# Patient Record
Sex: MALE | Race: WHITE | NOT HISPANIC OR LATINO | Employment: OTHER | URBAN - METROPOLITAN AREA
[De-identification: names, ages, dates, MRNs, and addresses within clinical notes are randomized per-mention and may not be internally consistent; named-entity substitution may affect disease eponyms.]

---

## 2017-11-15 ENCOUNTER — HOSPITAL ENCOUNTER (INPATIENT)
Facility: MEDICAL CENTER | Age: 77
LOS: 7 days | DRG: 166 | End: 2017-11-22
Attending: EMERGENCY MEDICINE | Admitting: HOSPITALIST
Payer: MEDICARE

## 2017-11-15 ENCOUNTER — APPOINTMENT (OUTPATIENT)
Dept: RADIOLOGY | Facility: MEDICAL CENTER | Age: 77
DRG: 166 | End: 2017-11-15
Attending: HOSPITALIST
Payer: MEDICARE

## 2017-11-15 ENCOUNTER — APPOINTMENT (OUTPATIENT)
Dept: RADIOLOGY | Facility: MEDICAL CENTER | Age: 77
DRG: 166 | End: 2017-11-15
Attending: EMERGENCY MEDICINE
Payer: MEDICARE

## 2017-11-15 ENCOUNTER — APPOINTMENT (OUTPATIENT)
Dept: RADIOLOGY | Facility: MEDICAL CENTER | Age: 77
DRG: 166 | End: 2017-11-15
Attending: INTERNAL MEDICINE
Payer: MEDICARE

## 2017-11-15 ENCOUNTER — RESOLUTE PROFESSIONAL BILLING HOSPITAL PROF FEE (OUTPATIENT)
Dept: HOSPITALIST | Facility: MEDICAL CENTER | Age: 77
End: 2017-11-15
Payer: COMMERCIAL

## 2017-11-15 DIAGNOSIS — J96.22 ACUTE ON CHRONIC RESPIRATORY FAILURE WITH HYPOXIA AND HYPERCAPNIA (HCC): ICD-10-CM

## 2017-11-15 DIAGNOSIS — L03.90 CHRONIC CELLULITIS: ICD-10-CM

## 2017-11-15 DIAGNOSIS — J96.21 ACUTE ON CHRONIC RESPIRATORY FAILURE WITH HYPOXIA AND HYPERCAPNIA (HCC): ICD-10-CM

## 2017-11-15 DIAGNOSIS — J96.22 ACUTE ON CHRONIC RESPIRATORY FAILURE WITH HYPERCAPNIA (HCC): ICD-10-CM

## 2017-11-15 DIAGNOSIS — I95.9 HYPOTENSION, UNSPECIFIED HYPOTENSION TYPE: ICD-10-CM

## 2017-11-15 DIAGNOSIS — J44.1 COPD EXACERBATION (HCC): ICD-10-CM

## 2017-11-15 LAB
ALBUMIN SERPL BCP-MCNC: 3.7 G/DL (ref 3.2–4.9)
ALBUMIN/GLOB SERPL: 1.1 G/DL
ALP SERPL-CCNC: 84 U/L (ref 30–99)
ALT SERPL-CCNC: 8 U/L (ref 2–50)
ANION GAP SERPL CALC-SCNC: 3 MMOL/L (ref 0–11.9)
APPEARANCE UR: CLEAR
AST SERPL-CCNC: 13 U/L (ref 12–45)
BACTERIA #/AREA URNS HPF: NEGATIVE /HPF
BASE EXCESS BLDA CALC-SCNC: -1 MMOL/L (ref -4–3)
BASE EXCESS BLDA CALC-SCNC: -3 MMOL/L (ref -4–3)
BASOPHILS # BLD AUTO: 0.3 % (ref 0–1.8)
BASOPHILS # BLD: 0.03 K/UL (ref 0–0.12)
BILIRUB SERPL-MCNC: 0.5 MG/DL (ref 0.1–1.5)
BILIRUB UR QL STRIP.AUTO: NEGATIVE
BODY TEMPERATURE: 35 CENTIGRADE
BODY TEMPERATURE: ABNORMAL DEGREES
BUN SERPL-MCNC: 16 MG/DL (ref 8–22)
CALCIUM SERPL-MCNC: 9.3 MG/DL (ref 8.5–10.5)
CHLORIDE SERPL-SCNC: 92 MMOL/L (ref 96–112)
CO2 BLDA-SCNC: 31 MMOL/L (ref 20–33)
CO2 SERPL-SCNC: 31 MMOL/L (ref 20–33)
COLOR UR: YELLOW
CREAT SERPL-MCNC: 1.82 MG/DL (ref 0.5–1.4)
CREAT UR-MCNC: 244.3 MG/DL
EOSINOPHIL # BLD AUTO: 0.26 K/UL (ref 0–0.51)
EOSINOPHIL NFR BLD: 2.9 % (ref 0–6.9)
EPI CELLS #/AREA URNS HPF: NEGATIVE /HPF
ERYTHROCYTE [DISTWIDTH] IN BLOOD BY AUTOMATED COUNT: 49.2 FL (ref 35.9–50)
GFR SERPL CREATININE-BSD FRML MDRD: 36 ML/MIN/1.73 M 2
GLOBULIN SER CALC-MCNC: 3.5 G/DL (ref 1.9–3.5)
GLUCOSE SERPL-MCNC: 128 MG/DL (ref 65–99)
GLUCOSE UR STRIP.AUTO-MCNC: NEGATIVE MG/DL
HCO3 BLDA-SCNC: 28 MMOL/L (ref 17–25)
HCO3 BLDA-SCNC: 28.7 MMOL/L (ref 17–25)
HCT VFR BLD AUTO: 39.2 % (ref 42–52)
HGB BLD-MCNC: 12.1 G/DL (ref 14–18)
HYALINE CASTS #/AREA URNS LPF: ABNORMAL /LPF
IMM GRANULOCYTES # BLD AUTO: 0.05 K/UL (ref 0–0.11)
IMM GRANULOCYTES NFR BLD AUTO: 0.6 % (ref 0–0.9)
INHALED O2 FLOW RATE: 4.5 L/MIN (ref 2–10)
KETONES UR STRIP.AUTO-MCNC: NEGATIVE MG/DL
LACTATE BLD-SCNC: 1 MMOL/L (ref 0.5–2)
LACTATE BLD-SCNC: 1.3 MMOL/L (ref 0.5–2)
LEUKOCYTE ESTERASE UR QL STRIP.AUTO: NEGATIVE
LYMPHOCYTES # BLD AUTO: 1.15 K/UL (ref 1–4.8)
LYMPHOCYTES NFR BLD: 12.8 % (ref 22–41)
MCH RBC QN AUTO: 28.8 PG (ref 27–33)
MCHC RBC AUTO-ENTMCNC: 30.9 G/DL (ref 33.7–35.3)
MCV RBC AUTO: 93.3 FL (ref 81.4–97.8)
MICRO URNS: ABNORMAL
MONOCYTES # BLD AUTO: 0.66 K/UL (ref 0–0.85)
MONOCYTES NFR BLD AUTO: 7.3 % (ref 0–13.4)
NEUTROPHILS # BLD AUTO: 6.85 K/UL (ref 1.82–7.42)
NEUTROPHILS NFR BLD: 76.1 % (ref 44–72)
NITRITE UR QL STRIP.AUTO: NEGATIVE
NRBC # BLD AUTO: 0 K/UL
NRBC BLD AUTO-RTO: 0 /100 WBC
O2/TOTAL GAS SETTING VFR VENT: 60 %
PCO2 BLDA: 78.8 MMHG (ref 26–37)
PCO2 BLDA: 79.7 MMHG (ref 26–37)
PCO2 TEMP ADJ BLDA: 73 MMHG (ref 26–37)
PCO2 TEMP ADJ BLDA: 80.9 MMHG (ref 26–37)
PH BLDA: 7.16 [PH] (ref 7.4–7.5)
PH BLDA: 7.17 [PH] (ref 7.4–7.5)
PH TEMP ADJ BLDA: 7.16 [PH] (ref 7.4–7.5)
PH TEMP ADJ BLDA: 7.19 [PH] (ref 7.4–7.5)
PH UR STRIP.AUTO: 5 [PH]
PLATELET # BLD AUTO: 235 K/UL (ref 164–446)
PMV BLD AUTO: 9.2 FL (ref 9–12.9)
PO2 BLDA: 145 MMHG (ref 64–87)
PO2 BLDA: 85.3 MMHG (ref 64–87)
PO2 TEMP ADJ BLDA: 148 MMHG (ref 64–87)
PO2 TEMP ADJ BLDA: 75 MMHG (ref 64–87)
POTASSIUM SERPL-SCNC: 5.2 MMOL/L (ref 3.6–5.5)
PROCALCITONIN SERPL-MCNC: 0.06 NG/ML
PROT SERPL-MCNC: 7.2 G/DL (ref 6–8.2)
PROT UR QL STRIP: NEGATIVE MG/DL
RBC # BLD AUTO: 4.2 M/UL (ref 4.7–6.1)
RBC # URNS HPF: ABNORMAL /HPF
RBC UR QL AUTO: ABNORMAL
SAO2 % BLDA: 94.1 % (ref 93–99)
SAO2 % BLDA: 98 % (ref 93–99)
SODIUM SERPL-SCNC: 126 MMOL/L (ref 135–145)
SODIUM SERPL-SCNC: 130 MMOL/L (ref 135–145)
SODIUM UR-SCNC: 23 MMOL/L
SP GR UR STRIP.AUTO: 1.02
SPECIMEN DRAWN FROM PATIENT: ABNORMAL
TRIGL SERPL-MCNC: 151 MG/DL (ref 0–149)
UROBILINOGEN UR STRIP.AUTO-MCNC: 0.2 MG/DL
WBC # BLD AUTO: 9 K/UL (ref 4.8–10.8)
WBC #/AREA URNS HPF: ABNORMAL /HPF

## 2017-11-15 PROCEDURE — 96375 TX/PRO/DX INJ NEW DRUG ADDON: CPT

## 2017-11-15 PROCEDURE — 96367 TX/PROPH/DG ADDL SEQ IV INF: CPT

## 2017-11-15 PROCEDURE — 770022 HCHG ROOM/CARE - ICU (200)

## 2017-11-15 PROCEDURE — 36556 INSERT NON-TUNNEL CV CATH: CPT

## 2017-11-15 PROCEDURE — 304561 HCHG STAT O2

## 2017-11-15 PROCEDURE — 0BH17EZ INSERTION OF ENDOTRACHEAL AIRWAY INTO TRACHEA, VIA NATURAL OR ARTIFICIAL OPENING: ICD-10-PCS | Performed by: EMERGENCY MEDICINE

## 2017-11-15 PROCEDURE — 93005 ELECTROCARDIOGRAM TRACING: CPT | Performed by: HOSPITALIST

## 2017-11-15 PROCEDURE — 82570 ASSAY OF URINE CREATININE: CPT

## 2017-11-15 PROCEDURE — 84300 ASSAY OF URINE SODIUM: CPT

## 2017-11-15 PROCEDURE — 96365 THER/PROPH/DIAG IV INF INIT: CPT

## 2017-11-15 PROCEDURE — 94002 VENT MGMT INPAT INIT DAY: CPT

## 2017-11-15 PROCEDURE — 700105 HCHG RX REV CODE 258: Performed by: EMERGENCY MEDICINE

## 2017-11-15 PROCEDURE — 304538 HCHG NG TUBE

## 2017-11-15 PROCEDURE — B543ZZA ULTRASONOGRAPHY OF RIGHT JUGULAR VEINS, GUIDANCE: ICD-10-PCS | Performed by: INTERNAL MEDICINE

## 2017-11-15 PROCEDURE — 303105 HCHG CATHETER EXTRA

## 2017-11-15 PROCEDURE — 99291 CRITICAL CARE FIRST HOUR: CPT | Performed by: HOSPITALIST

## 2017-11-15 PROCEDURE — 87070 CULTURE OTHR SPECIMN AEROBIC: CPT

## 2017-11-15 PROCEDURE — 87205 SMEAR GRAM STAIN: CPT

## 2017-11-15 PROCEDURE — 96366 THER/PROPH/DIAG IV INF ADDON: CPT

## 2017-11-15 PROCEDURE — 31500 INSERT EMERGENCY AIRWAY: CPT

## 2017-11-15 PROCEDURE — 302214 INTUBATION BOX: Performed by: EMERGENCY MEDICINE

## 2017-11-15 PROCEDURE — 84295 ASSAY OF SERUM SODIUM: CPT

## 2017-11-15 PROCEDURE — 700101 HCHG RX REV CODE 250

## 2017-11-15 PROCEDURE — 96368 THER/DIAG CONCURRENT INF: CPT

## 2017-11-15 PROCEDURE — 83605 ASSAY OF LACTIC ACID: CPT | Mod: 91

## 2017-11-15 PROCEDURE — 51702 INSERT TEMP BLADDER CATH: CPT

## 2017-11-15 PROCEDURE — 85025 COMPLETE CBC W/AUTO DIFF WBC: CPT

## 2017-11-15 PROCEDURE — 84145 PROCALCITONIN (PCT): CPT

## 2017-11-15 PROCEDURE — 36415 COLL VENOUS BLD VENIPUNCTURE: CPT

## 2017-11-15 PROCEDURE — 71010 DX-CHEST-PORTABLE (1 VIEW): CPT

## 2017-11-15 PROCEDURE — 5A1945Z RESPIRATORY VENTILATION, 24-96 CONSECUTIVE HOURS: ICD-10-PCS | Performed by: EMERGENCY MEDICINE

## 2017-11-15 PROCEDURE — 02HV33Z INSERTION OF INFUSION DEVICE INTO SUPERIOR VENA CAVA, PERCUTANEOUS APPROACH: ICD-10-PCS | Performed by: INTERNAL MEDICINE

## 2017-11-15 PROCEDURE — 80053 COMPREHEN METABOLIC PANEL: CPT

## 2017-11-15 PROCEDURE — 700111 HCHG RX REV CODE 636 W/ 250 OVERRIDE (IP): Performed by: EMERGENCY MEDICINE

## 2017-11-15 PROCEDURE — 94640 AIRWAY INHALATION TREATMENT: CPT

## 2017-11-15 PROCEDURE — 87040 BLOOD CULTURE FOR BACTERIA: CPT | Mod: 91

## 2017-11-15 PROCEDURE — 700101 HCHG RX REV CODE 250: Performed by: EMERGENCY MEDICINE

## 2017-11-15 PROCEDURE — C1751 CATH, INF, PER/CENT/MIDLINE: HCPCS

## 2017-11-15 PROCEDURE — 81001 URINALYSIS AUTO W/SCOPE: CPT

## 2017-11-15 PROCEDURE — 700105 HCHG RX REV CODE 258

## 2017-11-15 PROCEDURE — 84478 ASSAY OF TRIGLYCERIDES: CPT

## 2017-11-15 PROCEDURE — 87086 URINE CULTURE/COLONY COUNT: CPT

## 2017-11-15 PROCEDURE — 99291 CRITICAL CARE FIRST HOUR: CPT

## 2017-11-15 PROCEDURE — 82803 BLOOD GASES ANY COMBINATION: CPT | Mod: 91

## 2017-11-15 RX ORDER — SODIUM CHLORIDE 9 MG/ML
INJECTION, SOLUTION INTRAVENOUS
Status: COMPLETED
Start: 2017-11-15 | End: 2017-11-15

## 2017-11-15 RX ORDER — GABAPENTIN 300 MG/1
300 CAPSULE ORAL
COMMUNITY
End: 2017-12-30

## 2017-11-15 RX ORDER — ATORVASTATIN CALCIUM 20 MG/1
20 TABLET, FILM COATED ORAL DAILY
Status: DISCONTINUED | OUTPATIENT
Start: 2017-11-16 | End: 2017-11-22 | Stop reason: HOSPADM

## 2017-11-15 RX ORDER — GABAPENTIN 100 MG/1
100 CAPSULE ORAL NIGHTLY PRN
COMMUNITY
End: 2017-11-15

## 2017-11-15 RX ORDER — ONDANSETRON 4 MG/1
4 TABLET, ORALLY DISINTEGRATING ORAL EVERY 4 HOURS PRN
Status: DISCONTINUED | OUTPATIENT
Start: 2017-11-15 | End: 2017-11-22 | Stop reason: HOSPADM

## 2017-11-15 RX ORDER — CARVEDILOL 25 MG/1
25 TABLET ORAL
COMMUNITY
End: 2017-11-15

## 2017-11-15 RX ORDER — NOREPINEPHRINE BITARTRATE 1 MG/ML
INJECTION, SOLUTION INTRAVENOUS
Status: DISCONTINUED
Start: 2017-11-15 | End: 2017-11-16

## 2017-11-15 RX ORDER — MORPHINE SULFATE 15 MG/1
15 TABLET ORAL EVERY 8 HOURS PRN
Status: ON HOLD | COMMUNITY
End: 2017-12-30

## 2017-11-15 RX ORDER — IPRATROPIUM BROMIDE AND ALBUTEROL SULFATE 2.5; .5 MG/3ML; MG/3ML
SOLUTION RESPIRATORY (INHALATION)
Status: COMPLETED
Start: 2017-11-15 | End: 2017-11-15

## 2017-11-15 RX ORDER — DEXTROSE MONOHYDRATE 25 G/50ML
25 INJECTION, SOLUTION INTRAVENOUS
Status: DISCONTINUED | OUTPATIENT
Start: 2017-11-15 | End: 2017-11-17

## 2017-11-15 RX ORDER — GABAPENTIN 300 MG/1
300 CAPSULE ORAL
Status: DISCONTINUED | OUTPATIENT
Start: 2017-11-15 | End: 2017-11-22 | Stop reason: HOSPADM

## 2017-11-15 RX ORDER — BISACODYL 10 MG
10 SUPPOSITORY, RECTAL RECTAL
Status: DISCONTINUED | OUTPATIENT
Start: 2017-11-15 | End: 2017-11-15

## 2017-11-15 RX ORDER — ATORVASTATIN CALCIUM 20 MG/1
20 TABLET, FILM COATED ORAL DAILY
COMMUNITY
End: 2017-12-30

## 2017-11-15 RX ORDER — CARVEDILOL 12.5 MG/1
12.5 TABLET ORAL 2 TIMES DAILY WITH MEALS
Status: DISCONTINUED | OUTPATIENT
Start: 2017-11-16 | End: 2017-11-15

## 2017-11-15 RX ORDER — FUROSEMIDE 20 MG/1
20 TABLET ORAL DAILY
COMMUNITY
End: 2017-12-30

## 2017-11-15 RX ORDER — OXYCODONE HYDROCHLORIDE 5 MG/1
5 TABLET ORAL
Status: DISCONTINUED | OUTPATIENT
Start: 2017-11-15 | End: 2017-11-22 | Stop reason: HOSPADM

## 2017-11-15 RX ORDER — POLYETHYLENE GLYCOL 3350 17 G/17G
1 POWDER, FOR SOLUTION ORAL
Status: DISCONTINUED | OUTPATIENT
Start: 2017-11-15 | End: 2017-11-15

## 2017-11-15 RX ORDER — IPRATROPIUM BROMIDE AND ALBUTEROL SULFATE 2.5; .5 MG/3ML; MG/3ML
3 SOLUTION RESPIRATORY (INHALATION)
Status: DISCONTINUED | OUTPATIENT
Start: 2017-11-15 | End: 2017-11-20

## 2017-11-15 RX ORDER — CHLORHEXIDINE GLUCONATE ORAL RINSE 1.2 MG/ML
15 SOLUTION DENTAL 2 TIMES DAILY
Status: DISCONTINUED | OUTPATIENT
Start: 2017-11-15 | End: 2017-11-19

## 2017-11-15 RX ORDER — CEFTRIAXONE 2 G/1
2 INJECTION, POWDER, FOR SOLUTION INTRAMUSCULAR; INTRAVENOUS ONCE
Status: COMPLETED | OUTPATIENT
Start: 2017-11-15 | End: 2017-11-15

## 2017-11-15 RX ORDER — SODIUM CHLORIDE, SODIUM LACTATE, POTASSIUM CHLORIDE, CALCIUM CHLORIDE 600; 310; 30; 20 MG/100ML; MG/100ML; MG/100ML; MG/100ML
500 INJECTION, SOLUTION INTRAVENOUS ONCE
Status: COMPLETED | OUTPATIENT
Start: 2017-11-15 | End: 2017-11-15

## 2017-11-15 RX ORDER — METHYLPREDNISOLONE SODIUM SUCCINATE 40 MG/ML
40 INJECTION, POWDER, LYOPHILIZED, FOR SOLUTION INTRAMUSCULAR; INTRAVENOUS EVERY 6 HOURS
Status: DISCONTINUED | OUTPATIENT
Start: 2017-11-15 | End: 2017-11-15

## 2017-11-15 RX ORDER — BISACODYL 10 MG
10 SUPPOSITORY, RECTAL RECTAL
Status: DISCONTINUED | OUTPATIENT
Start: 2017-11-15 | End: 2017-11-22 | Stop reason: HOSPADM

## 2017-11-15 RX ORDER — ONDANSETRON 2 MG/ML
4 INJECTION INTRAMUSCULAR; INTRAVENOUS EVERY 4 HOURS PRN
Status: DISCONTINUED | OUTPATIENT
Start: 2017-11-15 | End: 2017-11-22 | Stop reason: HOSPADM

## 2017-11-15 RX ORDER — SODIUM CHLORIDE 9 MG/ML
30 INJECTION, SOLUTION INTRAVENOUS
Status: DISCONTINUED | OUTPATIENT
Start: 2017-11-15 | End: 2017-11-22 | Stop reason: HOSPADM

## 2017-11-15 RX ORDER — ACETAMINOPHEN 325 MG/1
650 TABLET ORAL EVERY 6 HOURS PRN
Status: DISCONTINUED | OUTPATIENT
Start: 2017-11-15 | End: 2017-11-22 | Stop reason: HOSPADM

## 2017-11-15 RX ORDER — AMOXICILLIN 250 MG
2 CAPSULE ORAL 2 TIMES DAILY
Status: DISCONTINUED | OUTPATIENT
Start: 2017-11-16 | End: 2017-11-22 | Stop reason: HOSPADM

## 2017-11-15 RX ORDER — SUCCINYLCHOLINE CHLORIDE 20 MG/ML
120 INJECTION INTRAMUSCULAR; INTRAVENOUS ONCE
Status: COMPLETED | OUTPATIENT
Start: 2017-11-15 | End: 2017-11-15

## 2017-11-15 RX ORDER — SODIUM CHLORIDE 9 MG/ML
500 INJECTION, SOLUTION INTRAVENOUS
Status: DISCONTINUED | OUTPATIENT
Start: 2017-11-15 | End: 2017-11-22 | Stop reason: HOSPADM

## 2017-11-15 RX ORDER — CARVEDILOL 12.5 MG/1
12.5 TABLET ORAL 2 TIMES DAILY WITH MEALS
Status: ON HOLD | COMMUNITY
End: 2017-11-22

## 2017-11-15 RX ORDER — MORPHINE SULFATE 15 MG/1
15 TABLET, FILM COATED, EXTENDED RELEASE ORAL 3 TIMES DAILY
COMMUNITY
End: 2017-11-15

## 2017-11-15 RX ORDER — METHYLPREDNISOLONE SODIUM SUCCINATE 125 MG/2ML
62.5 INJECTION, POWDER, LYOPHILIZED, FOR SOLUTION INTRAMUSCULAR; INTRAVENOUS EVERY 6 HOURS
Status: DISCONTINUED | OUTPATIENT
Start: 2017-11-16 | End: 2017-11-18

## 2017-11-15 RX ORDER — LISINOPRIL 20 MG/1
20 TABLET ORAL DAILY
Status: DISCONTINUED | OUTPATIENT
Start: 2017-11-16 | End: 2017-11-15

## 2017-11-15 RX ORDER — FAMOTIDINE 20 MG/1
20 TABLET, FILM COATED ORAL DAILY
Status: DISCONTINUED | OUTPATIENT
Start: 2017-11-16 | End: 2017-11-20

## 2017-11-15 RX ORDER — CLINDAMYCIN HYDROCHLORIDE 150 MG/1
150 CAPSULE ORAL 3 TIMES DAILY
Status: ON HOLD | COMMUNITY
Start: 2017-10-17 | End: 2017-11-22

## 2017-11-15 RX ORDER — AMOXICILLIN 250 MG
2 CAPSULE ORAL 2 TIMES DAILY
Status: DISCONTINUED | OUTPATIENT
Start: 2017-11-15 | End: 2017-11-15

## 2017-11-15 RX ORDER — LISINOPRIL 20 MG/1
20 TABLET ORAL
COMMUNITY
End: 2017-12-30

## 2017-11-15 RX ORDER — SODIUM CHLORIDE 9 MG/ML
INJECTION, SOLUTION INTRAVENOUS CONTINUOUS
Status: DISCONTINUED | OUTPATIENT
Start: 2017-11-15 | End: 2017-11-18

## 2017-11-15 RX ORDER — POLYETHYLENE GLYCOL 3350 17 G/17G
1 POWDER, FOR SOLUTION ORAL
Status: DISCONTINUED | OUTPATIENT
Start: 2017-11-15 | End: 2017-11-22 | Stop reason: HOSPADM

## 2017-11-15 RX ORDER — OXYCODONE HYDROCHLORIDE 5 MG/1
2.5 TABLET ORAL
Status: DISCONTINUED | OUTPATIENT
Start: 2017-11-15 | End: 2017-11-22 | Stop reason: HOSPADM

## 2017-11-15 RX ORDER — LIDOCAINE HYDROCHLORIDE 10 MG/ML
1-2 INJECTION, SOLUTION INFILTRATION; PERINEURAL
Status: DISCONTINUED | OUTPATIENT
Start: 2017-11-15 | End: 2017-11-20

## 2017-11-15 RX ADMIN — DOXYCYCLINE 100 MG: 100 INJECTION, POWDER, LYOPHILIZED, FOR SOLUTION INTRAVENOUS at 18:10

## 2017-11-15 RX ADMIN — CEFTRIAXONE SODIUM 2 G: 2 INJECTION, POWDER, FOR SOLUTION INTRAMUSCULAR; INTRAVENOUS at 17:45

## 2017-11-15 RX ADMIN — SODIUM CHLORIDE 100 ML: 9 INJECTION, SOLUTION INTRAVENOUS at 17:45

## 2017-11-15 RX ADMIN — PROPOFOL 80 MG: 10 INJECTION, EMULSION INTRAVENOUS at 19:17

## 2017-11-15 RX ADMIN — SODIUM CHLORIDE, POTASSIUM CHLORIDE, SODIUM LACTATE AND CALCIUM CHLORIDE 500 ML: 600; 310; 30; 20 INJECTION, SOLUTION INTRAVENOUS at 17:29

## 2017-11-15 RX ADMIN — IPRATROPIUM BROMIDE AND ALBUTEROL SULFATE 3 ML: .5; 3 SOLUTION RESPIRATORY (INHALATION) at 21:41

## 2017-11-15 RX ADMIN — PROPOFOL 40 MCG/KG/MIN: 10 INJECTION, EMULSION INTRAVENOUS at 19:24

## 2017-11-15 RX ADMIN — NOREPINEPHRINE BITARTRATE 5 MCG/MIN: 1 INJECTION INTRAVENOUS at 20:30

## 2017-11-15 RX ADMIN — SUCCINYLCHOLINE CHLORIDE 120 MG: 20 INJECTION, SOLUTION INTRAMUSCULAR; INTRAVENOUS at 19:25

## 2017-11-15 ASSESSMENT — CHA2DS2 SCORE
CHF OR LEFT VENTRICULAR DYSFUNCTION: YES
AGE 65 TO 74: NO
DIABETES: YES
HYPERTENSION: NO
PRIOR STROKE OR TIA OR THROMBOEMBOLISM: NO
SEX: MALE
CHA2DS2 VASC SCORE: 4
AGE 75 OR GREATER: YES
VASCULAR DISEASE: NO

## 2017-11-15 ASSESSMENT — LIFESTYLE VARIABLES: DO YOU DRINK ALCOHOL: NO

## 2017-11-15 ASSESSMENT — PAIN SCALES - GENERAL
PAINLEVEL_OUTOF10: 0

## 2017-11-15 NOTE — ED NOTES
"BIB [facility] to [facility].  [facility].  Pt reports feeling fine after treatments and awoke feeling fine this a.m.  Pts daughter states she came home at 1230 to find pt altered \"and twitching\" sitting up in his chair.  Medics arrived to find pts O2 sat 80% on his 4L's of O2, placed on a mask pta. Pt arrives A&Ox3, arrives c/o feeling \"tired\".  Pt in gown, on monitor, chart up for ERP.  Sepsis score of 4, protocol initiated. Blood drawn & sent.   "

## 2017-11-15 NOTE — ED PROVIDER NOTES
ED Provider Note    HPI: Patient is a 77-year-old male who presented to the emergency department by ambulance transfer November 15, 2017 at 3:31 PM with a chief complaint of altered level of consciousness.    Patient is being followed at the [facility] for chronic lower extremity cellulitis. Patient had treatment yesterday and woke up today feeling okay. The patient's daughter states she came home at 12:30 PM to find the patient altered with twitching movements in and up in his chair. Paramedics arrived and found the patient had no desaturation of 80% on 4 L. With a facemask on the patient on arrival the patient is complaining of feeling tired but otherwise seems improved. His daughter accompanies him and also confirms that he is confused. The patient adamantly denied any chest pain or abdominal pain. He's had no nausea or vomiting. No fever no chills no cough. No other somatic complaints    Review of Systems: Positive for altered level of consciousness hypoxia chronic lower extremity cellulitis negative fever chills cough chest pain abdominal pain. Review of systems reviewed the patient, all other systems negative    Past medical/surgical history: Hernia repair hypertension cellulitis CHF COPD pulmonary embolism    Medications: Chronic O2, gabapentin, Coreg, atorvastatin, lisinopril, xeralto, Lasix, morphine, Tylenol    Allergies: None    Social History: Previous history of smoking no alcohol use      Physical exam: Constitutional: Somewhat obese male awake alert  Vital signs:  Temperature 98.1 pulse 104 respirations 20 blood pressure 117/67 pulse oximetry 80% on 4 L 94% on 5 L  EYES: PERRL, EOMI, Conjunctivae and sclera normal, eyelids normal bilaterally.  Neck: Trachea midline. No cervical masses seen or palpated. Normal range of motion, supple. No meningeal signs elicited.  Cardiac: Regular rate and rhythm. S1-S2 present. No S3 or S4 present. No murmurs, rubs, or gallops heard. No edema or varicosities were  seen.   Lungs: Coarse breath sounds diffusely. No wheezes, rales, or rhonchi heard. Patient's chest wall moved symmetrically with each respiratory effort. Patient was not making use of accessory muscles of respiration in breathing.  Abdomen: Markedly distended. Soft nontender to palpation. No rebound or guarding elicited. No organomegaly identified. No pulsatile abdominal masses identified.   Musculoskeletal:  no  pain with palpitation or movement of muscle, bone or joint , no obvious musculoskeletal deformities identified.  Neurologic: alert and awake answers questions appropriately. Moves all four extremities independently, no gross focal abnormalities identified. Normal strength and motor.  Skin: Bilateral lower extremity erythematous changes consistent with chronic cellulitis below both knees. (Daughter states these are actually improving over Previous) no other rash or lesions seen. No other palpable dermatologic lesion identified.  Psychiatric: not anxious, delusional, or hallucinating.    Medical decision making:  Laboratory studies were obtained (please see lab sheet for all results) significant findings included normal white count of 9.0. There is no preponderance immature granulocytes making systemic infection unlikely. Moderate hyponatremia is present with sodium 126 the patient's creatinine is elevated 1.82. Lactic acid normal.    Chest x-ray obtained; bilateral lung base interstitial opacities are present pneumonitis versus atelectasis    Given the above blood gas was obtained; pH 7.160-85 CO2 79.7 consistent with respiratory failure    Respiratory service was consulted. The patient was unable to tolerate BiPAP due to his somnolence. Abdomen discussion with the family we elected to intubate the patient. Pulmonary service was consulted and Dr. Day was kind enough to see the patient in the department.    Patient sedated with appropriate dose of propofol followed by use of succinylcholine.  Patient intubated on 1st attempt with an 8.0 ET tube. The patient acutely desaturated as soon as intubation was undertaken with a pulse oximetry of approximately 75. Total duration of hypoxia perhaps 30 seconds. Once patient intubated oxygenation status rapidly recovered to 100% however the patient was noted to be somewhat hypotensive given a fluid bolus which did not improve his blood pressure.    Pulmonary specialist placed a central line for hypotension and the patient started on Levophed. Please see his note.    Patient will be admitted to the ICU. Given the above I do consider this patient critically ill.    Please note that I spent 35 minutes in the direct care of this critically ill patient exclusive of procedure time. This times was spent in evaluating the patient, reviewing test results, discussion with consultants, and preparing medical record    Impression 1) respiratory failure  2) chronic lower external cellulitis  3) refractory hypotension  #4) COPD exacerbation, acute  #5) critical care greater than 30 minutes

## 2017-11-16 ENCOUNTER — APPOINTMENT (OUTPATIENT)
Dept: RADIOLOGY | Facility: MEDICAL CENTER | Age: 77
DRG: 166 | End: 2017-11-16
Attending: INTERNAL MEDICINE
Payer: MEDICARE

## 2017-11-16 PROBLEM — L97.909 VENOUS STASIS ULCER (HCC): Status: ACTIVE | Noted: 2017-11-16

## 2017-11-16 PROBLEM — I27.82 CHRONIC PULMONARY EMBOLISM (HCC): Status: ACTIVE | Noted: 2017-11-16

## 2017-11-16 PROBLEM — J18.9 PNEUMONIA: Status: ACTIVE | Noted: 2017-11-16

## 2017-11-16 PROBLEM — J96.22 ACUTE ON CHRONIC RESPIRATORY FAILURE WITH HYPOXIA AND HYPERCAPNIA (HCC): Status: ACTIVE | Noted: 2017-11-16

## 2017-11-16 PROBLEM — I83.009 VENOUS STASIS ULCER (HCC): Status: ACTIVE | Noted: 2017-11-16

## 2017-11-16 PROBLEM — E87.1 HYPONATREMIA: Status: ACTIVE | Noted: 2017-11-16

## 2017-11-16 PROBLEM — J96.21 ACUTE ON CHRONIC RESPIRATORY FAILURE WITH HYPOXIA AND HYPERCAPNIA (HCC): Status: ACTIVE | Noted: 2017-11-16

## 2017-11-16 PROBLEM — N17.9 AKI (ACUTE KIDNEY INJURY) (HCC): Status: ACTIVE | Noted: 2017-11-16

## 2017-11-16 PROBLEM — R57.9 SHOCK (HCC): Status: ACTIVE | Noted: 2017-11-16

## 2017-11-16 LAB
ANION GAP SERPL CALC-SCNC: 5 MMOL/L (ref 0–11.9)
BASE EXCESS BLDA CALC-SCNC: 1 MMOL/L (ref -4–3)
BASOPHILS # BLD AUTO: 0.3 % (ref 0–1.8)
BASOPHILS # BLD: 0.03 K/UL (ref 0–0.12)
BODY TEMPERATURE: ABNORMAL DEGREES
BUN SERPL-MCNC: 18 MG/DL (ref 8–22)
CALCIUM SERPL-MCNC: 8.2 MG/DL (ref 8.5–10.5)
CHLORIDE SERPL-SCNC: 97 MMOL/L (ref 96–112)
CO2 BLDA-SCNC: 29 MMOL/L (ref 20–33)
CO2 SERPL-SCNC: 26 MMOL/L (ref 20–33)
CREAT SERPL-MCNC: 1.7 MG/DL (ref 0.5–1.4)
EOSINOPHIL # BLD AUTO: 0.24 K/UL (ref 0–0.51)
EOSINOPHIL NFR BLD: 2.7 % (ref 0–6.9)
ERYTHROCYTE [DISTWIDTH] IN BLOOD BY AUTOMATED COUNT: 48.7 FL (ref 35.9–50)
GFR SERPL CREATININE-BSD FRML MDRD: 39 ML/MIN/1.73 M 2
GLUCOSE BLD-MCNC: 125 MG/DL (ref 65–99)
GLUCOSE BLD-MCNC: 131 MG/DL (ref 65–99)
GLUCOSE BLD-MCNC: 138 MG/DL (ref 65–99)
GLUCOSE BLD-MCNC: 141 MG/DL (ref 65–99)
GLUCOSE BLD-MCNC: 142 MG/DL (ref 65–99)
GLUCOSE SERPL-MCNC: 136 MG/DL (ref 65–99)
GRAM STN SPEC: ABNORMAL
GRAM STN SPEC: NORMAL
HCO3 BLDA-SCNC: 27.3 MMOL/L (ref 17–25)
HCT VFR BLD AUTO: 33.9 % (ref 42–52)
HGB BLD-MCNC: 10.4 G/DL (ref 14–18)
IMM GRANULOCYTES # BLD AUTO: 0.04 K/UL (ref 0–0.11)
IMM GRANULOCYTES NFR BLD AUTO: 0.4 % (ref 0–0.9)
LACTATE BLD-SCNC: 1.6 MMOL/L (ref 0.5–2)
LV EJECT FRACT  99904: 60
LV EJECT FRACT MOD 2C 99903: 56.3
LV EJECT FRACT MOD 4C 99902: 61.19
LV EJECT FRACT MOD BP 99901: 60.21
LYMPHOCYTES # BLD AUTO: 1.56 K/UL (ref 1–4.8)
LYMPHOCYTES NFR BLD: 17.4 % (ref 22–41)
MAGNESIUM SERPL-MCNC: 1.8 MG/DL (ref 1.5–2.5)
MCH RBC QN AUTO: 28.3 PG (ref 27–33)
MCHC RBC AUTO-ENTMCNC: 30.7 G/DL (ref 33.7–35.3)
MCV RBC AUTO: 92.4 FL (ref 81.4–97.8)
MONOCYTES # BLD AUTO: 0.74 K/UL (ref 0–0.85)
MONOCYTES NFR BLD AUTO: 8.2 % (ref 0–13.4)
NEUTROPHILS # BLD AUTO: 6.37 K/UL (ref 1.82–7.42)
NEUTROPHILS NFR BLD: 71 % (ref 44–72)
NRBC # BLD AUTO: 0 K/UL
NRBC BLD AUTO-RTO: 0 /100 WBC
O2/TOTAL GAS SETTING VFR VENT: 100 %
PCO2 BLDA: 48.2 MMHG (ref 26–37)
PCO2 TEMP ADJ BLDA: 45.9 MMHG (ref 26–37)
PH BLDA: 7.36 [PH] (ref 7.4–7.5)
PH TEMP ADJ BLDA: 7.38 [PH] (ref 7.4–7.5)
PHOSPHATE SERPL-MCNC: 3.4 MG/DL (ref 2.5–4.5)
PLATELET # BLD AUTO: 216 K/UL (ref 164–446)
PMV BLD AUTO: 9.2 FL (ref 9–12.9)
PO2 BLDA: 200 MMHG (ref 64–87)
PO2 TEMP ADJ BLDA: 195 MMHG (ref 64–87)
POTASSIUM SERPL-SCNC: 4.5 MMOL/L (ref 3.6–5.5)
RBC # BLD AUTO: 3.67 M/UL (ref 4.7–6.1)
RHODAMINE-AURAMINE STN SPEC: NORMAL
SAO2 % BLDA: 100 % (ref 93–99)
SIGNIFICANT IND 70042: ABNORMAL
SIGNIFICANT IND 70042: NORMAL
SIGNIFICANT IND 70042: NORMAL
SITE SITE: ABNORMAL
SITE SITE: NORMAL
SITE SITE: NORMAL
SODIUM SERPL-SCNC: 128 MMOL/L (ref 135–145)
SODIUM SERPL-SCNC: 131 MMOL/L (ref 135–145)
SODIUM SERPL-SCNC: 132 MMOL/L (ref 135–145)
SODIUM SERPL-SCNC: 132 MMOL/L (ref 135–145)
SOURCE SOURCE: ABNORMAL
SOURCE SOURCE: NORMAL
SOURCE SOURCE: NORMAL
SPECIMEN DRAWN FROM PATIENT: ABNORMAL
WBC # BLD AUTO: 9 K/UL (ref 4.8–10.8)

## 2017-11-16 PROCEDURE — 84100 ASSAY OF PHOSPHORUS: CPT

## 2017-11-16 PROCEDURE — 94003 VENT MGMT INPAT SUBQ DAY: CPT

## 2017-11-16 PROCEDURE — 700102 HCHG RX REV CODE 250 W/ 637 OVERRIDE(OP): Performed by: INTERNAL MEDICINE

## 2017-11-16 PROCEDURE — 83735 ASSAY OF MAGNESIUM: CPT

## 2017-11-16 PROCEDURE — 700102 HCHG RX REV CODE 250 W/ 637 OVERRIDE(OP): Performed by: HOSPITALIST

## 2017-11-16 PROCEDURE — 87206 SMEAR FLUORESCENT/ACID STAI: CPT

## 2017-11-16 PROCEDURE — 700105 HCHG RX REV CODE 258: Performed by: HOSPITALIST

## 2017-11-16 PROCEDURE — 80048 BASIC METABOLIC PNL TOTAL CA: CPT

## 2017-11-16 PROCEDURE — 84295 ASSAY OF SERUM SODIUM: CPT

## 2017-11-16 PROCEDURE — 85025 COMPLETE CBC W/AUTO DIFF WBC: CPT

## 2017-11-16 PROCEDURE — 83605 ASSAY OF LACTIC ACID: CPT

## 2017-11-16 PROCEDURE — 87205 SMEAR GRAM STAIN: CPT

## 2017-11-16 PROCEDURE — 700111 HCHG RX REV CODE 636 W/ 250 OVERRIDE (IP): Performed by: INTERNAL MEDICINE

## 2017-11-16 PROCEDURE — 700111 HCHG RX REV CODE 636 W/ 250 OVERRIDE (IP)

## 2017-11-16 PROCEDURE — 0B9J8ZX DRAINAGE OF LEFT LOWER LUNG LOBE, VIA NATURAL OR ARTIFICIAL OPENING ENDOSCOPIC, DIAGNOSTIC: ICD-10-PCS | Performed by: INTERNAL MEDICINE

## 2017-11-16 PROCEDURE — 87102 FUNGUS ISOLATION CULTURE: CPT

## 2017-11-16 PROCEDURE — 700105 HCHG RX REV CODE 258: Performed by: INTERNAL MEDICINE

## 2017-11-16 PROCEDURE — 700111 HCHG RX REV CODE 636 W/ 250 OVERRIDE (IP): Performed by: HOSPITALIST

## 2017-11-16 PROCEDURE — 99233 SBSQ HOSP IP/OBS HIGH 50: CPT | Performed by: HOSPITALIST

## 2017-11-16 PROCEDURE — A9270 NON-COVERED ITEM OR SERVICE: HCPCS | Performed by: HOSPITALIST

## 2017-11-16 PROCEDURE — 93306 TTE W/DOPPLER COMPLETE: CPT | Mod: 26 | Performed by: INTERNAL MEDICINE

## 2017-11-16 PROCEDURE — 700101 HCHG RX REV CODE 250: Performed by: INTERNAL MEDICINE

## 2017-11-16 PROCEDURE — 94640 AIRWAY INHALATION TREATMENT: CPT

## 2017-11-16 PROCEDURE — 82962 GLUCOSE BLOOD TEST: CPT

## 2017-11-16 PROCEDURE — 302978 HCHG BRONCHOSCOPY-DIAGNOSTIC

## 2017-11-16 PROCEDURE — 88112 CYTOPATH CELL ENHANCE TECH: CPT

## 2017-11-16 PROCEDURE — 700111 HCHG RX REV CODE 636 W/ 250 OVERRIDE (IP): Performed by: EMERGENCY MEDICINE

## 2017-11-16 PROCEDURE — 87015 SPECIMEN INFECT AGNT CONCNTJ: CPT

## 2017-11-16 PROCEDURE — 770022 HCHG ROOM/CARE - ICU (200)

## 2017-11-16 PROCEDURE — 82803 BLOOD GASES ANY COMBINATION: CPT

## 2017-11-16 PROCEDURE — 88305 TISSUE EXAM BY PATHOLOGIST: CPT

## 2017-11-16 PROCEDURE — 87116 MYCOBACTERIA CULTURE: CPT

## 2017-11-16 PROCEDURE — A9270 NON-COVERED ITEM OR SERVICE: HCPCS | Performed by: INTERNAL MEDICINE

## 2017-11-16 PROCEDURE — 87070 CULTURE OTHR SPECIMN AEROBIC: CPT

## 2017-11-16 PROCEDURE — 71010 DX-CHEST-PORTABLE (1 VIEW): CPT

## 2017-11-16 PROCEDURE — 87106 FUNGI IDENTIFICATION YEAST: CPT

## 2017-11-16 PROCEDURE — 36600 WITHDRAWAL OF ARTERIAL BLOOD: CPT

## 2017-11-16 PROCEDURE — 93306 TTE W/DOPPLER COMPLETE: CPT

## 2017-11-16 RX ORDER — MAGNESIUM SULFATE HEPTAHYDRATE 40 MG/ML
2 INJECTION, SOLUTION INTRAVENOUS ONCE
Status: COMPLETED | OUTPATIENT
Start: 2017-11-16 | End: 2017-11-16

## 2017-11-16 RX ORDER — SODIUM CHLORIDE 9 MG/ML
INJECTION, SOLUTION INTRAVENOUS
Status: DISCONTINUED
Start: 2017-11-16 | End: 2017-11-16

## 2017-11-16 RX ADMIN — PROPOFOL 50 MCG/KG/MIN: 10 INJECTION, EMULSION INTRAVENOUS at 12:47

## 2017-11-16 RX ADMIN — PROPOFOL 50 MCG/KG/MIN: 10 INJECTION, EMULSION INTRAVENOUS at 10:01

## 2017-11-16 RX ADMIN — IPRATROPIUM BROMIDE AND ALBUTEROL SULFATE 3 ML: .5; 3 SOLUTION RESPIRATORY (INHALATION) at 14:39

## 2017-11-16 RX ADMIN — PROPOFOL 50 MCG/KG/MIN: 10 INJECTION, EMULSION INTRAVENOUS at 15:58

## 2017-11-16 RX ADMIN — CHLORHEXIDINE GLUCONATE 15 ML: 1.2 RINSE ORAL at 01:54

## 2017-11-16 RX ADMIN — ATORVASTATIN CALCIUM 20 MG: 20 TABLET, FILM COATED ORAL at 10:01

## 2017-11-16 RX ADMIN — PROPOFOL 50 MCG/KG/MIN: 10 INJECTION, EMULSION INTRAVENOUS at 07:15

## 2017-11-16 RX ADMIN — PROPOFOL 25 MCG/KG/MIN: 10 INJECTION, EMULSION INTRAVENOUS at 00:18

## 2017-11-16 RX ADMIN — PROPOFOL 45 MCG/KG/MIN: 10 INJECTION, EMULSION INTRAVENOUS at 03:19

## 2017-11-16 RX ADMIN — IPRATROPIUM BROMIDE AND ALBUTEROL SULFATE 3 ML: .5; 3 SOLUTION RESPIRATORY (INHALATION) at 03:51

## 2017-11-16 RX ADMIN — IPRATROPIUM BROMIDE AND ALBUTEROL SULFATE 3 ML: .5; 3 SOLUTION RESPIRATORY (INHALATION) at 10:25

## 2017-11-16 RX ADMIN — SODIUM CHLORIDE: 9 INJECTION, SOLUTION INTRAVENOUS at 01:53

## 2017-11-16 RX ADMIN — CHLORHEXIDINE GLUCONATE 15 ML: 1.2 RINSE ORAL at 10:01

## 2017-11-16 RX ADMIN — PROPOFOL 60 MCG/KG/MIN: 10 INJECTION, EMULSION INTRAVENOUS at 18:17

## 2017-11-16 RX ADMIN — METHYLPREDNISOLONE SODIUM SUCCINATE 62.5 MG: 125 INJECTION, POWDER, FOR SOLUTION INTRAMUSCULAR; INTRAVENOUS at 18:13

## 2017-11-16 RX ADMIN — GABAPENTIN 300 MG: 300 CAPSULE ORAL at 21:00

## 2017-11-16 RX ADMIN — SODIUM CHLORIDE: 9 INJECTION, SOLUTION INTRAVENOUS at 12:07

## 2017-11-16 RX ADMIN — IPRATROPIUM BROMIDE AND ALBUTEROL SULFATE 3 ML: .5; 3 SOLUTION RESPIRATORY (INHALATION) at 06:48

## 2017-11-16 RX ADMIN — FAMOTIDINE 20 MG: 20 TABLET, FILM COATED ORAL at 10:01

## 2017-11-16 RX ADMIN — METHYLPREDNISOLONE SODIUM SUCCINATE 62.5 MG: 125 INJECTION, POWDER, FOR SOLUTION INTRAMUSCULAR; INTRAVENOUS at 12:05

## 2017-11-16 RX ADMIN — STANDARDIZED SENNA CONCENTRATE AND DOCUSATE SODIUM 2 TABLET: 8.6; 5 TABLET, FILM COATED ORAL at 21:18

## 2017-11-16 RX ADMIN — AMPICILLIN SODIUM AND SULBACTAM SODIUM 3 G: 2; 1 INJECTION, POWDER, FOR SOLUTION INTRAMUSCULAR; INTRAVENOUS at 02:01

## 2017-11-16 RX ADMIN — METHYLPREDNISOLONE SODIUM SUCCINATE 62.5 MG: 125 INJECTION, POWDER, FOR SOLUTION INTRAMUSCULAR; INTRAVENOUS at 06:38

## 2017-11-16 RX ADMIN — IPRATROPIUM BROMIDE AND ALBUTEROL SULFATE 3 ML: .5; 3 SOLUTION RESPIRATORY (INHALATION) at 23:38

## 2017-11-16 RX ADMIN — MAGNESIUM SULFATE HEPTAHYDRATE 2 G: 40 INJECTION, SOLUTION INTRAVENOUS at 10:01

## 2017-11-16 RX ADMIN — PROPOFOL 60 MCG/KG/MIN: 10 INJECTION, EMULSION INTRAVENOUS at 21:18

## 2017-11-16 RX ADMIN — METHYLPREDNISOLONE SODIUM SUCCINATE 62.5 MG: 125 INJECTION, POWDER, FOR SOLUTION INTRAMUSCULAR; INTRAVENOUS at 01:54

## 2017-11-16 RX ADMIN — RIVAROXABAN 20 MG: 20 TABLET, FILM COATED ORAL at 18:13

## 2017-11-16 RX ADMIN — AMPICILLIN SODIUM AND SULBACTAM SODIUM 3 G: 2; 1 INJECTION, POWDER, FOR SOLUTION INTRAMUSCULAR; INTRAVENOUS at 05:15

## 2017-11-16 RX ADMIN — IPRATROPIUM BROMIDE AND ALBUTEROL SULFATE 3 ML: .5; 3 SOLUTION RESPIRATORY (INHALATION) at 19:45

## 2017-11-16 RX ADMIN — AZITHROMYCIN FOR INJECTION INJECTION, POWDER, LYOPHILIZED, FOR SOLUTION 500 MG: 500 INJECTION INTRAVENOUS at 11:15

## 2017-11-16 RX ADMIN — NOREPINEPHRINE BITARTRATE 10 MCG/MIN: 1 INJECTION INTRAVENOUS at 02:07

## 2017-11-16 RX ADMIN — AMPICILLIN SODIUM AND SULBACTAM SODIUM 3 G: 2; 1 INJECTION, POWDER, FOR SOLUTION INTRAMUSCULAR; INTRAVENOUS at 18:13

## 2017-11-16 RX ADMIN — CHLORHEXIDINE GLUCONATE 15 ML: 1.2 RINSE ORAL at 21:18

## 2017-11-16 RX ADMIN — AMPICILLIN SODIUM AND SULBACTAM SODIUM 3 G: 2; 1 INJECTION, POWDER, FOR SOLUTION INTRAMUSCULAR; INTRAVENOUS at 12:05

## 2017-11-16 ASSESSMENT — PATIENT HEALTH QUESTIONNAIRE - PHQ9
SUM OF ALL RESPONSES TO PHQ9 QUESTIONS 1 AND 2: 0
2. FEELING DOWN, DEPRESSED, IRRITABLE, OR HOPELESS: NOT AT ALL
1. LITTLE INTEREST OR PLEASURE IN DOING THINGS: NOT AT ALL
SUM OF ALL RESPONSES TO PHQ QUESTIONS 1-9: 0

## 2017-11-16 ASSESSMENT — LIFESTYLE VARIABLES
ALCOHOL_USE: NO
EVER_SMOKED: NEVER

## 2017-11-16 NOTE — ED NOTES
Medication titrated for appropriate sedation during central line insertion. Pt resting comfortably at this time. Family back to bedside. Dr. Day at bedside.

## 2017-11-16 NOTE — H&P
[facility]  [facility]    CHIEF COMPLAINT:  Dyspnea and altered mentation.    HISTORY OF PRESENT ILLNESS:  The patient is a 77-year-old male with history of   stasis dermatitis and COPD, is normally on oxygen at 4 liters.  His daughter   checked on him around 12:30 and noted that he was altered and twitching and   slow to respond.  She called the paramedics and when they arrived, he was   noted to be hypoxic with oxygen saturations in the 80% on his 4 liters of   oxygen.  He was placed on an oxygen mask and transferred to the emergency   room.  In the emergency room, he was noted to have pH of 7.16 and pCO2 of 79.7   and required urgent intubation.  History is as above, otherwise unable to   obtain due to his medical condition.    REVIEW OF SYSTEMS:  As above, otherwise unable to obtain due to his medical   condition.    PAST MEDICAL HISTORY:  Significant for:  1.  History of PE, on chronic anticoagulation with Xarelto.  2.  COPD with chronic respiratory failure, on 4 liters of oxygen at baseline.  3.  History of CHF, unknown ejection fraction.  4.  History of cellulitis.  5.  History of stasis dermatitis, skin ulcers.    PAST SURGICAL HISTORY:  Obtained from medical records, hernia repair.    SOCIAL HISTORY:  He is an ex-smoker, no alcohol or illicit drug use.    FAMILY HISTORY:  Unable to obtain due to his medical condition.    CURRENT MEDICATIONS:  Lipitor 20 mg daily, carvedilol 12.5 mg b.i.d., Lasix 20   mg daily, gabapentin 300 mg at bedtime, lisinopril 20 mg at bedtime, MSIR 50   mg every 8 hours as needed, Xarelto 15 mg with dinner.    PHYSICAL EXAMINATION:  GENERAL:  The patient is intubated.  VITAL SIGNS:  Temperature is 36.7, pulse is 87, respiratory rate is 22, blood   pressure was 78/40 and pulse oximetry is 97%.  HEAD AND NECK:  Pupils equal.  Supple neck.  ET tube in place.  Trachea is in   the midline.  HEART:  Regular rate and rhythm, normal S1, S2.  No murmurs, rubs or gallops.  LUNGS:   He has bibasilar rales.  He has symmetric air entry bilaterally.  He   has bilateral expiratory wheezing.  ABDOMEN:  Soft, nontender.  Bowel sounds are positive.  No hepatosplenomegaly.  EXTREMITIES:  +1 edema, no clubbing, no cyanosis.  He has erythema in both   distal lower extremities.  He has a right lower extremity wound which is   dressed with clean dressing.  SKIN:  He has the above noted changes in his lower extremities.    DIAGNOSTICS:  White blood cell count is 9.0, hemoglobin 12.1, hematocrit 39.2   and platelet count 235.  Sodium 126, potassium is 5.2, chloride 92,   bicarbonate 31, glucose 128, BUN 16 and creatinine 1.82.  LFTs are normal.    ABG, pH is 7.16, pCO2 of 79.7, pO2 of 85, oxygen saturation 94.1.  Chest x-ray   reveals mild bilateral lung base atelectasis with pneumonitis or edema not   excluded.    ASSESSMENT:  1.  Acute on chronic respiratory failure with hypercapnia.  2.  Chronic obstructive pulmonary disease with acute exacerbation.  3.  Possible pneumonia.  4.  History of pulmonary embolism, on chronic anticoagulation with Xarelto.  5.  Hyponatremia, unknown chronicity.  6.  Acute kidney injury.  His last available serum creatinine was 1.1.  7.  Shock, likely secondary to sedation.  8.  History of congestive heart failure, unknown ejection fraction.    PLAN:  The patient will be admitted and closely monitored in the intensive   care unit.    He will be started on IV ceftriaxone and azithromycin.  We will follow up on   culture results and deescalate accordingly.    We will hold his diuretics, lisinopril and carvedilol given his hypertension.    We will start him on IV fluid resuscitation.    We will monitor his renal function and electrolytes and if not improved with   IV hydration, we will proceed with further workup with renal ultrasound and   urine electrolytes.    We will continue the Xarelto with close monitoring of renal function.  If   further deteriorates, we will need to  transition him to another anticoagulant   agent with heparin.    Dr. Day has been consulted to assist with vent management.    If his blood pressure does not respond to IV fluid resuscitation, we will plan   on placing central line and starting him on Levophed.    We will check an echocardiogram.    We will start him on IV Solu-Medrol.    We will start him on GI prophylaxis.    Plan of care reviewed with the patient's daughter and her questions answered.    Code status discussed with her.  At this time, the patient is full code.    The patient will likely require more than 2 midnights stay for treatment of   his medical condition.    Patient is critically ill.  Critical care time spent was 45 minutes, no   overlap and excluding any procedure time.       ____________________________________     MD HELADIO FRANCE / NTS    DD:  11/15/2017 21:28:45  DT:  11/15/2017 21:58:14    D#:  [id number]  Job#:  [id number]

## 2017-11-16 NOTE — ED NOTES
[facility]  Pt's family believes Pt took his morning medications but not completely sure   Allergies reviewed  Pt was started on a 10 day course of Clindamycin on 10/17  However it is unclear if Pt completed course or not

## 2017-11-16 NOTE — CARE PLAN
Problem: Ventilation Defect:  Goal: Ability to achieve and maintain unassisted ventilation or tolerate decreased levels of ventilator support  Adult Ventilation Update    Total Vent Days: 2    Patient Lines/Drains/Airways Status    Active Airway     Name: Placement date: Placement time: Site: Days:    Airway Group ET Tube Oral 8.0 11/15/17   1915   Oral   2              APVCMV  RR 22  Vt 430  PEEP 8  FiO2 50%

## 2017-11-16 NOTE — CONSULTS
DATE OF SERVICE:  11/15/2017    REQUESTING PHYSICIAN:  Dr. Zepeda.    CONSULTING PHYSICIAN:  Aaron Day MD    TYPE OF CONSULTATION:  Pulmonary medicine and critical care medicine.    REASON FOR CONSULTATION:  Evaluation and management of ventilator dependent   respiratory failure, acute exacerbation of chronic obstructive pulmonary   disease.    CHIEF COMPLAINT:  The patient cannot provide.    HISTORY OF PRESENT ILLNESS:  The entire history is obtained from the family at   the bedside, healthcare providers and the medical record as this gentleman   cannot give me any history.  I was kindly asked by Dr. Zepeda to see and   evaluate this gentleman regarding the above problem.  This is a 77-year-old   gentleman with a history of chronic hypoxemic respiratory failure, on 4 liters   of domiciliary oxygen 24 hours a day as well as chronic obstructive pulmonary   disease.  He also has chronic wounds in his lower extremities and is followed   at the [facility].  [facility]   and apparently was doing well at that time.    According to the family, he was noticed at about 12:30 today to have altered   mental status with some twitching and he seemed very slow to respond.  EMS was   activated.  He was found to have an oxygen saturation of 80% on his usual 4   liters of supplemental oxygen.  He was brought here to [facility].  In the emergency room, he was found to have significant hypercarbia   and he was intubated.  He is now on full mechanical ventilatory support.    He apparently has been chronically ill and it is unclear if he has had any   recent fevers, chills or sweats or worsening cough.  He does have a chronic   cough.    CURRENT MEDICATIONS:  Atorvastatin 20 mg a day, carvedilol 12.5 mg twice a   day, clindamycin 150 mg 3 times a day, furosemide 20 mg a day, gabapentin 300   mg at bedtime, lisinopril 20 mg a day, morphine IR 15 mg every 8  hours as   needed, rivaroxaban 15 mg a day.    ALLERGIES:  He has no known drug allergies.    PAST SURGICAL HISTORY:  He has had hernia repair.    ILLNESSES:  Chronic hypoxemic respiratory failure, on 4 liters of domiciliary   oxygen 24 hours a day, chronic obstructive pulmonary disease.  He has had a   pulmonary embolism in the past, systemic arterial hypertension, congestive   heart failure, lower extremity cellulitis, stasis dermatitis, dyslipidemia,   chronic back pain.    SOCIAL HISTORY:  He no longer smokes.  He does not abuse alcohol.    FAMILY HISTORY:  Noncontributory.    REVIEW OF SYSTEMS:  The AMA and CMS system review could not be performed as   this gentleman cannot give me any history.    PHYSICAL EXAMINATION:  VITAL SIGNS:  His temperature is 98.1, his blood pressure is 130/61, heart   rate 76 and respiratory rate is 22.  GENERAL:  He is a sedated man on the ventilator.  HEENT:  Normocephalic, atraumatic.  Sinuses are nontender.  Nares patent.    Oropharynx with moist mucous membranes.  NECK:  Trachea midline, supple.  CHEST:  Symmetrical.  HEART:  Distant heart tones, regular rhythm.  LUNGS:  Scattered expiratory wheezes and coarse crackles bilaterally with a   prolonged expiratory phase.  ABDOMEN:  Obese, soft, nondistended, nontender.  No masses.  EXTREMITIES:  No clubbing or cyanosis.  He has erythema and some increased   warmth in his lower legs with some wounds on the lower legs.  NEUROLOGIC:  He is sedated.    DIAGNOSTIC DATA:  His white blood cell count is 9000, hemoglobin 12.1,   hematocrit 39.2 and platelet count 235,000.  His sodium is 126, potassium 5.2,   chloride 92, CO2 31, BUN 16, creatinine 1.82 and glucose 128.  Lactic acid is   1.3.  Arterial blood gas reveals a pH of 7.19, pCO2 of 73, pO2 of 75.  His   chest x-ray shows some focally increased opacification in the left upper lobe   with some hazy opacification at both lung bases which is faint.    IMPRESSION:  1.  Ventilator  dependent respiratory failure.  2.  Acute on chronic hypercarbic and hypoxemic respiratory failure.  3.  Acute exacerbation of chronic obstructive pulmonary disease.  4.  Suspect community-acquired pneumonia.  5.  Hyponatremia.  6.  Acute kidney injury.  7.  Anemia.  8.  History of pulmonary embolism.  9.  Systemic arterial hypertension.  He is now hypotensive on vasopressor   support.  10.  History of congestive heart failure.  11.  Chronic lower extremity cellulitis.  He receives wound care at the [facility].  He is on clindamycin chronically.  12.  Dyslipidemia.  13.  Chronic back pain.    PLAN AND MEDICAL DECISION MAKING:  This gentleman is critically ill.  He is   going to be admitted to the intensive care unit.  He will remain intubated on   full mechanical ventilatory support.  Stress ulcer prophylaxis will be   provided.  We will continue his rivaroxaban.  I am going to place him on   intravenous methylprednisolone.  We will culture his blood and sputum.  We   will empirically place him on intravenous ampicillin/sulbactam as well as   doxycycline pending culture results.  The ampicillin/sulbactam would be good   coverage for community-acquired pneumonia as well as his lower extremity   cellulitis.  We will continue his wound care for his wounds on his lower legs.    I am going to get an echocardiogram.  His blood pressure is being supported   with norepinephrine.  A central venous catheter has been placed.  We will   hydrate him with intravenous crystalloid solution and place him on the sepsis   protocol.  We will follow his electrolytes and renal function very closely.    His pain will be controlled.  At the current time, his prognosis is quite   guarded and he is critically ill.  I have spent a total of 90 minutes   providing direct critical care services at the bedside.  There has been no   time overlap.  The time spent excludes the time spent performing procedures   (08418, 62094).    I have assessed  and reassessed this gentleman's ventilator waveforms,   respiratory status and airway mechanics.  I have also assessed and reassessed   this gentleman's heart rate and rhythm, blood pressure, hemodynamics and   actively titrated vasopressors.  He is at increased risk for worsening   respiratory system and cardiovascular system dysfunction.    The case has been reviewed with Dr. Guzman, nursing and respiratory therapy.    Thank you, Dr. Guzman, for allowing us to participate in the care of this   gentleman.  We will continue to follow him with great interest.       ____________________________________     MD MIRIAM MOSS / CARLEE    DD:  11/15/2017 22:10:00  DT:  11/15/2017 23:09:11    D#:  [id number]  Job#:  [id number]    cc: GAIL TRONCOSO MD, CLEVELAND GUZMAN MD

## 2017-11-16 NOTE — OP REPORT
DATE OF SERVICE:  11/16/2017    TITLE OF THE PROCEDURE:  Diagnostic and therapeutic flexible fiberoptic   bronchoscopy with bronchoalveolar lavage.    INDICATION FOR THE PROCEDURE:  Atelectasis and pneumonia.    POSTPROCEDURE DIAGNOSES:  1.  Normal endobronchial anatomy.  2.  Significant extrinsic compression of the left lower lobe with normal   overlying mucosa.  3.  Extrinsic compression of the right lower lobe and right middle lobe with   normal overlying mucosa.  4.  No definite endobronchial tumor identified.  5.  Thick yellow secretions, particularly in the left lower lobe.    NARRATIVE:  The patient was sedated, intubated and ventilated at the time of   this procedure.  The flexible fiberoptic bronchoscope was inserted through the   lumen of the endotracheal tube and advanced into the distal trachea without   difficulty.  The airways were examined to the subsegmental bronchus level   bilaterally.  The endobronchial anatomy was normal.  No tumor was identified.    There was significant extrinsic compression of the left lower lobe, right   lower lobe, and right middle lobe.  This made visualization of the distal   airways somewhat difficult and incomplete.  The overlying mucosa appeared   normal and once again no definite tumor was seen.  There was a significant   amount of juicy thick yellow secretions seen particularly in the left lower   lobe, all secretions were suctioned until clear.  Following this, I performed   bronchoalveolar lavage in the basilar segments of the left lower lobe using   the standard technique with good fluid return.  Bronchoalveolar lavage fluid   from the left lower lobe was submitted to the laboratory for cytology, Gram   stain, culture and sensitivity, acid fast bacilli smear and culture, and   fungal culture.  The patient tolerated the procedure quite nicely.  No   complications are apparent.  His heart rate and rhythm, blood pressure and   oxygen saturation were continuously  monitored.       ____________________________________     MD MIRIAM MOSS / CARLEE    DD:  11/16/2017 02:09:32  DT:  11/16/2017 02:33:49    D#:  [id number]  Job#:  [id number]

## 2017-11-16 NOTE — ED NOTES
Late entry: Intubation performed at bedside by Dr. Zepeda with Dr. Zepeda directing conscious sedation. Medications titrated by ERP per between ([id number]). Procedure began at 1912. Pt tolerated well and vital signs remained stable. Resuscitation equipment available. RT at bedside.

## 2017-11-16 NOTE — ASSESSMENT & PLAN NOTE
Baseline O2 is 4L by NC  Acute respiratory failure is due COPD exacerbation  Treat COPD  -slowly improving, pulmonary has signed off now

## 2017-11-16 NOTE — PROGRESS NOTES
"Pulmonary Critical Care Progress Note        DOS:  11/16/2017    Chief Complaint: hypoxia, aloc    History of Present Illness: 77 y.o. Male, O2 dep COPD 4 lpm, chronic leg wounds, h/o PE on rivaroxaban; found down in chair at home, min responsive, hypoxic and \"twitching.\"  Intubated in ED    ROS:  Respiratory: unable to perform due to the patient's inability to effectively communicate, Cardiac: unable to perform due to the patient's inability to effectively communicate, GI: unable to perform due to the patient's inability to effectively communicate.  All other systems negative.    Interval Events:  24 hour interval history reviewed    - remains on low dose norepi gtt   - full vent support, vent day 2   - family requests DNR but continue vent support   - CXR, labs, reviewed    PFSH:  No change.    Respiratory:  Seaman Vent Mode: APVCMV, Rate (breaths/min): 22, Vt Target (mL): 430, PEEP/CPAP: 8, FiO2: 50, Static Compliance (ml / cm H2O): 48, Control VTE (exp VT): 417  Pulse Oximetry: 94 %  Chest Tube Drains:          Exam: rhonchi bibasilar and diminished breath sounds moderate  ImagingAvailable data reviewed   Recent Labs      11/15/17   1820  11/15/17   1955  11/16/17   0451   [id number]  7.16*   --    --    [id number]  79.7*   --    --    [id number]  85.3   --    --    [id number]  94.1   --    --    ARTHCO3  28*   --    --    ARTBE  -3   --    --    ISTATAPH   --   7.170*  7.361*   ISTATAPCO2   --   78.8*  48.2*   ISTATAPO2   --   145*  200*   ISTATATCO2   --   31  29   [id number]   --   98  100*   ISTATARTHCO3   --   28.7*  27.3*   ISTATARTBE   --   -1  1   ISTATTEMP   --   37.6 C  35.9 C   ISTATFIO2   --   60  100   ISTATSPEC   --   Arterial  Arterial   ISTATAPHTC   --   7.162*  7.377*   [id number]   --   148*  195*       HemoDynamics:  Pulse: 85, Heart Rate (Monitored): 85  Blood Pressure : 128/55, NIBP: 128/65  CVP (mm Hg): (!) 16 MM HG    Exam: regular rate and rhythm  Imaging: Available data reviewed    "     Neuro:  GCS Total [employer] Coma Score: 7       Exam: Heavily sedated  Imaging: Available data reviewed    Fluids:  Intake/Output       17 07 - 11/15/17 0659 (Not Admitted) 11/15/17 07 - 17 0659 17 07 - 17 0659      6709-1893 4482-3780 Total 2189-3772 8751-6903 Total 9900-5235 9400-8919 Total       Intake    P.O.  --  -- --  --  0 0  --  -- --    P.O. -- -- -- -- 0 0 -- -- --    I.V.  --  -- --  --  371.3 371.3  189.3  -- 189.3    Vasopressin Volume -- -- -- -- 0 0 -- -- --    Propofol Volume -- -- -- -- 204.1 204.1 189.3 -- 189.3    Norepinephrine Volume -- -- -- -- 167.2 167.2 -- -- --    Total Intake -- -- -- -- 371.3 371.3 189.3 -- 189.3       Output    Urine  --  -- --  --  1275 1275  525  -- 525    Void (ml) -- -- -- -- 1275 1275 525 -- 525    Total Output -- -- -- -- 1275 1275 525 -- 525       Net I/O     -- -- -- -- -903.7 -903.7 -335.7 -- -335.7        Weight: 106.4 kg (234 lb 9.1 oz)  Recent Labs      11/15/17   1600  11/15/17   2319  17   0210  17   1250   SODIUM  126*  130*  132*  128*  131*   POTASSIUM  5.2   --   4.5   --    CHLORIDE  92*   --   97   --    CO2  31   --   26   --    BUN  16   --   18   --    CREATININE  1.82*   --   1.70*   --    MAGNESIUM   --    --   1.8   --    PHOSPHORUS   --    --   3.4   --    CALCIUM  9.3   --   8.2*   --        GI/Nutrition:  Exam: abdomen is soft and non-tender  Imaging: Available data reviewed  NPO  Liver Function  Recent Labs      11/15/17   1600  17   0210   ALTSGPT  8   --    ASTSGOT  13   --    ALKPHOSPHAT  84   --    TBILIRUBIN  0.5   --    GLUCOSE  128*  136*       Heme:  Recent Labs      11/15/17   1600  17   021   RBC  4.20*  3.67*   HEMOGLOBIN  12.1*  10.4*   HEMATOCRIT  39.2*  33.9*   PLATELETCT  235  216       Infectious Disease:  Monitored Temp  Av.7 °C (96.3 °F)  Min: 35.6 °C (96.1 °F)  Max: 35.9 °C (96.6 °F)  Temp  Av.8 °C (76.7 °F)  Min: 1 °C (33.8 °F)  Max: 36.8 °C (98.2  °F)  Micro: reviewed  Recent Labs      11/15/17   1600  11/16/17   0210   WBC  9.0  9.0   NEUTSPOLYS  76.10*  71.00   LYMPHOCYTES  12.80*  17.40*   MONOCYTES  7.30  8.20   EOSINOPHILS  2.90  2.70   BASOPHILS  0.30  0.30   ASTSGOT  13   --    ALTSGPT  8   --    ALKPHOSPHAT  84   --    TBILIRUBIN  0.5   --      Current Facility-Administered Medications   Medication Dose Frequency Provider Last Rate Last Dose   • azithromycin (ZITHROMAX) 500 mg in D5W 250 mL IVPB  500 mg Q24HRS Gary Lacey M.D.   Stopped at 11/16/17 1215   • propofol (DIPRIVAN) injection  0-80 mcg/kg/min Continuous Deven Zepeda M.D. 33.3 mL/hr at 11/16/17 1247 50 mcg/kg/min at 11/16/17 1247   • atorvastatin (LIPITOR) tablet 20 mg  20 mg DAILY Vivek Villalobos M.D.   20 mg at 11/16/17 1001   • gabapentin (NEURONTIN) capsule 300 mg  300 mg QHS Vivek Villalobos M.D.   Stopped at 11/15/17 2100   • rivaroxaban (XARELTO) tablet 15 mg  15 mg PM MEAL Vivek Villalobos M.D.   Stopped at 11/15/17 2030   • senna-docusate (PERICOLACE or SENOKOT S) 8.6-50 MG per tablet 2 Tab  2 Tab BID Vivek Villalobos M.D.   Stopped at 11/16/17 0900    And   • polyethylene glycol/lytes (MIRALAX) PACKET 1 Packet  1 Packet QDAY PRN Vivek Villalobos M.D.        And   • magnesium hydroxide (MILK OF MAGNESIA) suspension 30 mL  30 mL QDAY PRN Vivek Villalobos M.D.        And   • bisacodyl (DULCOLAX) suppository 10 mg  10 mg QDAY PRN Vivek Villalobos M.D.       • NS infusion   Continuous Vivek Villalobos M.D. 75 mL/hr at 11/16/17 1207     • acetaminophen (TYLENOL) tablet 650 mg  650 mg Q6HRS PRN Vivek Villalobos M.D.       • Pharmacy Consult Request ...Pain Management Review   PRN Vivek Villalobos M.D.        And   • oxycodone immediate-release (ROXICODONE) tablet 2.5 mg  2.5 mg Q3HRS PRN Vivek Villalobos M.D.        And   • oxycodone immediate-release (ROXICODONE) tablet 5 mg  5 mg Q3HRS PRN Vivek Villalobos M.D.        And   •  HYDROmorphone (DILAUDID) injection 0.25 mg  0.25 mg Q3HRS PRN Vivek Villalobos M.D.       • ondansetron (ZOFRAN) syringe/vial injection 4 mg  4 mg Q4HRS PRN Vivek Villalobos M.D.       • ondansetron (ZOFRAN ODT) dispertab 4 mg  4 mg Q4HRS PRN Vivek Villalobos M.D.       • ampicillin/sulbactam (UNASYN) 3 g in  mL IVPB  3 g Q6HRS Aaron Day M.D.   Stopped at 11/16/17 1235   • methylPREDNISolone sod succ (SOLU-MEDROL) 125 MG injection 62.5 mg  62.5 mg Q6HRS Aaron Day M.D.   62.5 mg at 11/16/17 1205   • Respiratory Care per Protocol   Continuous RT Aaron Day M.D.       • chlorhexidine (PERIDEX) 0.12 % solution 15 mL  15 mL BID Aaron Day M.D.   15 mL at 11/16/17 1001   • lidocaine (XYLOCAINE) 1%  injection  1-2 mL Q30 MIN PRN Aaron Day M.D.       • MD ALERT...Adult ICU Electrolyte Replacement per Pharmacy Protocol   pharmacy to dose Aaron Day M.D.       • fentaNYL (SUBLIMAZE) injection 25 mcg  25 mcg Q HOUR PRN Aaron Day M.D.        Or   • fentaNYL (SUBLIMAZE) injection 50 mcg  50 mcg Q HOUR PRN Aaron Day M.D.        Or   • fentaNYL (SUBLIMAZE) injection 100 mcg  100 mcg Q HOUR PRN Aaron Day M.D.       • fentaNYL (SUBLIMAZE) 50 mcg/mL in 50mL   Continuous Aaron Day M.D.   Stopped at 11/15/17 2215   • ipratropium-albuterol (DUONEB) nebulizer solution 3 mL  3 mL Q2HRS PRN (RT) Aaron Day M.D.       • ipratropium-albuterol (DUONEB) nebulizer solution 3 mL  3 mL Q4HRS (RT) Aaron Day M.D.   3 mL at 11/16/17 1025   • famotidine (PEPCID) tablet 20 mg  20 mg DAILY Aaron Day M.D.   20 mg at 11/16/17 1001    Or   • famotidine (PEPCID) injection 20 mg  20 mg DAILY Aaron Day M.D.       • insulin regular (HUMULIN R) injection 2-9 Units  2-9 Units 4X/DAY ACHS Aaron Day M.D.   Stopped at 11/16/17 0700    And   • glucose 4 g  chewable tablet 16 g  16 g Q15 MIN PRN Aaron Day M.D.        And   • dextrose 50% (D50W) injection 25 mL  25 mL Q15 MIN PRN Aaron Day M.D.       • NS infusion 3,333 mL  30 mL/kg Once PRN Aaron Day M.D.   Stopped at 11/15/17 1815   • NS (BOLUS) infusion 500 mL  500 mL Once PRN Aaron Day M.D.       • norepinephrine (LEVOPHED) 8 mg in  mL Infusion  0.5-30 mcg/min Continuous Aaron Day M.D.   Stopped at 11/16/17 0635    And   • vasopressin (VASOSTRICT) 20 Units in  mL Infusion  0.03 Units/min Continuous Aaron Day M.D.   Stopped at 11/15/17 2230     Last reviewed on 11/15/2017  7:32 PM by Joseph Wallace, [facility]    Quality  Measures:  Core Measures & Quality Metrics    Problems/Plan:  Acute on chronic hypercarbic and hypoxemic respiratory failure   - emergently intubated in ED 11/15   - cont full vent support   - not ready for SBT   - A-F  [facility]  Acute exacerbation of chronic obstructive pulmonary disease   - BD's   - continue IV solumedrol  Suspect community-acquired pneumonia   - empiric abx   - f/u cx's, BAL, PCT   - f/u CXR  Hyponatremia.  Acute kidney injury   - IVF, follow, avoid nephrotoxins  Anemia.  History of pulmonary embolism   - continue anticoagulation with rivaroxiban    - monitor renal fxn, HH  Systemic arterial hypertension   - hypotensive, as above, follow  History of congestive heart failure.  Chronic lower extremity cellulitis   [facility]    - chronic clindamycin    - cx's sent  Dyslipidemia.  Chronic back pain.  Obesity  Prophylaxis  Start TF, mobility  Updated POA/daughter at bedside; she endorses DNR status (no shock/cpr/acls) as previously documented but continue full vent support and aggressive care for now; He remains critically ill; I am actively titrating vasopressors, vent settings, IVF's abx and steroids.  High risk for further critical organ deterioration.      Discussed  patient condition and risk of morbidity and/or mortality with Family, RN, RT and QA team.    The patient remains critically ill.  Critical care time = 45 minutes in directly providing and coordinating critical care and extensive data review.  No time overlap and excludes procedures.

## 2017-11-16 NOTE — DISCHARGE PLANNING
Call to Marn[facility] at [facility] requesting advance directive.  Received.  Copy to chart. DTR Magalie Listed as Primary decision maker.     Patient now DNR

## 2017-11-16 NOTE — ED NOTES
Pt increasingly hypotensive. ERP and PMA notified. Central line equipment and consent at bedside. PharmD at bedside. Medications titrated per MAR.

## 2017-11-16 NOTE — PROCEDURES
DATE OF SERVICE:  11/15/2017    TITLE OF THE PROCEDURE:  Central venous catheter placement.    INDICATION FOR THE PROCEDURE:  Hypotension requiring vasopressor support.    NARRATIVE:  The right neck was prepped with chlorhexidine and draped in the   usual sterile fashion.  Xylocaine 1% solution was used for topical anesthesia.    A triple lumen central venous catheter was easily placed into the right   internal jugular vein under ultrasound guidance on the first attempt using the   technique described by Otilio without difficulty or apparent complication.    The line was sutured into place and a sterile dressing was placed over the   line.  All 3 ports flushed and returned venous blood easily.  The patient   tolerated the procedure quite nicely.  No complications were apparent.    Post-procedure chest x-ray shows the line in appropriate position.       ____________________________________     MD MIRIAM MOSS / CARLEE    DD:  11/15/2017 21:47:53  DT:  11/15/2017 22:09:57    D#:  [id number]  Job#:  [id number]

## 2017-11-16 NOTE — ED NOTES
Lab from Lab called with critical result of Ph-7.16, CO2-79.7 at 1840. Critical lab result read back to Lab. Dr. Zepeda notified of critical lab result at 1841.  Critical lab result read back by Dr. Zepeda.

## 2017-11-17 ENCOUNTER — APPOINTMENT (OUTPATIENT)
Dept: RADIOLOGY | Facility: MEDICAL CENTER | Age: 77
DRG: 166 | End: 2017-11-17
Attending: INTERNAL MEDICINE
Payer: MEDICARE

## 2017-11-17 LAB
ANION GAP SERPL CALC-SCNC: 8 MMOL/L (ref 0–11.9)
BACTERIA SPEC RESP CULT: NORMAL
BASE EXCESS BLDA CALC-SCNC: 0 MMOL/L (ref -4–3)
BASOPHILS # BLD AUTO: 0.1 % (ref 0–1.8)
BASOPHILS # BLD: 0.01 K/UL (ref 0–0.12)
BODY TEMPERATURE: NORMAL DEGREES
BUN SERPL-MCNC: 20 MG/DL (ref 8–22)
CALCIUM SERPL-MCNC: 8.7 MG/DL (ref 8.5–10.5)
CHLORIDE SERPL-SCNC: 99 MMOL/L (ref 96–112)
CO2 BLDA-SCNC: 25 MMOL/L (ref 20–33)
CO2 SERPL-SCNC: 24 MMOL/L (ref 20–33)
CREAT SERPL-MCNC: 1.29 MG/DL (ref 0.5–1.4)
EOSINOPHIL # BLD AUTO: 0 K/UL (ref 0–0.51)
EOSINOPHIL NFR BLD: 0 % (ref 0–6.9)
ERYTHROCYTE [DISTWIDTH] IN BLOOD BY AUTOMATED COUNT: 45.2 FL (ref 35.9–50)
GFR SERPL CREATININE-BSD FRML MDRD: 54 ML/MIN/1.73 M 2
GLUCOSE BLD-MCNC: 148 MG/DL (ref 65–99)
GLUCOSE BLD-MCNC: 163 MG/DL (ref 65–99)
GLUCOSE BLD-MCNC: 183 MG/DL (ref 65–99)
GLUCOSE SERPL-MCNC: 167 MG/DL (ref 65–99)
GRAM STN SPEC: NORMAL
HCO3 BLDA-SCNC: 24.3 MMOL/L (ref 17–25)
HCT VFR BLD AUTO: 30.8 % (ref 42–52)
HGB BLD-MCNC: 10.2 G/DL (ref 14–18)
IMM GRANULOCYTES # BLD AUTO: 0.04 K/UL (ref 0–0.11)
IMM GRANULOCYTES NFR BLD AUTO: 0.5 % (ref 0–0.9)
LYMPHOCYTES # BLD AUTO: 0.46 K/UL (ref 1–4.8)
LYMPHOCYTES NFR BLD: 6.2 % (ref 22–41)
MAGNESIUM SERPL-MCNC: 2.3 MG/DL (ref 1.5–2.5)
MCH RBC QN AUTO: 29 PG (ref 27–33)
MCHC RBC AUTO-ENTMCNC: 33.1 G/DL (ref 33.7–35.3)
MCV RBC AUTO: 87.5 FL (ref 81.4–97.8)
MONOCYTES # BLD AUTO: 0.15 K/UL (ref 0–0.85)
MONOCYTES NFR BLD AUTO: 2 % (ref 0–13.4)
NEUTROPHILS # BLD AUTO: 6.72 K/UL (ref 1.82–7.42)
NEUTROPHILS NFR BLD: 91.2 % (ref 44–72)
NRBC # BLD AUTO: 0 K/UL
NRBC BLD AUTO-RTO: 0 /100 WBC
O2/TOTAL GAS SETTING VFR VENT: 50 %
PCO2 BLDA: 36.3 MMHG (ref 26–37)
PCO2 TEMP ADJ BLDA: 35.9 MMHG (ref 26–37)
PH BLDA: 7.43 [PH] (ref 7.4–7.5)
PH TEMP ADJ BLDA: 7.44 [PH] (ref 7.4–7.5)
PHOSPHATE SERPL-MCNC: 2.5 MG/DL (ref 2.5–4.5)
PLATELET # BLD AUTO: 188 K/UL (ref 164–446)
PMV BLD AUTO: 9.3 FL (ref 9–12.9)
PO2 BLDA: 70 MMHG (ref 64–87)
PO2 TEMP ADJ BLDA: 68 MMHG (ref 64–87)
POTASSIUM SERPL-SCNC: 3.9 MMOL/L (ref 3.6–5.5)
RBC # BLD AUTO: 3.52 M/UL (ref 4.7–6.1)
SAO2 % BLDA: 94 % (ref 93–99)
SIGNIFICANT IND 70042: NORMAL
SITE SITE: NORMAL
SODIUM SERPL-SCNC: 131 MMOL/L (ref 135–145)
SOURCE SOURCE: NORMAL
SPECIMEN DRAWN FROM PATIENT: NORMAL
WBC # BLD AUTO: 7.4 K/UL (ref 4.8–10.8)

## 2017-11-17 PROCEDURE — 36600 WITHDRAWAL OF ARTERIAL BLOOD: CPT

## 2017-11-17 PROCEDURE — A9270 NON-COVERED ITEM OR SERVICE: HCPCS | Performed by: INTERNAL MEDICINE

## 2017-11-17 PROCEDURE — A9270 NON-COVERED ITEM OR SERVICE: HCPCS | Performed by: HOSPITALIST

## 2017-11-17 PROCEDURE — 700111 HCHG RX REV CODE 636 W/ 250 OVERRIDE (IP): Performed by: INTERNAL MEDICINE

## 2017-11-17 PROCEDURE — 700105 HCHG RX REV CODE 258: Performed by: HOSPITALIST

## 2017-11-17 PROCEDURE — 700102 HCHG RX REV CODE 250 W/ 637 OVERRIDE(OP): Performed by: HOSPITALIST

## 2017-11-17 PROCEDURE — 71010 DX-CHEST-PORTABLE (1 VIEW): CPT

## 2017-11-17 PROCEDURE — 85025 COMPLETE CBC W/AUTO DIFF WBC: CPT

## 2017-11-17 PROCEDURE — 770022 HCHG ROOM/CARE - ICU (200)

## 2017-11-17 PROCEDURE — 80048 BASIC METABOLIC PNL TOTAL CA: CPT

## 2017-11-17 PROCEDURE — 84100 ASSAY OF PHOSPHORUS: CPT

## 2017-11-17 PROCEDURE — 82962 GLUCOSE BLOOD TEST: CPT

## 2017-11-17 PROCEDURE — 83735 ASSAY OF MAGNESIUM: CPT

## 2017-11-17 PROCEDURE — 700111 HCHG RX REV CODE 636 W/ 250 OVERRIDE (IP): Performed by: HOSPITALIST

## 2017-11-17 PROCEDURE — 82803 BLOOD GASES ANY COMBINATION: CPT

## 2017-11-17 PROCEDURE — 700101 HCHG RX REV CODE 250: Performed by: INTERNAL MEDICINE

## 2017-11-17 PROCEDURE — 99233 SBSQ HOSP IP/OBS HIGH 50: CPT | Performed by: HOSPITALIST

## 2017-11-17 PROCEDURE — 94640 AIRWAY INHALATION TREATMENT: CPT

## 2017-11-17 PROCEDURE — 700111 HCHG RX REV CODE 636 W/ 250 OVERRIDE (IP): Performed by: EMERGENCY MEDICINE

## 2017-11-17 PROCEDURE — 94003 VENT MGMT INPAT SUBQ DAY: CPT

## 2017-11-17 PROCEDURE — 700102 HCHG RX REV CODE 250 W/ 637 OVERRIDE(OP): Performed by: INTERNAL MEDICINE

## 2017-11-17 RX ORDER — LISINOPRIL 20 MG/1
20 TABLET ORAL NIGHTLY
Status: DISCONTINUED | OUTPATIENT
Start: 2017-11-17 | End: 2017-11-22 | Stop reason: HOSPADM

## 2017-11-17 RX ORDER — CARVEDILOL 12.5 MG/1
12.5 TABLET ORAL 2 TIMES DAILY
Status: DISCONTINUED | OUTPATIENT
Start: 2017-11-17 | End: 2017-11-20

## 2017-11-17 RX ORDER — DEXTROSE MONOHYDRATE 25 G/50ML
25 INJECTION, SOLUTION INTRAVENOUS
Status: DISCONTINUED | OUTPATIENT
Start: 2017-11-17 | End: 2017-11-19

## 2017-11-17 RX ADMIN — SODIUM CHLORIDE: 9 INJECTION, SOLUTION INTRAVENOUS at 10:35

## 2017-11-17 RX ADMIN — METHYLPREDNISOLONE SODIUM SUCCINATE 62.5 MG: 125 INJECTION, POWDER, FOR SOLUTION INTRAMUSCULAR; INTRAVENOUS at 00:54

## 2017-11-17 RX ADMIN — PROPOFOL 80 MCG/KG/MIN: 10 INJECTION, EMULSION INTRAVENOUS at 23:11

## 2017-11-17 RX ADMIN — PROPOFOL 60 MCG/KG/MIN: 10 INJECTION, EMULSION INTRAVENOUS at 00:11

## 2017-11-17 RX ADMIN — PROPOFOL 60 MCG/KG/MIN: 10 INJECTION, EMULSION INTRAVENOUS at 05:05

## 2017-11-17 RX ADMIN — METHYLPREDNISOLONE SODIUM SUCCINATE 62.5 MG: 125 INJECTION, POWDER, FOR SOLUTION INTRAMUSCULAR; INTRAVENOUS at 23:12

## 2017-11-17 RX ADMIN — IPRATROPIUM BROMIDE AND ALBUTEROL SULFATE 3 ML: .5; 3 SOLUTION RESPIRATORY (INHALATION) at 03:32

## 2017-11-17 RX ADMIN — FAMOTIDINE 20 MG: 20 TABLET, FILM COATED ORAL at 08:49

## 2017-11-17 RX ADMIN — PROPOFOL 60 MCG/KG/MIN: 10 INJECTION, EMULSION INTRAVENOUS at 07:40

## 2017-11-17 RX ADMIN — PROPOFOL 60 MCG/KG/MIN: 10 INJECTION, EMULSION INTRAVENOUS at 02:20

## 2017-11-17 RX ADMIN — FENTANYL CITRATE 100 MCG: 50 INJECTION, SOLUTION INTRAMUSCULAR; INTRAVENOUS at 13:38

## 2017-11-17 RX ADMIN — IPRATROPIUM BROMIDE AND ALBUTEROL SULFATE 3 ML: .5; 3 SOLUTION RESPIRATORY (INHALATION) at 09:32

## 2017-11-17 RX ADMIN — AMPICILLIN SODIUM AND SULBACTAM SODIUM 3 G: 2; 1 INJECTION, POWDER, FOR SOLUTION INTRAMUSCULAR; INTRAVENOUS at 12:13

## 2017-11-17 RX ADMIN — METHYLPREDNISOLONE SODIUM SUCCINATE 62.5 MG: 125 INJECTION, POWDER, FOR SOLUTION INTRAMUSCULAR; INTRAVENOUS at 05:04

## 2017-11-17 RX ADMIN — IPRATROPIUM BROMIDE AND ALBUTEROL SULFATE 3 ML: .5; 3 SOLUTION RESPIRATORY (INHALATION) at 06:29

## 2017-11-17 RX ADMIN — FENTANYL CITRATE 100 MCG: 50 INJECTION, SOLUTION INTRAMUSCULAR; INTRAVENOUS at 14:57

## 2017-11-17 RX ADMIN — METHYLPREDNISOLONE SODIUM SUCCINATE 62.5 MG: 125 INJECTION, POWDER, FOR SOLUTION INTRAMUSCULAR; INTRAVENOUS at 19:24

## 2017-11-17 RX ADMIN — AMPICILLIN SODIUM AND SULBACTAM SODIUM 3 G: 2; 1 INJECTION, POWDER, FOR SOLUTION INTRAMUSCULAR; INTRAVENOUS at 00:54

## 2017-11-17 RX ADMIN — INSULIN HUMAN 2 UNITS: 100 INJECTION, SOLUTION PARENTERAL at 12:15

## 2017-11-17 RX ADMIN — PROPOFOL 60 MCG/KG/MIN: 10 INJECTION, EMULSION INTRAVENOUS at 10:34

## 2017-11-17 RX ADMIN — OXYCODONE HYDROCHLORIDE 5 MG: 5 TABLET ORAL at 23:30

## 2017-11-17 RX ADMIN — IPRATROPIUM BROMIDE AND ALBUTEROL SULFATE 3 ML: .5; 3 SOLUTION RESPIRATORY (INHALATION) at 14:51

## 2017-11-17 RX ADMIN — ATORVASTATIN CALCIUM 20 MG: 20 TABLET, FILM COATED ORAL at 08:49

## 2017-11-17 RX ADMIN — STANDARDIZED SENNA CONCENTRATE AND DOCUSATE SODIUM 2 TABLET: 8.6; 5 TABLET, FILM COATED ORAL at 21:16

## 2017-11-17 RX ADMIN — CHLORHEXIDINE GLUCONATE 15 ML: 1.2 RINSE ORAL at 21:16

## 2017-11-17 RX ADMIN — LISINOPRIL 20 MG: 20 TABLET ORAL at 23:12

## 2017-11-17 RX ADMIN — INSULIN HUMAN 2 UNITS: 100 INJECTION, SOLUTION PARENTERAL at 06:14

## 2017-11-17 RX ADMIN — AMPICILLIN SODIUM AND SULBACTAM SODIUM 3 G: 2; 1 INJECTION, POWDER, FOR SOLUTION INTRAMUSCULAR; INTRAVENOUS at 23:12

## 2017-11-17 RX ADMIN — STANDARDIZED SENNA CONCENTRATE AND DOCUSATE SODIUM 2 TABLET: 8.6; 5 TABLET, FILM COATED ORAL at 08:49

## 2017-11-17 RX ADMIN — IPRATROPIUM BROMIDE AND ALBUTEROL SULFATE 3 ML: .5; 3 SOLUTION RESPIRATORY (INHALATION) at 23:08

## 2017-11-17 RX ADMIN — AMPICILLIN SODIUM AND SULBACTAM SODIUM 3 G: 2; 1 INJECTION, POWDER, FOR SOLUTION INTRAMUSCULAR; INTRAVENOUS at 05:07

## 2017-11-17 RX ADMIN — GABAPENTIN 300 MG: 300 CAPSULE ORAL at 21:16

## 2017-11-17 RX ADMIN — FENTANYL CITRATE 100 MCG: 50 INJECTION, SOLUTION INTRAMUSCULAR; INTRAVENOUS at 16:53

## 2017-11-17 RX ADMIN — IPRATROPIUM BROMIDE AND ALBUTEROL SULFATE 3 ML: .5; 3 SOLUTION RESPIRATORY (INHALATION) at 18:08

## 2017-11-17 RX ADMIN — AZITHROMYCIN FOR INJECTION INJECTION, POWDER, LYOPHILIZED, FOR SOLUTION 500 MG: 500 INJECTION INTRAVENOUS at 08:50

## 2017-11-17 RX ADMIN — CARVEDILOL 12.5 MG: 12.5 TABLET, FILM COATED ORAL at 23:28

## 2017-11-17 RX ADMIN — PROPOFOL 50 MCG/KG/MIN: 10 INJECTION, EMULSION INTRAVENOUS at 13:37

## 2017-11-17 RX ADMIN — PROPOFOL 50 MCG/KG/MIN: 10 INJECTION, EMULSION INTRAVENOUS at 16:52

## 2017-11-17 RX ADMIN — RIVAROXABAN 20 MG: 20 TABLET, FILM COATED ORAL at 21:30

## 2017-11-17 RX ADMIN — AMPICILLIN SODIUM AND SULBACTAM SODIUM 3 G: 2; 1 INJECTION, POWDER, FOR SOLUTION INTRAMUSCULAR; INTRAVENOUS at 18:00

## 2017-11-17 RX ADMIN — METHYLPREDNISOLONE SODIUM SUCCINATE 62.5 MG: 125 INJECTION, POWDER, FOR SOLUTION INTRAMUSCULAR; INTRAVENOUS at 12:13

## 2017-11-17 RX ADMIN — CHLORHEXIDINE GLUCONATE 15 ML: 1.2 RINSE ORAL at 08:49

## 2017-11-17 RX ADMIN — PROPOFOL 50 MCG/KG/MIN: 10 INJECTION, EMULSION INTRAVENOUS at 21:17

## 2017-11-17 NOTE — PROGRESS NOTES
[facility]    Date of Service: 2017    Chief Complaint  77 y.o. male admitted 11/15/2017 with shortness of breath    Interval Problem Update  Remains intubated and sedated, unable to give interval history  A little more awake today, moves ext  Pressors have been titrated off, blood pressure is stable    Consultants/Specialty  Pulmonary    Disposition  Keep in ICU    Patient discussed on rounds with intensivist, RN, respiratory therapy, and pharmacy.    Review of Systems   Unable to perform ROS: Intubated      Physical Exam  Laboratory/Imaging   Hemodynamics  No data recorded.   Monitored Temp: 37.3 °C (99.1 °F)  Pulse  Av.4  Min: 53  Max: 104 Heart Rate (Monitored): 87  NIBP: 159/76 CVP (mm Hg): (!) 16 MM HG    Respiratory  Seaman Vent Mode: APVCMV, Rate (breaths/min): 22, PEEP/CPAP: 8, PEEP/CPAP: 8, FiO2: 50, P Peak (PIP): 21, P MEAN: 12   Respiration: (!) 22, Pulse Oximetry: 95 %     Work Of Breathing / Effort: Vented  RUL Breath Sounds: Clear, RML Breath Sounds: Diminished, RLL Breath Sounds: Diminished, LAKE Breath Sounds: Clear, LLL Breath Sounds: Diminished    Fluids    Intake/Output Summary (Last 24 hours) at 17 1536  Last data filed at 17 1200   Gross per 24 hour   Intake          3661.84 ml   Output             1230 ml   Net          2431.84 ml       Nutrition  Orders Placed This Encounter   Procedures   • Diet NPO     Standing Status:   Standing     Number of Occurrences:   1     Order Specific Question:   Type:     Answer:   Now [1]     Order Specific Question:   Restrict to:     Answer:   Strict [1]     Physical Exam   Constitutional: He appears well-developed and well-nourished.   HENT:   Head: Normocephalic.   Eyes: Conjunctivae and EOM are normal. Right eye exhibits no discharge. Left eye exhibits no discharge.   Cardiovascular: Normal rate, regular rhythm and intact distal pulses.    No murmur heard.  Pulmonary/Chest: He has no wheezes.   Equal chest rise  and fall  Mechanical breath sounds bilaterally  No overt wheezing   Abdominal: Soft. Bowel sounds are normal. He exhibits distension. There is no tenderness. There is no rebound.   Musculoskeletal: Normal range of motion. He exhibits edema.   Neurological:   Intubated and sedated  Moves ext spontaneously   Skin: Skin is warm and dry. He is not diaphoretic. No erythema.   Psychiatric: He has a normal mood and affect.       Recent Labs      11/15/17   1600  11/16/17   0210  11/17/17   0340   WBC  9.0  9.0  7.4   RBC  4.20*  3.67*  3.52*   HEMOGLOBIN  12.1*  10.4*  10.2*   HEMATOCRIT  39.2*  33.9*  30.8*   MCV  93.3  92.4  87.5   MCH  28.8  28.3  29.0   MCHC  30.9*  30.7*  33.1*   RDW  49.2  48.7  45.2   PLATELETCT  235  216  188   MPV  9.2  9.2  9.3     Recent Labs      11/15/17   1600   11/16/17   0210  11/16/17   1250  11/16/17 2003 11/17/17   0340   SODIUM  126*   < >  132*  128*  131*  132*  131*   POTASSIUM  5.2   --   4.5   --    --   3.9   CHLORIDE  92*   --   97   --    --   99   CO2  31   --   26   --    --   24   GLUCOSE  128*   --   136*   --    --   167*   BUN  16   --   18   --    --   20   CREATININE  1.82*   --   1.70*   --    --   1.29   CALCIUM  9.3   --   8.2*   --    --   8.7    < > = values in this interval not displayed.             Recent Labs      11/15/17   2319   TRIGLYCERIDE  151*          Assessment/Plan     * COPD exacerbation (CMS-HCC)- (present on admission)   Assessment & Plan    Continue solumedrol  Weakness part as tolerated  Antibiotics        LINDSEY (acute kidney injury) (CMS-HCC)- (present on admission)   Assessment & Plan    Unknown if patient has chronic kidney disease  Creatinine has improved significantly        Hyponatremia- (present on admission)   Assessment & Plan    Improved slightly with fluids        Pneumonia- (present on admission)   Assessment & Plan    Unasyn/azithromycin  Await culture results        Chronic pulmonary embolism (CMS-HCC)- (present on admission)    Assessment & Plan    Continue xarelto        Acute on chronic respiratory failure with hypoxia and hypercapnia (CMS-[facility])- (present on admission)   Assessment & Plan    Baseline O2 is 4L by NC  Acute respiratory failure is due COPD exacerbation  Treat COPD        Shock ([facility] [place], [place])- (present on admission)   Assessment & Plan    Blood pressures improved            Reviewed items::  Medications reviewed and Labs reviewed  Muniz catheter::  Unconscious / Sedated Patient on a Ventilator

## 2017-11-17 NOTE — WOUND TEAM
[facility]  Inpatient Services  Initial Wound and Skin Care Evaluation    Admission Date:  11/15/17     HPI, PMH, SH: Reviewed  Unit where seen by Wound Team:  [facility]       WOUND CONSULT RELATED TO:  BLE      SUBJECTIVE:  Intubated       Self Report / Pain Level:  No s/s of pain      OBJECTIVE:  In bed, previous dressings intact.     WOUND TYPE, LOCATION, CHARACTERISTICS (Pressure ulcers: location, stage, POA or date identified)    Location and type of wound:  BLE partial thickness wounds scattered          Periwound:  Pink, peeling areas mostly intact       Drainage:   Scant, SS    Tissue Type and %:  100% pink with areas of brown thin crust easily    removed    Wound Edges:  Peeling      Odor:    None    Exposed structure(s): None   S&S of Infection:  Mild erythema       Measurements:  (Taken 11/17/17)   Scattered ranging from 0.5x0.5xx<0.5 - 2x2x<0.5    INTERVENTIONS BY WOUND TEAM:  Previous dressings removed, BLE cleansed with wash cloth and soap and water.  sween 24 moisturizer applied to intact skin, adaptic over open areas, covered with ABD pad and secured with roll gauze.     Patient turned to left side, back and coccyx are pink, blanching and intact.     Interdisciplinary consultation:  RN, patient         EVALUATION:  Per DrLenore Note patient has history of dermatitis.  Appears he may have had some swelling by the creases in legs noted now.  Wound are shallow and do not seem grossly infected.  Continue to keep clean and change dressing daily.        Factors affecting wound healing:  COPD, pneumonia, LINDSEY     Goals:  Decrease in wound size by 1% each week.     NURSING PLAN OF CARE ORDERS (X):    Dressing changes: See Dressing Maintenance orders: X  Skin care: See Skin Care orders: X  Rectal tube care: See Rectal Tube Care orders:   Other orders:    RSKIN: CURRENT (X) ORDERED (O)  Q shift Temo:  X  Q shift pressure point assessments:  X  Pressure redistribution mattress        RIP    X  Bariatric  RIP      Bariatric foam        Heel float boots       Heels floated on pillows    X  Barrier wipes      Barrier Cream      Barrier paste      Sacral silicone dressing    X  Silicone O2 tubing      Anchorfast      Trach with Optifoam split foam       Waffle cushion      Rectal tube or BMS      Antifungal tx    Turn q 2 hours   X  Up to chair     Ambulate   PT/OT     Dietician      PO     TF X  TPN     PVN    NPO   # days   Other       WOUND TEAM PLAN OF CARE (X):   NPWT change 3 x week:        Dressing changes by wound team:       Follow up as needed:     X  Other (explain):    Anticipated discharge plans (X):  SNF:         X  Home Care:           Outpatient Wound Center:            Self Care:            Other:

## 2017-11-17 NOTE — PROGRESS NOTES
[facility] Progress Note    Date of Service: 2017    Chief Complaint  77 y.o. male admitted 11/15/2017 with shortness of breath    Interval Problem Update  Admitted last night, intubated  Patient intubated and sedated, unable to give interval history  On levophed to maintain blood pressure  Will awaken and move ext, not following commands    Consultants/Specialty  Pulmonary    Disposition  Keep in ICU    Patient discussed on rounds with intensivist, RN, respiratory therapy, and pharmacy.    Review of Systems   Unable to perform ROS: Intubated      Physical Exam  Laboratory/Imaging   Hemodynamics  Temp (24hrs), Av.9 °C (66 °F), Min:1 °C (33.8 °F), Max:36.8 °C (98.2 °F)   Temperature: (!) 1 °C (33.8 °F), Monitored Temp: 35.9 °C (96.6 °F)  Pulse  Av.4  Min: 53  Max: 104 Heart Rate (Monitored): 77  NIBP: 144/69 CVP (mm Hg): (!) 11 MM HG    Respiratory  Seaman Vent Mode: APVCMV, Rate (breaths/min): 22, PEEP/CPAP: 8, PEEP/CPAP: 8, FiO2: 50, P Peak (PIP): 20, P MEAN: 12   Respiration: (!) 22, Pulse Oximetry: 95 %     Work Of Breathing / Effort: Vented  RUL Breath Sounds: Diminished, RML Breath Sounds: Diminished, RLL Breath Sounds: Diminished, LAKE Breath Sounds: Diminished, LLL Breath Sounds: Diminished    Fluids    Intake/Output Summary (Last 24 hours) at 17  Last data filed at 17 1800   Gross per 24 hour   Intake           760.35 ml   Output             2100 ml   Net         -1339.65 ml       Nutrition  Orders Placed This Encounter   Procedures   • Diet NPO     Standing Status:   Standing     Number of Occurrences:   1     Order Specific Question:   Type:     Answer:   Now [1]     Order Specific Question:   Restrict to:     Answer:   Strict [1]     Physical Exam   Constitutional: He is oriented to person, place, and time. He appears well-developed and well-nourished. No distress.   HENT:   Head: Normocephalic.   Eyes: Conjunctivae and EOM are normal. Right eye exhibits no  discharge. Left eye exhibits no discharge.   Cardiovascular: Normal rate, regular rhythm and intact distal pulses.    No murmur heard.  Pulmonary/Chest:   Equal chest rise and fall  Mechanical breath sounds bilaterally  No overt wheezing   Abdominal: Soft. Bowel sounds are normal. He exhibits distension. There is no tenderness. There is no rebound.   Musculoskeletal: Normal range of motion. He exhibits edema.   Chronic appearing venous stasis   Neurological: He is alert and oriented to person, place, and time. No cranial nerve deficit.   Skin: Skin is warm and dry. He is not diaphoretic. No erythema.   Psychiatric: He has a normal mood and affect.       Recent Labs      11/15/17   1600  11/16/17   0210   WBC  9.0  9.0   RBC  4.20*  3.67*   HEMOGLOBIN  12.1*  10.4*   HEMATOCRIT  39.2*  33.9*   MCV  93.3  92.4   MCH  28.8  28.3   MCHC  30.9*  30.7*   RDW  49.2  48.7   PLATELETCT  235  216   MPV  9.2  9.2     Recent Labs      11/15/17   1600  11/15/17   2319  11/16/17   0210  11/16/17   1250   SODIUM  126*  130*  132*  128*  131*   POTASSIUM  5.2   --   4.5   --    CHLORIDE  92*   --   97   --    CO2  31   --   26   --    GLUCOSE  128*   --   136*   --    BUN  16   --   18   --    CREATININE  1.82*   --   1.70*   --    CALCIUM  9.3   --   8.2*   --              Recent Labs      11/15/17   2319   TRIGLYCERIDE  151*          Assessment/Plan     * COPD exacerbation (CMS-HCC)- (present on admission)   Assessment & Plan    Continue solumedrol  Continue RT protocol  Antibiotics        LINDSEY (acute kidney injury) (CMS-HCC)- (present on admission)   Assessment & Plan    Recent baseline is unknown  Avoid nephrotoxins        Hyponatremia- (present on admission)   Assessment & Plan    Improved slightly with fluids        Pneumonia- (present on admission)   Assessment & Plan    Unasyn/azithromycin  Await culture results        Chronic pulmonary embolism (CMS-HCC)- (present on admission)   Assessment & Plan    Continue xarelto         Acute on chronic respiratory failure with hypoxia and hypercapnia (CMS-[facility])- (present on admission)   Assessment & Plan    Baseline O2 is 4L by NC  Acute respiratory failure is due COPD exacerbation        Shock (CMS-[facility])- (present on admission)   Assessment & Plan    Titrate vasopressors to maintain MAP of 65            Reviewed items::  Medications reviewed and Labs reviewed  Muniz catheter::  Urinary Tract Retention or Urinary Tract Obstruction  Central line in place:  Vasopressors  Antibiotics:  Treating active infection/contamination beyond 24 hours perioperative coverage

## 2017-11-17 NOTE — PROGRESS NOTES
"Pulmonary Critical Care Progress Note        DOS:  11/17/2017    Chief Complaint: hypoxia, aloc    History of Present Illness: 77 y.o. Male, O2 dep COPD 4 lpm, chronic leg wounds, h/o PE on rivaroxaban; found down in chair at home, min responsive, hypoxic and \"twitching.\"  Intubated in ED    ROS:  Respiratory: unable to perform due to the patient's inability to effectively communicate, Cardiac: unable to perform due to the patient's inability to effectively communicate, GI: unable to perform due to the patient's inability to effectively communicate.  All other systems negative.    Interval Events:  24 hour interval history reviewed    - sedated, not following   - afebrile   - [place] TF   - leg wounds   - vent day 3, 8, 50%   - no pressors   - xarelto   - unasyn/azithro   -   Yesterday's Events:   - remains on low dose norepi gtt   - full vent support, vent day 2   - family requests DNR but continue vent support   - CXR, labs, reviewed    PFSH:  No change.    Respiratory:  [place] Vent Mode: APVCMV, Rate (breaths/min): 22, Vt Target (mL): 430, PEEP/CPAP: 8, FiO2: 50, Static Compliance (ml / cm H2O): 44, Control VTE (exp VT): 438  Pulse Oximetry: 94 %  Chest Tube Drains:          Exam: rhonchi bibasilar and diminished breath sounds moderate  ImagingAvailable data reviewed   Recent Labs      11/15/17   1820  11/15/17   1955  11/16/17   0451  11/17/17   0422   [id number]  7.16*   --    --    --    [id number]  79.7*   --    --    --    [id number]  85.3   --    --    --    TYNV2RHM  94.1   --    --    --    ARTHCO3  28*   --    --    --    ARTBE  -3   --    --    --    ISTATAPH   --   7.170*  7.361*  7.434   ISTATAPCO2   --   78.8*  48.2*  36.3   ISTATAPO2   --   145*  200*  70   ISTATATCO2   --   31  29  25   [id number]   --   98  100*  94   ISTATARTHCO3   --   28.7*  27.3*  24.3   ISTATARTBE   --   -1  1  0   ISTATTEMP   --   37.6 C  35.9 C  36.7 C   ISTATFIO2   --   60  100  50   ISTATSPEC   --   Arterial  Arterial  " Arterial   ISTATAPHTC   --   7.162*  7.377*  7.438   [id number]   --   148*  195*  68       HemoDynamics:  Pulse: 93, Heart Rate (Monitored): 93  NIBP: 135/62  CVP (mm Hg): (!) 12 MM HG    Exam: regular rate and rhythm  Imaging: Available data reviewed        Neuro:  GCS Total [place] Coma Score: 9       Exam: Heavily sedated, arouses, moves all ext, not f/c's consistently   Imaging: Available data reviewed    Fluids:  Intake/Output       11/15/17 0700 - 11/16/17 0659 11/16/17 0700 - 11/17/17 0659 11/17/17 0700 - 11/18/17 0659      0700-1859 1900-0659 Total 0700-1859 1900-0659 Total 0700-1859 1900-0659 Total       Intake    P.O.  --  0 0  --  0 0  --  -- --    P.O. -- 0 0 -- 0 0 -- -- --    I.V.  --  846.3 846.3  1489.1  1630.4 3119.5  150  -- 150    Magnesium Sulfate Volume -- -- -- -- 50 50 -- -- --    Vasopressin Volume -- 0 0 -- 0 0 -- -- --    Propofol Volume -- 204.1 204.1 389.1 469.4 858.5 -- -- --    Norepinephrine Volume -- 167.2 167.2 -- 11 11 -- -- --    IV Piggyback Volume (IV Piggyback) -- 100 100 200 200 400 -- -- --    IV Volume (NS) -- 375 375  150 -- 150    Other  --  -- --  --  -- --  30  -- 30    Medications (P.O./ Enteral Liquids) -- -- -- -- -- -- 30 -- 30    Enteral  --  -- --  --  385 385  130  -- 130    Enteral Volume -- -- -- -- 205 205 50 -- 50    Free Water / Tube Flush -- -- -- -- 180 180 80 -- 80    Total Intake -- 846.3 846.3 1489.1 2015.4 3504.5 310 -- 310       Output    Urine  --  1275 1275  825  635 1460  140  -- 140    Void (ml) -- 1275 1275  140 -- 140    Drains  --  -- --  --  0 0  --  -- --    Residual Amount (ml) (Discarded) -- -- -- -- 0 0 -- -- --    Stool  --  -- --  --  -- --  --  -- --    Number of Times Stooled -- -- -- -- 0 x 0 x 0 x -- 0 x    Total Output -- 1275 1275  140 -- 140       Net I/O     -- -428.7 -428.7 664.1 1380.4 2044.5 170 -- 170        Weight: 111.7 kg (246 lb 4.1 oz)  Recent Labs      11/15/17   1600   11/16/17    0210 11/16/17   1250  11/16/17 2003 11/17/17   0340   SODIUM  126*   < >  132*  128*  131*  132*  131*   POTASSIUM  5.2   --   4.5   --    --   3.9   CHLORIDE  92*   --   97   --    --   99   CO2  31   --   26   --    --   24   BUN  16   --   18   --    --   20   CREATININE  1.82*   --   1.70*   --    --   1.29   MAGNESIUM   --    --   1.8   --    --   2.3   PHOSPHORUS   --    --   3.4   --    --   2.5   CALCIUM  9.3   --   8.2*   --    --   8.7    < > = values in this interval not displayed.       GI/Nutrition:  Exam: abdomen is soft and non-tender  Imaging: Available data reviewed  NPO  Liver Function  Recent Labs      11/15/17   1600  11/16/17   0210  11/17/17   0340   ALTSGPT  8   --    --    ASTSGOT  13   --    --    ALKPHOSPHAT  84   --    --    TBILIRUBIN  0.5   --    --    GLUCOSE  128*  136*  167*       Heme:  Recent Labs      11/15/17   1600  11/16/17   0210  11/17/17   0340   RBC  4.20*  3.67*  3.52*   HEMOGLOBIN  12.1*  10.4*  10.2*   HEMATOCRIT  39.2*  33.9*  30.8*   PLATELETCT  235  216  188       Infectious Disease:  No Data Recorded  Micro: reviewed  Recent Labs      11/15/17   1600  11/16/17   0210  11/17/17   0340   WBC  9.0  9.0  7.4   NEUTSPOLYS  76.10*  71.00  91.20*   LYMPHOCYTES  12.80*  17.40*  6.20*   MONOCYTES  7.30  8.20  2.00   EOSINOPHILS  2.90  2.70  0.00   BASOPHILS  0.30  0.30  0.10   ASTSGOT  13   --    --    ALTSGPT  8   --    --    ALKPHOSPHAT  84   --    --    TBILIRUBIN  0.5   --    --      Current Facility-Administered Medications   Medication Dose Frequency Provider Last Rate Last Dose   • azithromycin (ZITHROMAX) 500 mg in D5W 250 mL IVPB  500 mg Q24HRS Gary Lacey M.D.   Stopped at 11/17/17 0950   • rivaroxaban (XARELTO) tablet 20 mg  20 mg PM MEAL Gary Lacey M.D.   20 mg at 11/16/17 1813   • propofol (DIPRIVAN) injection  0-80 mcg/kg/min Continuous Deven Zepeda M.D. 40 mL/hr at 11/17/17 1034 60 mcg/kg/min at 11/17/17 1034   • atorvastatin (LIPITOR) tablet 20 mg   20 mg DAILY Vivek Villalobos M.D.   20 mg at 11/17/17 0849   • gabapentin (NEURONTIN) capsule 300 mg  300 mg QHS Vivek Villalobos M.D.   300 mg at 11/16/17 2100   • senna-docusate (PERICOLACE or SENOKOT S) 8.6-50 MG per tablet 2 Tab  2 Tab BID Vivek Villalobos M.D.   2 Tab at 11/17/17 0849    And   • polyethylene glycol/lytes (MIRALAX) PACKET 1 Packet  1 Packet QDAY PRN Vivek Villalobos M.D.        And   • magnesium hydroxide (MILK OF MAGNESIA) suspension 30 mL  30 mL QDAY PRN Vivek Villalobos M.D.        And   • bisacodyl (DULCOLAX) suppository 10 mg  10 mg QDAY PRN Vivek Villalobos M.D.       • NS infusion   Continuous Vivek Villalobos M.D. 75 mL/hr at 11/17/17 1035     • acetaminophen (TYLENOL) tablet 650 mg  650 mg Q6HRS PRN Vivek Villalobos M.D.       • Pharmacy Consult Request ...Pain Management Review   PRN Vivek Villalobos M.D.        And   • oxycodone immediate-release (ROXICODONE) tablet 2.5 mg  2.5 mg Q3HRS PRN Vivek Villalobos M.D.        And   • oxycodone immediate-release (ROXICODONE) tablet 5 mg  5 mg Q3HRS PRN Vivek Villalobos M.D.        And   • HYDROmorphone (DILAUDID) injection 0.25 mg  0.25 mg Q3HRS PRN Vivek Villalobos M.D.       • ondansetron (ZOFRAN) syringe/vial injection 4 mg  4 mg Q4HRS PRN Vivek Villalobos M.D.       • ondansetron (ZOFRAN ODT) dispertab 4 mg  4 mg Q4HRS PRN Vivek Villalobos M.D.       • ampicillin/sulbactam (UNASYN) 3 g in  mL IVPB  3 g Q6HRS Gary Lacey M.D.   Stopped at 11/17/17 0537   • methylPREDNISolone sod succ (SOLU-MEDROL) 125 MG injection 62.5 mg  62.5 mg Q6HRS Aaron Day M.D.   62.5 mg at 11/17/17 0504   • Respiratory Care per Protocol   Continuous RT Aaron Day M.D.       • chlorhexidine (PERIDEX) 0.12 % solution 15 mL  15 mL BID Aaron Day M.D.   15 mL at 11/17/17 0853   • lidocaine (XYLOCAINE) 1%  injection  1-2 mL Q30 MIN PRN Aaron Day M.D.       •  MD ALERT...Adult ICU Electrolyte Replacement per Pharmacy Protocol   pharmacy to dose Aaron Day M.D.       • fentaNYL (SUBLIMAZE) injection 25 mcg  25 mcg Q HOUR PRN Aaron Day M.D.        Or   • fentaNYL (SUBLIMAZE) injection 50 mcg  50 mcg Q HOUR PRN Aaron Day M.D.        Or   • fentaNYL (SUBLIMAZE) injection 100 mcg  100 mcg Q HOUR PRN Aaron Day M.D.       • fentaNYL (SUBLIMAZE) 50 mcg/mL in 50mL   Continuous Aaron Day M.D.   Stopped at 11/15/17 2215   • ipratropium-albuterol (DUONEB) nebulizer solution 3 mL  3 mL Q2HRS PRN (RT) Aaron Day M.D.       • ipratropium-albuterol (DUONEB) nebulizer solution 3 mL  3 mL Q4HRS (RT) Aaron Day M.D.   3 mL at 11/17/17 0932   • famotidine (PEPCID) tablet 20 mg  20 mg DAILY Aaron Day M.D.   20 mg at 11/17/17 0849    Or   • famotidine (PEPCID) injection 20 mg  20 mg DAILY Aaron Day M.D.       • insulin regular (HUMULIN R) injection 2-9 Units  2-9 Units 4X/DAY ACHS Aaron Day M.D.   2 Units at 11/17/17 0614    And   • glucose 4 g chewable tablet 16 g  16 g Q15 MIN PRN Aaron Day M.D.        And   • dextrose 50% (D50W) injection 25 mL  25 mL Q15 MIN PRN Aaron Day M.D.       • NS infusion 3,333 mL  30 mL/kg Once PRN Aaron Day M.D.   Stopped at 11/15/17 1815   • NS (BOLUS) infusion 500 mL  500 mL Once PRN Aaron Day M.D.       • norepinephrine (LEVOPHED) 8 mg in  mL Infusion  0.5-30 mcg/min Continuous Aaron Day M.D.   Stopped at 11/16/17 0635    And   • vasopressin (VASOSTRICT) 20 Units in  mL Infusion  0.03 Units/min Continuous Aaron Day M.D.   Stopped at 11/15/17 5195     Last reviewed on 11/15/2017  7:32 PM by Joseph Wallace, [facility]    Quality  Measures:  Core Measures & Quality Metrics    Problems/Plan:  Acute on chronic hypercarbic and hypoxemic respiratory  failure   - emergently intubated in ED 11/15   - cont full vent support   - start SBT but not yet appropriate for liberation  Acute exacerbation of chronic obstructive pulmonary disease   - BD's   - continue IV solumedrol with slow taper  Suspect community-acquired pneumonia   - empiric abx   - Currently Unasyn and azithromycin, follow cultures  Hyponatremia.  Acute kidney injury   - IVF, follow, avoid nephrotoxins  Anemia.  History of pulmonary embolism   - continue anticoagulation with rivaroxiban    - monitor renal fxn, HH-remained stable currently with no evidence of active bleeding  Systemic arterial hypertension   -Currently hypotensive, monitor, and initiate antihypertensives when appropriate  History of congestive heart failure.  Chronic lower extremity cellulitis   [facility]    - chronic clindamycin    - cx's sent  Dyslipidemia.  Chronic back pain.  Obesity  Prophylaxis  Start TF, mobility  DNR status (no shock/cpr/acls) as previously documented but continue full vent support and aggressive care for now  He does remain critically ill with high risk for further critical organ deterioration.  I have titrated ventilator settings, IV medications and replaced critical electrolytes today. Further additional follow    Discussed patient condition and risk of morbidity and/or mortality with Family, RN, RT and QA team.    The patient remains critically ill.  Critical care time = 36 minutes in directly providing and coordinating critical care and extensive data review.  No time overlap and excludes procedures.

## 2017-11-17 NOTE — PROGRESS NOTES
Report given to SICU JENSEN Biswas. Pt transferred with RN and RT for lateral transfer to SICU 127. Pt now in room, handoff given to JENSEN Biswas.

## 2017-11-17 NOTE — PROGRESS NOTES
1900- Report given by Donna De Jesus. No acute distress or dicomfort noted. Resp even, nonlabored, and vent controlled. Call bell within reach of daughter, Magalie, who remains at bedside. Bed low & locked, sr 3, all monitors on, and close supervision maintained by nursing. Muniz draining. Propofol currently at 60mcg an pressors on pause. Cortrak being placed by nursing. Will verify via Xr.  2140- Xray read by radiology and approved for use by nursing. TF to start.  0545- 12 hr chart check completed  Monitor Summary: NSR with PACs  0.20/0.10/0.34

## 2017-11-17 NOTE — CARE PLAN
Problem: Ventilation Defect:  Goal: Ability to achieve and maintain unassisted ventilation or tolerate decreased levels of ventilator support  Outcome: PROGRESSING SLOWER THAN EXPECTED  Adult Ventilation Update    Total Vent Days: 3    Patient Lines/Drains/Airways Status    Active Airway     Name: Placement date: Placement time: Site: Days:    Airway Group ET Tube Oral 8.0 11/15/17   1915   Oral   3                Plateau Pressure (Q Shift): 18 (11/16/17 1948)  Static Compliance (ml / cm H2O): 39.6 (11/17/17 0333)    Patient failed trials because of Barriers to Wean: Other (Comments) (not ready per Dr. Harper) (11/16/17 8849)  Barriers to SBT    Length of Weaning Trial      CMV 22/430/8/50%       Sputum/Suction   Cough: Weak;Non Productive (11/17/17 0400)  Sputum Amount: Small (11/17/17 0333)  Sputum Color: White (11/17/17 0333)  Sputum Consistency: Thin;Thick (11/17/17 0333)        Events/Summary/Plan: ABG drawn. No ventilator changes made.

## 2017-11-17 NOTE — CARE PLAN
Problem: Ventilation Defect:  Goal: Ability to achieve and maintain unassisted ventilation or tolerate decreased levels of ventilator support  Outcome: PROGRESSING AS EXPECTED  Adult Ventilation Update    Total Vent Days: 2    Patient Lines/Drains/Airways Status    Active Airway     Name: Placement date: Placement time: Site: Days:    Airway Group ET Tube Oral 8.0 11/15/17   1915   Oral   less than 1              APV-CMV x 22, 430, +8, 50%       Plateau Pressure (Q Shift): 19 (11/16/17 1439)  Static Compliance (ml / cm H2O): 40 (11/16/17 1439)    Patient failed trials because of Barriers to Wean: Other (Comments) (not ready per Dr. Harper) (11/16/17 1439)    Sputum/Suction   Cough: Non Productive (11/16/17 0352)  Sputum Amount: Moderate (11/16/17 1600)  Sputum Color: White (11/16/17 1600)  Sputum Consistency: Thick (11/16/17 1600)    Mobility Group  Activity Performed: Unable to mobilize (11/16/17 0100)  Pt Calls for Assistance: No (11/16/17 0100)  Reason Not Mobilized: Unstable condition (BP/HR) (11/16/17 0100)    Events/Summary/Plan: no vent changes made shift

## 2017-11-18 ENCOUNTER — APPOINTMENT (OUTPATIENT)
Dept: RADIOLOGY | Facility: MEDICAL CENTER | Age: 77
DRG: 166 | End: 2017-11-18
Attending: INTERNAL MEDICINE
Payer: MEDICARE

## 2017-11-18 LAB
ANION GAP SERPL CALC-SCNC: 4 MMOL/L (ref 0–11.9)
BACTERIA SPEC RESP CULT: ABNORMAL
BACTERIA UR CULT: NORMAL
BASE EXCESS BLDA CALC-SCNC: 0 MMOL/L (ref -4–3)
BASOPHILS # BLD AUTO: 0.2 % (ref 0–1.8)
BASOPHILS # BLD: 0.01 K/UL (ref 0–0.12)
BODY TEMPERATURE: ABNORMAL DEGREES
BUN SERPL-MCNC: 28 MG/DL (ref 8–22)
CALCIUM SERPL-MCNC: 8.4 MG/DL (ref 8.5–10.5)
CHLORIDE SERPL-SCNC: 100 MMOL/L (ref 96–112)
CO2 BLDA-SCNC: 26 MMOL/L (ref 20–33)
CO2 SERPL-SCNC: 26 MMOL/L (ref 20–33)
CREAT SERPL-MCNC: 1.16 MG/DL (ref 0.5–1.4)
EOSINOPHIL # BLD AUTO: 0 K/UL (ref 0–0.51)
EOSINOPHIL NFR BLD: 0 % (ref 0–6.9)
ERYTHROCYTE [DISTWIDTH] IN BLOOD BY AUTOMATED COUNT: 46.5 FL (ref 35.9–50)
GFR SERPL CREATININE-BSD FRML MDRD: >60 ML/MIN/1.73 M 2
GLUCOSE BLD-MCNC: 145 MG/DL (ref 65–99)
GLUCOSE BLD-MCNC: 183 MG/DL (ref 65–99)
GLUCOSE BLD-MCNC: 187 MG/DL (ref 65–99)
GLUCOSE SERPL-MCNC: 191 MG/DL (ref 65–99)
GRAM STN SPEC: ABNORMAL
HCO3 BLDA-SCNC: 24.8 MMOL/L (ref 17–25)
HCT VFR BLD AUTO: 30.7 % (ref 42–52)
HGB BLD-MCNC: 10.2 G/DL (ref 14–18)
IMM GRANULOCYTES # BLD AUTO: 0.05 K/UL (ref 0–0.11)
IMM GRANULOCYTES NFR BLD AUTO: 0.8 % (ref 0–0.9)
LYMPHOCYTES # BLD AUTO: 0.49 K/UL (ref 1–4.8)
LYMPHOCYTES NFR BLD: 7.7 % (ref 22–41)
MAGNESIUM SERPL-MCNC: 2.2 MG/DL (ref 1.5–2.5)
MCH RBC QN AUTO: 29.7 PG (ref 27–33)
MCHC RBC AUTO-ENTMCNC: 33.2 G/DL (ref 33.7–35.3)
MCV RBC AUTO: 89.2 FL (ref 81.4–97.8)
MONOCYTES # BLD AUTO: 0.22 K/UL (ref 0–0.85)
MONOCYTES NFR BLD AUTO: 3.5 % (ref 0–13.4)
NEUTROPHILS # BLD AUTO: 5.59 K/UL (ref 1.82–7.42)
NEUTROPHILS NFR BLD: 87.8 % (ref 44–72)
NRBC # BLD AUTO: 0 K/UL
NRBC BLD AUTO-RTO: 0 /100 WBC
O2/TOTAL GAS SETTING VFR VENT: 50 %
PCO2 BLDA: 38.3 MMHG (ref 26–37)
PCO2 TEMP ADJ BLDA: 37.5 MMHG (ref 26–37)
PH BLDA: 7.42 [PH] (ref 7.4–7.5)
PH TEMP ADJ BLDA: 7.43 [PH] (ref 7.4–7.5)
PHOSPHATE SERPL-MCNC: 3.1 MG/DL (ref 2.5–4.5)
PLATELET # BLD AUTO: 209 K/UL (ref 164–446)
PMV BLD AUTO: 9.8 FL (ref 9–12.9)
PO2 BLDA: 71 MMHG (ref 64–87)
PO2 TEMP ADJ BLDA: 69 MMHG (ref 64–87)
POTASSIUM SERPL-SCNC: 3.9 MMOL/L (ref 3.6–5.5)
RBC # BLD AUTO: 3.44 M/UL (ref 4.7–6.1)
SAO2 % BLDA: 94 % (ref 93–99)
SIGNIFICANT IND 70042: ABNORMAL
SIGNIFICANT IND 70042: NORMAL
SITE SITE: ABNORMAL
SITE SITE: NORMAL
SODIUM SERPL-SCNC: 130 MMOL/L (ref 135–145)
SOURCE SOURCE: ABNORMAL
SOURCE SOURCE: NORMAL
SPECIMEN DRAWN FROM PATIENT: ABNORMAL
WBC # BLD AUTO: 6.4 K/UL (ref 4.8–10.8)

## 2017-11-18 PROCEDURE — 700105 HCHG RX REV CODE 258: Performed by: HOSPITALIST

## 2017-11-18 PROCEDURE — A9270 NON-COVERED ITEM OR SERVICE: HCPCS | Performed by: HOSPITALIST

## 2017-11-18 PROCEDURE — 700105 HCHG RX REV CODE 258: Performed by: INTERNAL MEDICINE

## 2017-11-18 PROCEDURE — 71010 DX-CHEST-PORTABLE (1 VIEW): CPT

## 2017-11-18 PROCEDURE — 700101 HCHG RX REV CODE 250: Performed by: INTERNAL MEDICINE

## 2017-11-18 PROCEDURE — 700111 HCHG RX REV CODE 636 W/ 250 OVERRIDE (IP): Performed by: HOSPITALIST

## 2017-11-18 PROCEDURE — 80048 BASIC METABOLIC PNL TOTAL CA: CPT

## 2017-11-18 PROCEDURE — 700102 HCHG RX REV CODE 250 W/ 637 OVERRIDE(OP): Performed by: INTERNAL MEDICINE

## 2017-11-18 PROCEDURE — 700102 HCHG RX REV CODE 250 W/ 637 OVERRIDE(OP): Performed by: HOSPITALIST

## 2017-11-18 PROCEDURE — 770022 HCHG ROOM/CARE - ICU (200)

## 2017-11-18 PROCEDURE — 82803 BLOOD GASES ANY COMBINATION: CPT

## 2017-11-18 PROCEDURE — 85025 COMPLETE CBC W/AUTO DIFF WBC: CPT

## 2017-11-18 PROCEDURE — 94640 AIRWAY INHALATION TREATMENT: CPT

## 2017-11-18 PROCEDURE — A9270 NON-COVERED ITEM OR SERVICE: HCPCS | Performed by: INTERNAL MEDICINE

## 2017-11-18 PROCEDURE — 84100 ASSAY OF PHOSPHORUS: CPT

## 2017-11-18 PROCEDURE — 700111 HCHG RX REV CODE 636 W/ 250 OVERRIDE (IP): Performed by: EMERGENCY MEDICINE

## 2017-11-18 PROCEDURE — 700111 HCHG RX REV CODE 636 W/ 250 OVERRIDE (IP): Performed by: INTERNAL MEDICINE

## 2017-11-18 PROCEDURE — 82962 GLUCOSE BLOOD TEST: CPT | Mod: 91

## 2017-11-18 PROCEDURE — 36600 WITHDRAWAL OF ARTERIAL BLOOD: CPT

## 2017-11-18 PROCEDURE — 94003 VENT MGMT INPAT SUBQ DAY: CPT

## 2017-11-18 PROCEDURE — 83735 ASSAY OF MAGNESIUM: CPT

## 2017-11-18 RX ORDER — HYDRALAZINE HYDROCHLORIDE 20 MG/ML
10-20 INJECTION INTRAMUSCULAR; INTRAVENOUS EVERY 4 HOURS PRN
Status: DISCONTINUED | OUTPATIENT
Start: 2017-11-18 | End: 2017-11-22 | Stop reason: HOSPADM

## 2017-11-18 RX ORDER — METHYLPREDNISOLONE SODIUM SUCCINATE 40 MG/ML
40 INJECTION, POWDER, LYOPHILIZED, FOR SOLUTION INTRAMUSCULAR; INTRAVENOUS EVERY 6 HOURS
Status: COMPLETED | OUTPATIENT
Start: 2017-11-18 | End: 2017-11-18

## 2017-11-18 RX ORDER — FUROSEMIDE 10 MG/ML
20 INJECTION INTRAMUSCULAR; INTRAVENOUS
Status: DISCONTINUED | OUTPATIENT
Start: 2017-11-19 | End: 2017-11-22 | Stop reason: HOSPADM

## 2017-11-18 RX ORDER — OXYCODONE HYDROCHLORIDE 10 MG/1
10 TABLET ORAL EVERY 8 HOURS
Status: DISCONTINUED | OUTPATIENT
Start: 2017-11-18 | End: 2017-11-22 | Stop reason: HOSPADM

## 2017-11-18 RX ORDER — FUROSEMIDE 20 MG/1
20 TABLET ORAL
Status: DISCONTINUED | OUTPATIENT
Start: 2017-11-18 | End: 2017-11-18

## 2017-11-18 RX ADMIN — PROPOFOL 80 MCG/KG/MIN: 10 INJECTION, EMULSION INTRAVENOUS at 03:23

## 2017-11-18 RX ADMIN — AMPICILLIN SODIUM AND SULBACTAM SODIUM 3 G: 2; 1 INJECTION, POWDER, FOR SOLUTION INTRAMUSCULAR; INTRAVENOUS at 05:35

## 2017-11-18 RX ADMIN — HYDRALAZINE HYDROCHLORIDE 20 MG: 20 INJECTION INTRAMUSCULAR; INTRAVENOUS at 07:03

## 2017-11-18 RX ADMIN — OXYCODONE HYDROCHLORIDE 10 MG: 10 TABLET ORAL at 21:52

## 2017-11-18 RX ADMIN — IPRATROPIUM BROMIDE AND ALBUTEROL SULFATE 3 ML: .5; 3 SOLUTION RESPIRATORY (INHALATION) at 22:15

## 2017-11-18 RX ADMIN — IPRATROPIUM BROMIDE AND ALBUTEROL SULFATE 3 ML: .5; 3 SOLUTION RESPIRATORY (INHALATION) at 15:00

## 2017-11-18 RX ADMIN — CHLORHEXIDINE GLUCONATE 15 ML: 1.2 RINSE ORAL at 09:00

## 2017-11-18 RX ADMIN — INSULIN HUMAN 2 UNITS: 100 INJECTION, SOLUTION PARENTERAL at 07:03

## 2017-11-18 RX ADMIN — OXYCODONE HYDROCHLORIDE 5 MG: 5 TABLET ORAL at 05:40

## 2017-11-18 RX ADMIN — FAMOTIDINE 20 MG: 10 INJECTION, SOLUTION INTRAVENOUS at 09:00

## 2017-11-18 RX ADMIN — OXYCODONE HYDROCHLORIDE 5 MG: 5 TABLET ORAL at 06:33

## 2017-11-18 RX ADMIN — AZITHROMYCIN FOR INJECTION INJECTION, POWDER, LYOPHILIZED, FOR SOLUTION 500 MG: 500 INJECTION INTRAVENOUS at 09:00

## 2017-11-18 RX ADMIN — METHYLPREDNISOLONE SODIUM SUCCINATE 62.5 MG: 125 INJECTION, POWDER, FOR SOLUTION INTRAMUSCULAR; INTRAVENOUS at 05:35

## 2017-11-18 RX ADMIN — HYDROMORPHONE HYDROCHLORIDE 0.25 MG: 1 INJECTION, SOLUTION INTRAMUSCULAR; INTRAVENOUS; SUBCUTANEOUS at 13:46

## 2017-11-18 RX ADMIN — FENTANYL CITRATE 100 MCG: 50 INJECTION, SOLUTION INTRAMUSCULAR; INTRAVENOUS at 11:06

## 2017-11-18 RX ADMIN — AMPICILLIN SODIUM AND SULBACTAM SODIUM 3 G: 2; 1 INJECTION, POWDER, FOR SOLUTION INTRAMUSCULAR; INTRAVENOUS at 23:33

## 2017-11-18 RX ADMIN — HYDRALAZINE HYDROCHLORIDE 20 MG: 20 INJECTION INTRAMUSCULAR; INTRAVENOUS at 01:00

## 2017-11-18 RX ADMIN — PROPOFOL 80 MCG/KG/MIN: 10 INJECTION, EMULSION INTRAVENOUS at 14:00

## 2017-11-18 RX ADMIN — AMPICILLIN SODIUM AND SULBACTAM SODIUM 3 G: 2; 1 INJECTION, POWDER, FOR SOLUTION INTRAMUSCULAR; INTRAVENOUS at 18:00

## 2017-11-18 RX ADMIN — CARVEDILOL 12.5 MG: 12.5 TABLET, FILM COATED ORAL at 09:00

## 2017-11-18 RX ADMIN — FENTANYL CITRATE 100 MCG: 50 INJECTION, SOLUTION INTRAMUSCULAR; INTRAVENOUS at 07:05

## 2017-11-18 RX ADMIN — LISINOPRIL 20 MG: 20 TABLET ORAL at 20:01

## 2017-11-18 RX ADMIN — HYDRALAZINE HYDROCHLORIDE 20 MG: 20 INJECTION INTRAMUSCULAR; INTRAVENOUS at 13:46

## 2017-11-18 RX ADMIN — IPRATROPIUM BROMIDE AND ALBUTEROL SULFATE 3 ML: .5; 3 SOLUTION RESPIRATORY (INHALATION) at 01:46

## 2017-11-18 RX ADMIN — OXYCODONE HYDROCHLORIDE 10 MG: 10 TABLET ORAL at 14:00

## 2017-11-18 RX ADMIN — PROPOFOL 60 MCG/KG/MIN: 10 INJECTION, EMULSION INTRAVENOUS at 05:43

## 2017-11-18 RX ADMIN — PROPOFOL 45 MCG/KG/MIN: 10 INJECTION, EMULSION INTRAVENOUS at 17:38

## 2017-11-18 RX ADMIN — IPRATROPIUM BROMIDE AND ALBUTEROL SULFATE 3 ML: .5; 3 SOLUTION RESPIRATORY (INHALATION) at 06:12

## 2017-11-18 RX ADMIN — PROPOFOL 80 MCG/KG/MIN: 10 INJECTION, EMULSION INTRAVENOUS at 01:19

## 2017-11-18 RX ADMIN — RIVAROXABAN 20 MG: 20 TABLET, FILM COATED ORAL at 18:00

## 2017-11-18 RX ADMIN — PROPOFOL 45 MCG/KG/MIN: 10 INJECTION, EMULSION INTRAVENOUS at 23:35

## 2017-11-18 RX ADMIN — INSULIN HUMAN 2 UNITS: 100 INJECTION, SOLUTION PARENTERAL at 19:14

## 2017-11-18 RX ADMIN — CHLORHEXIDINE GLUCONATE 15 ML: 1.2 RINSE ORAL at 20:01

## 2017-11-18 RX ADMIN — PROPOFOL 45 MCG/KG/MIN: 10 INJECTION, EMULSION INTRAVENOUS at 20:02

## 2017-11-18 RX ADMIN — CARVEDILOL 12.5 MG: 12.5 TABLET, FILM COATED ORAL at 20:01

## 2017-11-18 RX ADMIN — OXYCODONE HYDROCHLORIDE 10 MG: 10 TABLET ORAL at 08:30

## 2017-11-18 RX ADMIN — IPRATROPIUM BROMIDE AND ALBUTEROL SULFATE 3 ML: .5; 3 SOLUTION RESPIRATORY (INHALATION) at 18:11

## 2017-11-18 RX ADMIN — AMPICILLIN SODIUM AND SULBACTAM SODIUM 3 G: 2; 1 INJECTION, POWDER, FOR SOLUTION INTRAMUSCULAR; INTRAVENOUS at 12:00

## 2017-11-18 RX ADMIN — FENTANYL CITRATE 100 MCG: 50 INJECTION, SOLUTION INTRAMUSCULAR; INTRAVENOUS at 00:04

## 2017-11-18 RX ADMIN — GABAPENTIN 300 MG: 300 CAPSULE ORAL at 20:01

## 2017-11-18 RX ADMIN — STANDARDIZED SENNA CONCENTRATE AND DOCUSATE SODIUM 2 TABLET: 8.6; 5 TABLET, FILM COATED ORAL at 20:01

## 2017-11-18 RX ADMIN — IPRATROPIUM BROMIDE AND ALBUTEROL SULFATE 3 ML: .5; 3 SOLUTION RESPIRATORY (INHALATION) at 10:44

## 2017-11-18 RX ADMIN — METHYLPREDNISOLONE SODIUM SUCCINATE 40 MG: 40 INJECTION, POWDER, FOR SOLUTION INTRAMUSCULAR; INTRAVENOUS at 12:00

## 2017-11-18 ASSESSMENT — PATIENT HEALTH QUESTIONNAIRE - PHQ9
1. LITTLE INTEREST OR PLEASURE IN DOING THINGS: NOT AT ALL
SUM OF ALL RESPONSES TO PHQ QUESTIONS 1-9: 0
2. FEELING DOWN, DEPRESSED, IRRITABLE, OR HOPELESS: NOT AT ALL
SUM OF ALL RESPONSES TO PHQ9 QUESTIONS 1 AND 2: 0

## 2017-11-18 NOTE — PROGRESS NOTES
Patient hypertensive in 180s despite maximum propofol administered. Dr Day paged, new orders received and implemented.

## 2017-11-18 NOTE — CARE PLAN
Problem: Ventilation Defect:  Goal: Ability to achieve and maintain unassisted ventilation or tolerate decreased levels of ventilator support    Intervention: Monitor ventilator weaning response  Adult Ventilation Update    Total Vent Days: 3    APVCMV  22/430/+8/50%    Patient Lines/Drains/Airways Status    Active Airway     Name: Placement date: Placement time: Site: Days:    Airway Group ET Tube Oral 8.0@25 11/15/17   1915   Oral   1                 Rapid Shallow Breathing Index (RR/VT): 62 (11/17/17 1152)  Plateau Pressure (Q Shift): 18 (11/16/17 1948)  Static Compliance (ml / cm H2O): 41 (11/17/17 1451)    Patient failed trials because of Barriers to Wean: Other (Comments) (not ready per Dr. Harper) (11/17/17 0630)  Barriers to SBT Weaning Trial Stopped due to::  (transfer to SICU, tolerated SBT well for 30 mins) (11/17/17 1152)  Length of Weaning Trial Length of Weaning Trial (Hours): 30 minutes (11/17/17 1152)    Cough: Productive (11/17/17 1200)  Sputum Amount: Small (11/17/17 1200)  Sputum Color: Bloody (11/17/17 1200)  Sputum Consistency: Thick (11/17/17 1200)    Mobility Group  Activity Performed: Unable to mobilize (11/17/17 1200)  Pt Calls for Assistance: No (11/17/17 1200)  Reason Not Mobilized: Unstable condition (11/17/17 1200)  Mobilization Comments: FiO2 at 50% (11/17/17 1200)    Events/Summary/Plan: Pt did SBT for 30mins in CICU. No other vent changes made this shift.

## 2017-11-18 NOTE — CARE PLAN
Problem: Pain Management  Goal: Pain level will decrease to patient's comfort goal  Pain assessed and communicated using appropriate pain scale, patient uses pain medication at home for chronic pain. Pharmacologic and non pharmacologic methods used to reduce pain. Goal is comfort at rest.     Problem: Risk for Impaired Mobility--Activity Intolerance  Goal: Mobilize and/or Transfer Safely with Maximum [place]  Patient mobilized as tolerated. Encouraged active range of motion and assisted as needed. Pain management assessed prior to mobilization to allow for adequate mobility progress.     Problem: Mechanical Ventilation  Goal: Safe management of artificial airway and ventilation  Patient assessed for patent airway, suctioned as needed, turned q2hrs and positioned for optimal respiratory status. Ambu bag at bedside. Coordination with RT in place.

## 2017-11-19 ENCOUNTER — APPOINTMENT (OUTPATIENT)
Dept: RADIOLOGY | Facility: MEDICAL CENTER | Age: 77
DRG: 166 | End: 2017-11-19
Attending: INTERNAL MEDICINE
Payer: MEDICARE

## 2017-11-19 LAB
ANION GAP SERPL CALC-SCNC: 8 MMOL/L (ref 0–11.9)
BASE EXCESS BLDA CALC-SCNC: 1 MMOL/L (ref -4–3)
BASOPHILS # BLD AUTO: 0.1 % (ref 0–1.8)
BASOPHILS # BLD: 0.01 K/UL (ref 0–0.12)
BODY TEMPERATURE: ABNORMAL DEGREES
BUN SERPL-MCNC: 43 MG/DL (ref 8–22)
CALCIUM SERPL-MCNC: 8.2 MG/DL (ref 8.5–10.5)
CHLORIDE SERPL-SCNC: 102 MMOL/L (ref 96–112)
CO2 BLDA-SCNC: 27 MMOL/L (ref 20–33)
CO2 SERPL-SCNC: 24 MMOL/L (ref 20–33)
CREAT SERPL-MCNC: 1.28 MG/DL (ref 0.5–1.4)
EOSINOPHIL # BLD AUTO: 0 K/UL (ref 0–0.51)
EOSINOPHIL NFR BLD: 0 % (ref 0–6.9)
ERYTHROCYTE [DISTWIDTH] IN BLOOD BY AUTOMATED COUNT: 48.7 FL (ref 35.9–50)
GFR SERPL CREATININE-BSD FRML MDRD: 55 ML/MIN/1.73 M 2
GLUCOSE BLD-MCNC: 112 MG/DL (ref 65–99)
GLUCOSE BLD-MCNC: 148 MG/DL (ref 65–99)
GLUCOSE BLD-MCNC: 83 MG/DL (ref 65–99)
GLUCOSE SERPL-MCNC: 156 MG/DL (ref 65–99)
HCO3 BLDA-SCNC: 25.6 MMOL/L (ref 17–25)
HCT VFR BLD AUTO: 30.9 % (ref 42–52)
HGB BLD-MCNC: 10 G/DL (ref 14–18)
IMM GRANULOCYTES # BLD AUTO: 0.06 K/UL (ref 0–0.11)
IMM GRANULOCYTES NFR BLD AUTO: 0.9 % (ref 0–0.9)
LYMPHOCYTES # BLD AUTO: 0.95 K/UL (ref 1–4.8)
LYMPHOCYTES NFR BLD: 14.2 % (ref 22–41)
MCH RBC QN AUTO: 28.7 PG (ref 27–33)
MCHC RBC AUTO-ENTMCNC: 32.4 G/DL (ref 33.7–35.3)
MCV RBC AUTO: 88.8 FL (ref 81.4–97.8)
MONOCYTES # BLD AUTO: 0.53 K/UL (ref 0–0.85)
MONOCYTES NFR BLD AUTO: 7.9 % (ref 0–13.4)
NEUTROPHILS # BLD AUTO: 5.12 K/UL (ref 1.82–7.42)
NEUTROPHILS NFR BLD: 76.9 % (ref 44–72)
NRBC # BLD AUTO: 0 K/UL
NRBC BLD AUTO-RTO: 0 /100 WBC
O2/TOTAL GAS SETTING VFR VENT: 50 %
PCO2 BLDA: 39.1 MMHG (ref 26–37)
PCO2 TEMP ADJ BLDA: 38.4 MMHG (ref 26–37)
PH BLDA: 7.42 [PH] (ref 7.4–7.5)
PH TEMP ADJ BLDA: 7.43 [PH] (ref 7.4–7.5)
PLATELET # BLD AUTO: 188 K/UL (ref 164–446)
PMV BLD AUTO: 9.3 FL (ref 9–12.9)
PO2 BLDA: 81 MMHG (ref 64–87)
PO2 TEMP ADJ BLDA: 79 MMHG (ref 64–87)
POTASSIUM SERPL-SCNC: 4.3 MMOL/L (ref 3.6–5.5)
RBC # BLD AUTO: 3.48 M/UL (ref 4.7–6.1)
SAO2 % BLDA: 96 % (ref 93–99)
SODIUM SERPL-SCNC: 134 MMOL/L (ref 135–145)
SPECIMEN DRAWN FROM PATIENT: ABNORMAL
WBC # BLD AUTO: 6.7 K/UL (ref 4.8–10.8)

## 2017-11-19 PROCEDURE — A9270 NON-COVERED ITEM OR SERVICE: HCPCS | Performed by: INTERNAL MEDICINE

## 2017-11-19 PROCEDURE — 700111 HCHG RX REV CODE 636 W/ 250 OVERRIDE (IP): Performed by: INTERNAL MEDICINE

## 2017-11-19 PROCEDURE — A9270 NON-COVERED ITEM OR SERVICE: HCPCS | Performed by: HOSPITALIST

## 2017-11-19 PROCEDURE — 85025 COMPLETE CBC W/AUTO DIFF WBC: CPT

## 2017-11-19 PROCEDURE — 700102 HCHG RX REV CODE 250 W/ 637 OVERRIDE(OP): Performed by: INTERNAL MEDICINE

## 2017-11-19 PROCEDURE — 700101 HCHG RX REV CODE 250: Performed by: INTERNAL MEDICINE

## 2017-11-19 PROCEDURE — 700105 HCHG RX REV CODE 258: Performed by: HOSPITALIST

## 2017-11-19 PROCEDURE — 94640 AIRWAY INHALATION TREATMENT: CPT

## 2017-11-19 PROCEDURE — 82803 BLOOD GASES ANY COMBINATION: CPT

## 2017-11-19 PROCEDURE — 700102 HCHG RX REV CODE 250 W/ 637 OVERRIDE(OP): Performed by: HOSPITALIST

## 2017-11-19 PROCEDURE — 82962 GLUCOSE BLOOD TEST: CPT | Mod: 91

## 2017-11-19 PROCEDURE — 36600 WITHDRAWAL OF ARTERIAL BLOOD: CPT

## 2017-11-19 PROCEDURE — 94150 VITAL CAPACITY TEST: CPT

## 2017-11-19 PROCEDURE — 71010 DX-CHEST-PORTABLE (1 VIEW): CPT

## 2017-11-19 PROCEDURE — 80048 BASIC METABOLIC PNL TOTAL CA: CPT

## 2017-11-19 PROCEDURE — 94003 VENT MGMT INPAT SUBQ DAY: CPT

## 2017-11-19 PROCEDURE — 770020 HCHG ROOM/CARE - TELE (206)

## 2017-11-19 PROCEDURE — 700111 HCHG RX REV CODE 636 W/ 250 OVERRIDE (IP): Performed by: HOSPITALIST

## 2017-11-19 PROCEDURE — 700111 HCHG RX REV CODE 636 W/ 250 OVERRIDE (IP): Performed by: EMERGENCY MEDICINE

## 2017-11-19 RX ORDER — DEXTROSE MONOHYDRATE 25 G/50ML
25 INJECTION, SOLUTION INTRAVENOUS
Status: DISCONTINUED | OUTPATIENT
Start: 2017-11-19 | End: 2017-11-22 | Stop reason: HOSPADM

## 2017-11-19 RX ADMIN — IPRATROPIUM BROMIDE AND ALBUTEROL SULFATE 3 ML: .5; 3 SOLUTION RESPIRATORY (INHALATION) at 07:23

## 2017-11-19 RX ADMIN — RIVAROXABAN 20 MG: 20 TABLET, FILM COATED ORAL at 18:00

## 2017-11-19 RX ADMIN — OXYCODONE HYDROCHLORIDE 10 MG: 10 TABLET ORAL at 05:05

## 2017-11-19 RX ADMIN — AMPICILLIN SODIUM AND SULBACTAM SODIUM 3 G: 2; 1 INJECTION, POWDER, FOR SOLUTION INTRAMUSCULAR; INTRAVENOUS at 05:05

## 2017-11-19 RX ADMIN — IPRATROPIUM BROMIDE AND ALBUTEROL SULFATE 3 ML: .5; 3 SOLUTION RESPIRATORY (INHALATION) at 12:41

## 2017-11-19 RX ADMIN — STANDARDIZED SENNA CONCENTRATE AND DOCUSATE SODIUM 2 TABLET: 8.6; 5 TABLET, FILM COATED ORAL at 21:42

## 2017-11-19 RX ADMIN — AMPICILLIN SODIUM AND SULBACTAM SODIUM 3 G: 2; 1 INJECTION, POWDER, FOR SOLUTION INTRAMUSCULAR; INTRAVENOUS at 23:54

## 2017-11-19 RX ADMIN — CARVEDILOL 12.5 MG: 12.5 TABLET, FILM COATED ORAL at 21:42

## 2017-11-19 RX ADMIN — OXYCODONE HYDROCHLORIDE 10 MG: 10 TABLET ORAL at 21:42

## 2017-11-19 RX ADMIN — OXYCODONE HYDROCHLORIDE 10 MG: 10 TABLET ORAL at 14:29

## 2017-11-19 RX ADMIN — GABAPENTIN 300 MG: 300 CAPSULE ORAL at 21:42

## 2017-11-19 RX ADMIN — LISINOPRIL 20 MG: 20 TABLET ORAL at 21:42

## 2017-11-19 RX ADMIN — PROPOFOL 40 MCG/KG/MIN: 10 INJECTION, EMULSION INTRAVENOUS at 03:15

## 2017-11-19 RX ADMIN — IPRATROPIUM BROMIDE AND ALBUTEROL SULFATE 3 ML: .5; 3 SOLUTION RESPIRATORY (INHALATION) at 20:18

## 2017-11-19 RX ADMIN — OXYCODONE HYDROCHLORIDE 5 MG: 5 TABLET ORAL at 12:32

## 2017-11-19 RX ADMIN — CARVEDILOL 12.5 MG: 12.5 TABLET, FILM COATED ORAL at 09:03

## 2017-11-19 RX ADMIN — OXYCODONE HYDROCHLORIDE 5 MG: 5 TABLET ORAL at 18:11

## 2017-11-19 RX ADMIN — AMPICILLIN SODIUM AND SULBACTAM SODIUM 3 G: 2; 1 INJECTION, POWDER, FOR SOLUTION INTRAMUSCULAR; INTRAVENOUS at 18:00

## 2017-11-19 RX ADMIN — ATORVASTATIN CALCIUM 20 MG: 20 TABLET, FILM COATED ORAL at 09:03

## 2017-11-19 RX ADMIN — FAMOTIDINE 20 MG: 10 INJECTION, SOLUTION INTRAVENOUS at 09:03

## 2017-11-19 RX ADMIN — IPRATROPIUM BROMIDE AND ALBUTEROL SULFATE 3 ML: .5; 3 SOLUTION RESPIRATORY (INHALATION) at 02:35

## 2017-11-19 RX ADMIN — AMPICILLIN SODIUM AND SULBACTAM SODIUM 3 G: 2; 1 INJECTION, POWDER, FOR SOLUTION INTRAMUSCULAR; INTRAVENOUS at 12:32

## 2017-11-19 RX ADMIN — IPRATROPIUM BROMIDE AND ALBUTEROL SULFATE 3 ML: .5; 3 SOLUTION RESPIRATORY (INHALATION) at 23:01

## 2017-11-19 RX ADMIN — IPRATROPIUM BROMIDE AND ALBUTEROL SULFATE 3 ML: .5; 3 SOLUTION RESPIRATORY (INHALATION) at 14:53

## 2017-11-19 RX ADMIN — FUROSEMIDE 20 MG: 10 INJECTION, SOLUTION INTRAMUSCULAR; INTRAVENOUS at 09:03

## 2017-11-19 ASSESSMENT — PAIN SCALES - GENERAL
PAINLEVEL_OUTOF10: 5
PAINLEVEL_OUTOF10: 4
PAINLEVEL_OUTOF10: 6
PAINLEVEL_OUTOF10: 7
PAINLEVEL_OUTOF10: 5
PAINLEVEL_OUTOF10: 6
PAINLEVEL_OUTOF10: 7
PAINLEVEL_OUTOF10: 4
PAINLEVEL_OUTOF10: 6
PAINLEVEL_OUTOF10: 5
PAINLEVEL_OUTOF10: 6

## 2017-11-19 ASSESSMENT — PATIENT HEALTH QUESTIONNAIRE - PHQ9
SUM OF ALL RESPONSES TO PHQ9 QUESTIONS 1 AND 2: 0
1. LITTLE INTEREST OR PLEASURE IN DOING THINGS: NOT AT ALL
2. FEELING DOWN, DEPRESSED, IRRITABLE, OR HOPELESS: NOT AT ALL
SUM OF ALL RESPONSES TO PHQ QUESTIONS 1-9: 0

## 2017-11-19 ASSESSMENT — COPD QUESTIONNAIRES
COPD SCREENING SCORE: 7
DO YOU EVER COUGH UP ANY MUCUS OR PHLEGM?: YES, A FEW DAYS A WEEK OR MONTH
DURING THE PAST 4 WEEKS HOW MUCH DID YOU FEEL SHORT OF BREATH: SOME OF THE TIME
DO YOU EVER COUGH UP ANY MUCUS OR PHLEGM?: YES, EVERY DAY
HAVE YOU SMOKED AT LEAST 100 CIGARETTES IN YOUR ENTIRE LIFE: NO/DON'T KNOW
HAVE YOU SMOKED AT LEAST 100 CIGARETTES IN YOUR ENTIRE LIFE: YES
COPD SCREENING SCORE: 8
DURING THE PAST 4 WEEKS HOW MUCH DID YOU FEEL SHORT OF BREATH: MOST  OR ALL OF THE TIME

## 2017-11-19 ASSESSMENT — PULMONARY FUNCTION TESTS: FVC: 1.1

## 2017-11-19 ASSESSMENT — LIFESTYLE VARIABLES: EVER_SMOKED: YES

## 2017-11-19 NOTE — RESPIRATORY CARE
Extubation    Cuff leak noted yes  Stridor present no        O2 (LPM): 3 (11/15/17 1800)     Patient toleration pt tolerating well  RCP Complete? Yes   Events/Summary/Plan: Pt SBT for 1 hour. Parameters WNL. Verbal order given by Diking to extubate. Pt extubated to 4 lpm Oxymask, pt tolerating well, no signs of distress at this time.(11/19/17 0712)

## 2017-11-19 NOTE — PROGRESS NOTES
"Pulmonary Critical Care Progress Note        Chief Complaint: Level consciousness with hypoxemia    History of Present Illness: 77 y.o. Male, O2 dep COPD 4 lpm, chronic leg wounds, h/o PE on rivaroxaban; found down in chair at home, min responsive, hypoxic and \"twitching.\"  Intubated in ED    Please note the history obtained by my partner Dr. Judson Harper.      ROS:  Respiratory: unable to perform due to the patient's inability to effectively communicate, Cardiac: unable to perform due to the patient's inability to effectively communicate, GI: unable to perform due to the patient's inability to effectively communicate.  All other systems negative.    Interval Events:  24 hour interval history reviewed    -Overnight the patient remained on only modest FiO2 requirements.     -Patient remained on fentanyl infusion.     -Patient has been off of vasopressor support.    -Remains on antibiotics for presumed pneumonia.    -Overall appears to be appropriate for trial of extubation today, however patient is admitted to do not resuscitate to not reintubate order.    PFSH:  No change.    Respiratory:  Seaman Vent Mode: APVCMV, Rate (breaths/min): 22, Vt Target (mL): 430, PEEP/CPAP: 8, FiO2: 50, Static Compliance (ml / cm H2O): 40.4, Weaning Trial Stopped due to:: RSBI >105 (Rapid Shallow Breathing Index);Abnormal breathing pattern (paradoxical respiratory pattern, use of accessory muscles, etc), Length of Weaning Trial (Hours): 5 mins, Control VTE (exp VT): 430  Pulse Oximetry: 97 %  Chest Tube Drains:          Exam: rhonchi bibasilar  ImagingNone - Reviewed and CXR  I have personally reviewed the chest x-ray my impression is : Patient is ready since her catheter in good position. There is cardiomegaly. Interstitial infiltrates appear improved.  Recent Labs      11/16/17   0451  11/17/17   0422  11/18/17   0411   ISTATAPH  7.361*  7.434  7.419   ISTATAPCO2  48.2*  36.3  38.3*   ISTATAPO2  200*  70  71   ISTATATCO2  29  25  26 "   [id number]  100*  94  94   ISTATARTHCO3  27.3*  24.3  24.8   ISTATARTBE  1  0  0   ISTATTEMP  35.9 C  36.7 C  97.7 F   ISTATFIO2  100  50  50   ISTATSPEC  Arterial  Arterial  Arterial   ISTATAPHTC  7.377*  7.438  7.427   [id number]  195*  68  69       HemoDynamics:  Pulse: 89, Heart Rate (Monitored): 86  NIBP: 119/54  CVP (mm Hg): 2 MM HG    Exam: bundle branch block. Patient has had rate regular without obvious murmur rub or gallop. PMI is laterally displaced. The patient remains on antihypertensive therapy.  Imaging: echo Reviewed:      CONCLUSIONS  No prior study is available for comparison.   Left ventricular ejection fraction is visually estimated to be 60%.   Normal regional wall motion.   Diastolic dysfunction is indeterminate.  No evidence of valvular abnormality based on Doppler evaluation.   Estimated right ventricular systolic pressure is 16 mmHg. Right atrial   pressure is estimated to be 15 mmHg.       Neuro:  GCS Total Damon Coma Score: 11       Exam: Heavily sedated, however once the patient's sedation was held by nursing staff today patient was known to be alert but then became somewhat delirious. He was moving all 4 limbs. He was somewhat agitated according to nursing staff. There was no focal deficits on my exam.  Imaging: None - Reviewed    Fluids:  Intake/Output       11/16/17 0700 - 11/17/17 0659 11/17/17 0700 - 11/18/17 0659 11/18/17 0700 - 11/19/17 0659      3967-0123 5983-1508 Total 4785-0321 7120-7824 Total 1518-1763 9409-3517 Total       Intake    P.O.  --  0 0  --  -- --  --  -- --    P.O. -- 0 0 -- -- -- -- -- --    I.V.  1489.1  1630.4 3119.5  836.5  1644 2480.4  813.3  -- 813.3    Magnesium Sulfate Volume -- 50 50 -- -- -- -- -- --    Vasopressin Volume -- 0 0 -- -- -- -- -- --    Propofol Volume 389.1 469.4 858.5 436.5 544 980.4 363.3 -- 363.3    Norepinephrine Volume -- 11 11 -- -- -- -- -- --    IV Piggyback Volume (IV Piggyback) 200 200 400 100 200 300 450 -- 450    IV Volume  (NS)   -- -- --    Other  --  -- --  30  -- 30  --  -- --    Medications (P.O./ Enteral Liquids) -- -- -- 30 -- 30 -- -- --    Enteral  --  385 385  230  680 910  880  -- 880    Enteral Volume -- 205 205 150 500 650 880 -- 880    Free Water / Tube Flush -- 180 180 80 180 260 -- -- --    Total Intake 1489.1 2015.4 3504.5 1096.5 2324 3420.4 1693.3 -- 1693.3       Output    Urine  825  635 1460  645  650 1295  400  -- 400    Indwelling Cathether -- -- --  400 -- 400    Void (ml)  295 -- 295 -- -- --    Drains  --  0 0  --  -- --  --  -- --    Residual Amount (ml) (Discarded) -- 0 0 -- -- -- -- -- --    Stool  --  -- --  --  -- --  --  -- --    Number of Times Stooled -- 0 x 0 x 0 x -- 0 x -- -- --    Total Output   400 -- 400       Net I/O     664.1 1380.4 2044.5 451.5 1674 2125.4 1293.3 -- 1293.3        Weight: 118.7 kg (261 lb 11 oz)  Recent Labs      17   0210   17   0340  17   0535   SODIUM  132*  128*   < >  132*  131*  130*   POTASSIUM  4.5   --    --   3.9  3.9   CHLORIDE  97   --    --   99  100   CO2  26   --    --   24  26   BUN  18   --    --   20  28*   CREATININE  1.70*   --    --   1.29  1.16   MAGNESIUM  1.8   --    --   2.3  2.2   PHOSPHORUS  3.4   --    --   2.5  3.1   CALCIUM  8.2*   --    --   8.7  8.4*    < > = values in this interval not displayed.       GI/Nutrition:  Exam: abdomen is soft and non-tender, normal active bowel sounds  Imaging: None - Reviewed  tube feed At goal  Liver Function  Recent Labs      170  17   0535   GLUCOSE  136*  167*  191*       Heme:  Recent Labs      17   0535   RBC  3.67*  3.52*  3.44*   HEMOGLOBIN  10.4*  10.2*  10.2*   HEMATOCRIT  33.9*  30.8*  30.7*   PLATELETCT  216  188  209       Infectious Disease:  Monitored Temp  Av.2 °C (98.9 °F)  Min: 36.3 °C (97.3 °F)  Max: 37.8 °C (100  °F)  Micro: antibiotics reviewed: Left lower lobe BAL revealing gwen but no obvious identified organism. Patient remains on Zosyn day 3 of 5 and azithromycin day 5 of 5.  Recent Labs      11/16/17   0210  11/17/17   0340  11/18/17   0535   WBC  9.0  7.4  6.4   NEUTSPOLYS  71.00  91.20*  87.80*   LYMPHOCYTES  17.40*  6.20*  7.70*   MONOCYTES  8.20  2.00  3.50   EOSINOPHILS  2.70  0.00  0.00   BASOPHILS  0.30  0.10  0.20     Current Facility-Administered Medications   Medication Dose Frequency Provider Last Rate Last Dose   • hydrALAZINE (APRESOLINE) injection 10-20 mg  10-20 mg Q4HRS PRN Aaron Day M.D.   20 mg at 11/18/17 1346   • oxycodone immediate release (ROXICODONE) tablet 10 mg  10 mg Q8HRS Bernard Esparza D.O.   10 mg at 11/18/17 1400   • [START ON 11/19/2017] furosemide (LASIX) injection 20 mg  20 mg Q DAY Bernard Esparza, D.O.       • insulin regular (HUMULIN R) injection 2-9 Units  2-9 Units Q6HRS Vivek Villalobos M.D.   2 Units at 11/18/17 1914    And   • glucose 4 g chewable tablet 16 g  16 g Q15 MIN PRN Vivek Villalobos M.D.        And   • dextrose 50% (D50W) injection 25 mL  25 mL Q15 MIN PRN Vivek Villalobos M.D.       • carvedilol (COREG) tablet 12.5 mg  12.5 mg BID Aaron Day M.D.   12.5 mg at 11/18/17 2001   • lisinopril (PRINIVIL) tablet 20 mg  20 mg Nightly Aaron Day M.D.   20 mg at 11/18/17 2001   • rivaroxaban (XARELTO) tablet 20 mg  20 mg PM MEAL Gary Lacey M.D.   20 mg at 11/18/17 1800   • propofol (DIPRIVAN) injection  0-80 mcg/kg/min Continuous Deven Zepeda M.D. 30 mL/hr at 11/18/17 2002 45 mcg/kg/min at 11/18/17 2002   • atorvastatin (LIPITOR) tablet 20 mg  20 mg DAILY Vivek Villalobos M.D.   Stopped at 11/18/17 0900   • gabapentin (NEURONTIN) capsule 300 mg  300 mg QHS Vivek Villalobos M.D.   300 mg at 11/18/17 2001   • senna-docusate (PERICOLACE or SENOKOT S) 8.6-50 MG per tablet 2 Tab  2 Tab BID Vivek Villalobos M.D.    2 Tab at 11/18/17 2001    And   • polyethylene glycol/lytes (MIRALAX) PACKET 1 Packet  1 Packet QDAY PRN Vivek Villalobos M.D.        And   • magnesium hydroxide (MILK OF MAGNESIA) suspension 30 mL  30 mL QDAY PRN Vivek Villalobos M.D.        And   • bisacodyl (DULCOLAX) suppository 10 mg  10 mg QDAY PRN Vivek Villalobos M.D.       • acetaminophen (TYLENOL) tablet 650 mg  650 mg Q6HRS PRN Vivek Villalobos M.D.       • Pharmacy Consult Request ...Pain Management Review   PRN Vivek Villalobos M.D.        And   • oxycodone immediate-release (ROXICODONE) tablet 2.5 mg  2.5 mg Q3HRS PRN Vivek Villalobos M.D.        And   • oxycodone immediate-release (ROXICODONE) tablet 5 mg  5 mg Q3HRS PRN Vivek Villalobos M.D.   5 mg at 11/18/17 0633    And   • HYDROmorphone (DILAUDID) injection 0.25 mg  0.25 mg Q3HRS PRN Vivek Villalobos M.D.   0.25 mg at 11/18/17 1346   • ondansetron (ZOFRAN) syringe/vial injection 4 mg  4 mg Q4HRS PRN Vivek Villalobos M.D.       • ondansetron (ZOFRAN ODT) dispertab 4 mg  4 mg Q4HRS PRN Vivek Villalobos M.D.       • ampicillin/sulbactam (UNASYN) 3 g in  mL IVPB  3 g Q6HRS Gary Lacey M.D.   Stopped at 11/18/17 1830   • Respiratory Care per Protocol   Continuous RT Aaron Day M.D.       • chlorhexidine (PERIDEX) 0.12 % solution 15 mL  15 mL BID Aaron Day M.D.   15 mL at 11/18/17 2001   • lidocaine (XYLOCAINE) 1%  injection  1-2 mL Q30 MIN PRN Aaron Day M.D.       • MD ALERT...Adult ICU Electrolyte Replacement per Pharmacy Protocol   pharmacy to dose Aaron Day M.D.       • fentaNYL (SUBLIMAZE) injection 25 mcg  25 mcg Q HOUR PRN Aaron Day M.D.        Or   • fentaNYL (SUBLIMAZE) injection 50 mcg  50 mcg Q HOUR PRN Aaron Day M.D.        Or   • fentaNYL (SUBLIMAZE) injection 100 mcg  100 mcg Q HOUR PRN Aaron Day M.D.   100 mcg at 11/18/17 1106   • ipratropium-albuterol  (DUONEB) nebulizer solution 3 mL  3 mL Q2HRS PRN (RT) Aaron aDy M.D.       • ipratropium-albuterol (DUONEB) nebulizer solution 3 mL  3 mL Q4HRS (RT) Aaron Day M.D.   3 mL at 11/18/17 1811   • famotidine (PEPCID) tablet 20 mg  20 mg DAILY Aaron Day M.D.   20 mg at 11/17/17 0849    Or   • famotidine (PEPCID) injection 20 mg  20 mg DAILY Aaron Day M.D.   20 mg at 11/18/17 0900   • NS infusion 3,333 mL  30 mL/kg Once PRN Aaron Day M.D.   Stopped at 11/15/17 1815   • NS (BOLUS) infusion 500 mL  500 mL Once PRN Aaron Day M.D.         Last reviewed on 11/15/2017  7:32 PM by Davian Naqvi    Quality  Measures:  Labs reviewed, Medications reviewed and Radiology images reviewed  Muniz catheter: No Muniz  Central line in place: Sepsis, Shock and Vasopressors      DVT prophylaxis - mechanical: SCDs and Foot Pumps  Ulcer prophylaxis: Yes      Assessment and plan:    1. Acute on chronic hypercapnic and hypoxemic respiratory failure patient required intubation on 11/15/2017.    Patient remains on Zosyn and azithromycin pending culture data from bronchoscopy.    Patient today had improvement on chest x-ray, and has responded to diuresis. We decreased his IV fluids and appears to have adequate arterial blood gas parameters and is tolerating pressure support and CPAP. Limitation may be his mental status off sedation. We have considered extubation today and order have been written. However after consideration off of sedation. We have decided to keep him intubated and await overnight events.    2. Coronary artery disease, with previous ischemic cardiomyopathy.. Patient has been hypertensive overnight and required hydralazine, lisinopril and Coreg. I have also added additional diuresis. This is in preparation for extubation.    3. Probable acute community acquired pneumonia. Possible aspiration as well.    4. Anemia. Patient has no need for  transfusion at this time.    5. Lower extremity cellulitis in the past with [facility]. Patient was on chronic clindamycin.    6. History of pulmonary embolism. Patient is on Zaroxolyn. At this time no active bleeding and will utilize this as DVT prophylaxis as well.    Comment: does have a do not resuscitate status including no shock CPR or ACLS. Patient code status to be clarified regarding reintubation. Family at this time is accepting BiPAP if necessary however we'll hold off on changing code status to do not intubate as they want to reevaluate. Hope to discuss this further tomorrow.    Discussed patient condition and risk of morbidity and/or mortality with multidisciplinary rounds.   The patient remains critically ill.  Critical care time = 45 minutes in directly providing and coordinating critical care and extensive data review.  No time overlap and excludes procedures.    Bernard Esparza D.O.

## 2017-11-19 NOTE — CARE PLAN
Adult Ventilation Update    Total Vent Days: 5    Patient Lines/Drains/Airways Status    Active Airway     Name: Placement date: Placement time: Site: Days:    Airway Group ET Tube Oral 8.0 11/15/17   1915   Oral   5                 In the last 24 hours, the patient tolerated SBT for 1 hr on 5/8       NIF (cm H2O) :  (will not follow commands) (11/18/17 0736)  Rapid Shallow Breathing Index (RR/VT): 68 (11/18/17 0736)  Plateau Pressure (Q Shift): 20 (11/17/17 1811)  Static Compliance (ml / cm H2O): 31.6 (11/19/17 0237)    Patient failed trials because of Barriers to Wean: Other (Comments) (not ready per Dr. Harper) (11/17/17 0630)  Barriers to SBT Weaning Trial Stopped due to:: RSBI >105 (Rapid Shallow Breathing Index);Abnormal breathing pattern (paradoxical respiratory pattern, use of accessory muscles, etc) (11/18/17 1544)  Length of Weaning Trial Length of Weaning Trial (Hours): 5 mins (11/18/17 1544)        Sputum/Suction   Cough: Productive;Strong (11/19/17 0400)  Sputum Amount: Scant (11/19/17 0400)  Sputum Color: Clear;White (11/19/17 0400)  Sputum Consistency: Thick;Thin (11/19/17 0400)    Mobility Group  Activity Performed: Unable to mobilize (11/18/17 0800)  Pt Calls for Assistance: No (11/18/17 2000)  Reason Not Mobilized: Unstable condition (11/18/17 0600)  Mobilization Comments: FiO2 at 50% (11/17/17 1200)    Events/Summary/Plan: attempted sbt, increased RR. returned to cmv (11/18/17 1544)

## 2017-11-19 NOTE — CARE PLAN
Problem: Risk for Impaired Mobility--Activity Intolerance  Goal: Mobilize and/or Transfer Safely with Maximum Fleming  Patient mobilized as tolerated. Encouraged active range of motion and assisted as needed. Pain management assessed prior to mobilization to allow for adequate mobility progress.     Problem: Pain  Goal: Alleviation of Pain or a reduction in pain to the patient's comfort goal  Pain assessed and communicated using appropriate pain scale. Pharmacologic and non pharmacologic methods used to reduce pain. Coordinating with MDs for adequate pain control, scheduled oxy started.

## 2017-11-19 NOTE — CARE PLAN
Problem: Ventilation Defect:  Goal: Ability to achieve and maintain unassisted ventilation or tolerate decreased levels of ventilator support    Intervention: Support and monitor invasive and noninvasive mechanical ventilation  Adult Ventilation Update  Vent day:4    Patient Lines/Drains/Airways Status    Active Airway     Name: Placement date: Placement time: Site: Days:    Airway Group ET Tube Oral 8.0 11/15/17   1915   Oral   2              In the last 24 hours, the patient tolerated SBT for 1 hr on 5/8       NIF (cm H2O) :  (will not follow commands) (11/18/17 0736)      Cough: Productive;Strong (11/18/17 1544)  Sputum Amount: Small (11/18/17 1544)  Sputum Color: Clear;White (11/18/17 1544)  Sputum Consistency: Thick;Thin (11/18/17 1544)    Mobility Group  Activity Performed: Unable to mobilize (11/18/17 0600)      Events/Summary/Plan: attempted sbt, increased RR. returned to cmv (11/18/17 1544)

## 2017-11-20 ENCOUNTER — APPOINTMENT (OUTPATIENT)
Dept: RADIOLOGY | Facility: MEDICAL CENTER | Age: 77
DRG: 166 | End: 2017-11-20
Attending: INTERNAL MEDICINE
Payer: MEDICARE

## 2017-11-20 LAB
ALBUMIN SERPL BCP-MCNC: 3.1 G/DL (ref 3.2–4.9)
ALBUMIN/GLOB SERPL: 1.1 G/DL
ALP SERPL-CCNC: 50 U/L (ref 30–99)
ALT SERPL-CCNC: 8 U/L (ref 2–50)
ANION GAP SERPL CALC-SCNC: 8 MMOL/L (ref 0–11.9)
AST SERPL-CCNC: 12 U/L (ref 12–45)
BACTERIA BLD CULT: NORMAL
BACTERIA BLD CULT: NORMAL
BASOPHILS # BLD AUTO: 0.2 % (ref 0–1.8)
BASOPHILS # BLD: 0.02 K/UL (ref 0–0.12)
BILIRUB SERPL-MCNC: 0.5 MG/DL (ref 0.1–1.5)
BUN SERPL-MCNC: 42 MG/DL (ref 8–22)
CALCIUM SERPL-MCNC: 8.7 MG/DL (ref 8.5–10.5)
CHLORIDE SERPL-SCNC: 102 MMOL/L (ref 96–112)
CO2 SERPL-SCNC: 27 MMOL/L (ref 20–33)
CREAT SERPL-MCNC: 1.26 MG/DL (ref 0.5–1.4)
EOSINOPHIL # BLD AUTO: 0.08 K/UL (ref 0–0.51)
EOSINOPHIL NFR BLD: 0.7 % (ref 0–6.9)
ERYTHROCYTE [DISTWIDTH] IN BLOOD BY AUTOMATED COUNT: 48.6 FL (ref 35.9–50)
GFR SERPL CREATININE-BSD FRML MDRD: 56 ML/MIN/1.73 M 2
GLOBULIN SER CALC-MCNC: 2.7 G/DL (ref 1.9–3.5)
GLUCOSE BLD-MCNC: 85 MG/DL (ref 65–99)
GLUCOSE SERPL-MCNC: 81 MG/DL (ref 65–99)
HCT VFR BLD AUTO: 34.9 % (ref 42–52)
HGB BLD-MCNC: 10.9 G/DL (ref 14–18)
IMM GRANULOCYTES # BLD AUTO: 0.05 K/UL (ref 0–0.11)
IMM GRANULOCYTES NFR BLD AUTO: 0.5 % (ref 0–0.9)
LYMPHOCYTES # BLD AUTO: 2.4 K/UL (ref 1–4.8)
LYMPHOCYTES NFR BLD: 21.9 % (ref 22–41)
MCH RBC QN AUTO: 28.2 PG (ref 27–33)
MCHC RBC AUTO-ENTMCNC: 31.2 G/DL (ref 33.7–35.3)
MCV RBC AUTO: 90.2 FL (ref 81.4–97.8)
MONOCYTES # BLD AUTO: 1.07 K/UL (ref 0–0.85)
MONOCYTES NFR BLD AUTO: 9.8 % (ref 0–13.4)
NEUTROPHILS # BLD AUTO: 7.34 K/UL (ref 1.82–7.42)
NEUTROPHILS NFR BLD: 66.9 % (ref 44–72)
NRBC # BLD AUTO: 0 K/UL
NRBC BLD AUTO-RTO: 0 /100 WBC
PLATELET # BLD AUTO: 223 K/UL (ref 164–446)
PMV BLD AUTO: 9.6 FL (ref 9–12.9)
POTASSIUM SERPL-SCNC: 4 MMOL/L (ref 3.6–5.5)
PROT SERPL-MCNC: 5.8 G/DL (ref 6–8.2)
RBC # BLD AUTO: 3.87 M/UL (ref 4.7–6.1)
SIGNIFICANT IND 70042: NORMAL
SIGNIFICANT IND 70042: NORMAL
SITE SITE: NORMAL
SITE SITE: NORMAL
SODIUM SERPL-SCNC: 137 MMOL/L (ref 135–145)
SOURCE SOURCE: NORMAL
SOURCE SOURCE: NORMAL
WBC # BLD AUTO: 11 K/UL (ref 4.8–10.8)

## 2017-11-20 PROCEDURE — G8978 MOBILITY CURRENT STATUS: HCPCS | Mod: CM

## 2017-11-20 PROCEDURE — 700102 HCHG RX REV CODE 250 W/ 637 OVERRIDE(OP): Performed by: INTERNAL MEDICINE

## 2017-11-20 PROCEDURE — A9270 NON-COVERED ITEM OR SERVICE: HCPCS | Performed by: HOSPITALIST

## 2017-11-20 PROCEDURE — 82962 GLUCOSE BLOOD TEST: CPT

## 2017-11-20 PROCEDURE — 90670 PCV13 VACCINE IM: CPT | Performed by: INTERNAL MEDICINE

## 2017-11-20 PROCEDURE — 700102 HCHG RX REV CODE 250 W/ 637 OVERRIDE(OP): Performed by: HOSPITALIST

## 2017-11-20 PROCEDURE — A9270 NON-COVERED ITEM OR SERVICE: HCPCS | Performed by: INTERNAL MEDICINE

## 2017-11-20 PROCEDURE — 85025 COMPLETE CBC W/AUTO DIFF WBC: CPT

## 2017-11-20 PROCEDURE — 99232 SBSQ HOSP IP/OBS MODERATE 35: CPT | Performed by: HOSPITALIST

## 2017-11-20 PROCEDURE — 51798 US URINE CAPACITY MEASURE: CPT

## 2017-11-20 PROCEDURE — 700111 HCHG RX REV CODE 636 W/ 250 OVERRIDE (IP): Performed by: INTERNAL MEDICINE

## 2017-11-20 PROCEDURE — 90471 IMMUNIZATION ADMIN: CPT

## 2017-11-20 PROCEDURE — G8997 SWALLOW GOAL STATUS: HCPCS | Mod: CH

## 2017-11-20 PROCEDURE — 92610 EVALUATE SWALLOWING FUNCTION: CPT

## 2017-11-20 PROCEDURE — 71010 DX-CHEST-PORTABLE (1 VIEW): CPT

## 2017-11-20 PROCEDURE — G8996 SWALLOW CURRENT STATUS: HCPCS | Mod: CJ

## 2017-11-20 PROCEDURE — 93010 ELECTROCARDIOGRAM REPORT: CPT | Performed by: INTERNAL MEDICINE

## 2017-11-20 PROCEDURE — 36415 COLL VENOUS BLD VENIPUNCTURE: CPT

## 2017-11-20 PROCEDURE — 97162 PT EVAL MOD COMPLEX 30 MIN: CPT

## 2017-11-20 PROCEDURE — 97166 OT EVAL MOD COMPLEX 45 MIN: CPT

## 2017-11-20 PROCEDURE — 700111 HCHG RX REV CODE 636 W/ 250 OVERRIDE (IP): Performed by: HOSPITALIST

## 2017-11-20 PROCEDURE — G8987 SELF CARE CURRENT STATUS: HCPCS | Mod: CL

## 2017-11-20 PROCEDURE — G8979 MOBILITY GOAL STATUS: HCPCS | Mod: CJ

## 2017-11-20 PROCEDURE — 94640 AIRWAY INHALATION TREATMENT: CPT

## 2017-11-20 PROCEDURE — 93005 ELECTROCARDIOGRAM TRACING: CPT | Performed by: HOSPITALIST

## 2017-11-20 PROCEDURE — 80053 COMPREHEN METABOLIC PANEL: CPT

## 2017-11-20 PROCEDURE — 700101 HCHG RX REV CODE 250: Performed by: INTERNAL MEDICINE

## 2017-11-20 PROCEDURE — 700105 HCHG RX REV CODE 258: Performed by: HOSPITALIST

## 2017-11-20 PROCEDURE — 770020 HCHG ROOM/CARE - TELE (206)

## 2017-11-20 PROCEDURE — G8988 SELF CARE GOAL STATUS: HCPCS | Mod: CJ

## 2017-11-20 RX ORDER — IPRATROPIUM BROMIDE AND ALBUTEROL SULFATE 2.5; .5 MG/3ML; MG/3ML
3 SOLUTION RESPIRATORY (INHALATION)
Status: DISCONTINUED | OUTPATIENT
Start: 2017-11-20 | End: 2017-11-21

## 2017-11-20 RX ORDER — MORPHINE SULFATE 10 MG/ML
5 INJECTION, SOLUTION INTRAMUSCULAR; INTRAVENOUS EVERY 4 HOURS PRN
Status: DISCONTINUED | OUTPATIENT
Start: 2017-11-20 | End: 2017-11-22 | Stop reason: HOSPADM

## 2017-11-20 RX ORDER — METOPROLOL TARTRATE 50 MG/1
50 TABLET, FILM COATED ORAL TWICE DAILY
Status: DISCONTINUED | OUTPATIENT
Start: 2017-11-20 | End: 2017-11-22 | Stop reason: HOSPADM

## 2017-11-20 RX ORDER — TAMSULOSIN HYDROCHLORIDE 0.4 MG/1
0.4 CAPSULE ORAL
Status: DISCONTINUED | OUTPATIENT
Start: 2017-11-20 | End: 2017-11-22 | Stop reason: HOSPADM

## 2017-11-20 RX ADMIN — STANDARDIZED SENNA CONCENTRATE AND DOCUSATE SODIUM 2 TABLET: 8.6; 5 TABLET, FILM COATED ORAL at 10:54

## 2017-11-20 RX ADMIN — OXYCODONE HYDROCHLORIDE 10 MG: 10 TABLET ORAL at 21:39

## 2017-11-20 RX ADMIN — LISINOPRIL 20 MG: 20 TABLET ORAL at 20:41

## 2017-11-20 RX ADMIN — IPRATROPIUM BROMIDE AND ALBUTEROL SULFATE 3 ML: .5; 3 SOLUTION RESPIRATORY (INHALATION) at 02:33

## 2017-11-20 RX ADMIN — OXYCODONE HYDROCHLORIDE 10 MG: 10 TABLET ORAL at 12:16

## 2017-11-20 RX ADMIN — AMPICILLIN SODIUM AND SULBACTAM SODIUM 3 G: 2; 1 INJECTION, POWDER, FOR SOLUTION INTRAMUSCULAR; INTRAVENOUS at 17:07

## 2017-11-20 RX ADMIN — IPRATROPIUM BROMIDE AND ALBUTEROL SULFATE 3 ML: .5; 3 SOLUTION RESPIRATORY (INHALATION) at 14:51

## 2017-11-20 RX ADMIN — ATORVASTATIN CALCIUM 20 MG: 20 TABLET, FILM COATED ORAL at 10:54

## 2017-11-20 RX ADMIN — IPRATROPIUM BROMIDE AND ALBUTEROL SULFATE 3 ML: .5; 3 SOLUTION RESPIRATORY (INHALATION) at 06:36

## 2017-11-20 RX ADMIN — MORPHINE SULFATE 5 MG: 10 INJECTION INTRAVENOUS at 01:36

## 2017-11-20 RX ADMIN — METOPROLOL TARTRATE 50 MG: 50 TABLET, FILM COATED ORAL at 21:39

## 2017-11-20 RX ADMIN — GABAPENTIN 300 MG: 300 CAPSULE ORAL at 20:41

## 2017-11-20 RX ADMIN — MORPHINE SULFATE 5 MG: 10 INJECTION INTRAVENOUS at 10:55

## 2017-11-20 RX ADMIN — CARVEDILOL 12.5 MG: 12.5 TABLET, FILM COATED ORAL at 20:41

## 2017-11-20 RX ADMIN — RIVAROXABAN 20 MG: 20 TABLET, FILM COATED ORAL at 17:00

## 2017-11-20 RX ADMIN — CARVEDILOL 12.5 MG: 12.5 TABLET, FILM COATED ORAL at 10:54

## 2017-11-20 RX ADMIN — HYDRALAZINE HYDROCHLORIDE 10 MG: 20 INJECTION INTRAMUSCULAR; INTRAVENOUS at 00:04

## 2017-11-20 RX ADMIN — AMPICILLIN SODIUM AND SULBACTAM SODIUM 3 G: 2; 1 INJECTION, POWDER, FOR SOLUTION INTRAMUSCULAR; INTRAVENOUS at 12:16

## 2017-11-20 RX ADMIN — BISACODYL 10 MG: 10 SUPPOSITORY RECTAL at 10:44

## 2017-11-20 RX ADMIN — FUROSEMIDE 20 MG: 10 INJECTION, SOLUTION INTRAMUSCULAR; INTRAVENOUS at 10:54

## 2017-11-20 RX ADMIN — TAMSULOSIN HYDROCHLORIDE 0.4 MG: 0.4 CAPSULE ORAL at 17:00

## 2017-11-20 RX ADMIN — IPRATROPIUM BROMIDE AND ALBUTEROL SULFATE 3 ML: .5; 3 SOLUTION RESPIRATORY (INHALATION) at 18:57

## 2017-11-20 RX ADMIN — FAMOTIDINE 20 MG: 10 INJECTION, SOLUTION INTRAVENOUS at 10:54

## 2017-11-20 RX ADMIN — PNEUMOCOCCAL 13-VALENT CONJUGATE VACCINE 0.5 ML: 2.2; 2.2; 2.2; 2.2; 2.2; 4.4; 2.2; 2.2; 2.2; 2.2; 2.2; 2.2; 2.2 INJECTION, SUSPENSION INTRAMUSCULAR at 01:42

## 2017-11-20 RX ADMIN — IPRATROPIUM BROMIDE AND ALBUTEROL SULFATE 3 ML: .5; 3 SOLUTION RESPIRATORY (INHALATION) at 10:38

## 2017-11-20 RX ADMIN — AMPICILLIN SODIUM AND SULBACTAM SODIUM 3 G: 2; 1 INJECTION, POWDER, FOR SOLUTION INTRAMUSCULAR; INTRAVENOUS at 06:25

## 2017-11-20 ASSESSMENT — COGNITIVE AND FUNCTIONAL STATUS - GENERAL
SUGGESTED CMS G CODE MODIFIER MOBILITY: CM
STANDING UP FROM CHAIR USING ARMS: TOTAL
EATING MEALS: A LITTLE
HELP NEEDED FOR BATHING: A LOT
WALKING IN HOSPITAL ROOM: TOTAL
DRESSING REGULAR LOWER BODY CLOTHING: TOTAL
DRESSING REGULAR UPPER BODY CLOTHING: A LOT
MOBILITY SCORE: 8
PERSONAL GROOMING: A LOT
MOVING FROM LYING ON BACK TO SITTING ON SIDE OF FLAT BED: UNABLE
CLIMB 3 TO 5 STEPS WITH RAILING: TOTAL
DAILY ACTIVITIY SCORE: 11
MOVING TO AND FROM BED TO CHAIR: A LOT
TOILETING: TOTAL
SUGGESTED CMS G CODE MODIFIER DAILY ACTIVITY: CL
TURNING FROM BACK TO SIDE WHILE IN FLAT BAD: A LOT

## 2017-11-20 ASSESSMENT — ENCOUNTER SYMPTOMS
BRUISES/BLEEDS EASILY: 0
EYE PAIN: 0
WEIGHT LOSS: 0
MYALGIAS: 0
SPUTUM PRODUCTION: 0
DEPRESSION: 0
HEARTBURN: 0
ORTHOPNEA: 0
TINGLING: 0
HEADACHES: 0
PHOTOPHOBIA: 0
NAUSEA: 0
VOMITING: 0
PALPITATIONS: 0

## 2017-11-20 ASSESSMENT — PAIN SCALES - GENERAL
PAINLEVEL_OUTOF10: 6
PAINLEVEL_OUTOF10: 6
PAINLEVEL_OUTOF10: 7
PAINLEVEL_OUTOF10: 7
PAINLEVEL_OUTOF10: 9
PAINLEVEL_OUTOF10: 8
PAINLEVEL_OUTOF10: 6

## 2017-11-20 ASSESSMENT — ACTIVITIES OF DAILY LIVING (ADL): TOILETING: INDEPENDENT

## 2017-11-20 ASSESSMENT — GAIT ASSESSMENTS: GAIT LEVEL OF ASSIST: UNABLE TO PARTICIPATE

## 2017-11-20 NOTE — CARE PLAN
Problem: Safety  Goal: Will remain free from injury  Outcome: PROGRESSING AS EXPECTED  Pt remains free from falls or injuries. Pt 2-2 assist to bedside commode. Bed alarm and strip alarm set and in place. Pt calls for assistance appropriately.     Problem: Respiratory:  Goal: Respiratory status will improve  Outcome: PROGRESSING AS EXPECTED  Pt down to 4L NC (baseline) with O2 sats at 94%. No SOB present.

## 2017-11-20 NOTE — WOUND TEAM
Seen by Wound Team.  Sacrum is pink, but blanching. Nursing to perform skin care per protocol. No further follow up by Wound Team unless consulted.

## 2017-11-20 NOTE — PROGRESS NOTES
Notified hospitalist Williams regarding pt's bladder scan results. New orders received for straight cath once.

## 2017-11-20 NOTE — CARE PLAN
Problem: Bronchoconstriction:  Goal: Improve in air movement and diminished wheezing  Respiratory Therapy Update    Interdisciplinary Plan of Care-Goals (Indications)  Obstructive Ventilatory Defect or Pulmonary Disease without Obvious Obstruction: History / Diagnosis (11/19/17 1453)  Interdisciplinary Plan of Care-Outcomes   Bronchodilator Outcome: Patient at Stable Baseline (11/19/17 1453)          #SVN Performed: Yes (11/19/17 1453)                  O2 (LPM): 4 (11/19/17 1453)  O2 Daily Delivery Respiratory : Silicone Nasal Cannula (11/19/17 1453)    Breath Sounds  Pre/Post Intervention: Pre Intervention Assessment (11/19/17 0237)  RUL Breath Sounds: Clear (11/19/17 1600)  RML Breath Sounds: Diminished (11/19/17 1600)  RLL Breath Sounds: Diminished (11/19/17 1600)  LAKE Breath Sounds: Clear (11/19/17 1600)  LLL Breath Sounds: Diminished (11/19/17 1600)     Duoneb given Q4. Pt states he takes MDI at home but does not remember the names of the. Pt extubated today to 4 lpm NC, Pt tolerating well, no signs of distress noted at this time.

## 2017-11-20 NOTE — CARE PLAN
Problem: Nutritional:  Goal: Nutrition support tolerated and meeting greater than 85% of estimated needs  Outcome: MET Date Met: 11/20/17  TF off and nectar thick full liquid diet started

## 2017-11-20 NOTE — CARE PLAN
Problem: Infection  Goal: Will remain free from infection  Outcome: PROGRESSING AS EXPECTED  Pt shows no new or worsening s/s of infection       Problem: Bowel/Gastric:  Goal: Will not experience complications related to bowel motility    Intervention: Implement Bowel Protocol, if applicable  Pt has not had BM since 11/15,  Bowel protocol in use. Pt asking for suppository

## 2017-11-20 NOTE — PROGRESS NOTES
2 RN skin assessment completed with Nasreen RN: Ear red, but blanching. Silicone O2 tubing in place. Foam ear pads applied. Bruise from IV infiltration to L arm. Bilateral arm scabbing. Bilateral LE cellulitis with wound dressings in place, CDI. Heels red, but blanching. Floated on pillow. Bottom scabbed with nickel-sized red nonblanching spot. Picture taken. LDA opened. Wound consult placed. Q2H turns initiated. No other skin abnormalities noted.

## 2017-11-20 NOTE — PROGRESS NOTES
Patient transported to [place] [id number] via hospital bed with this RN and CNA. Report given to JENSEN Fajardo.

## 2017-11-20 NOTE — CARE PLAN
Problem: Skin Integrity  Goal: Risk for impaired skin integrity will decrease  Pt will maintain intact skin

## 2017-11-20 NOTE — PROGRESS NOTES
Hospitalist Williams notified of pt failing dysphagia screen and pt's back pain. New orders received for speech eval and morphine 5 mg IV Q4H PRN. Will administer.

## 2017-11-20 NOTE — PROGRESS NOTES
"Pulmonary Progress Note    Patient ID:   Name:             MARSHAL Calzada     YOB: 1940  Age:                 77 y.o.  male   MRN:               [id number]                                                  Subjective:  Feels better. Breathing improved.    Interval Changes:   11/20: Transferred to the floor. On 6LPM O2. Breathing improved.    Objective:   Vitals/ General Appearance:   Weight/BMI: Body mass index is 39.11 kg/m².  Blood pressure 121/49, pulse (!) 53, temperature 37 °C (98.6 °F), resp. rate (!) 24, height 1.74 m (5' 8.5\"), weight 118.4 kg (261 lb 0.4 oz), SpO2 95 %.  Vitals:    11/20/17 0825 11/20/17 1038 11/20/17 1229 11/20/17 1452   BP: 128/72  121/49    Pulse: 79 81 81 (!) 53   Resp: 20 (!) 28 (!) 22 (!) 24   Temp: 36.8 °C (98.2 °F)  37 °C (98.6 °F)    SpO2: 92% 94% 95% 95%   Weight:       Height:         Oxygen Therapy:  Pulse Oximetry: 95 %, O2 (LPM): 6, O2 Delivery: Silicone Nasal Cannula    Constitutional:   Obese male, on nasal cannula. Not in respiratory distress.  HENMT:  Normocephalic, Atraumatic, Oropharynx moist mucous membranes, No oral exudates, Nose normal.  No thyromegaly.  Eyes:  EOMI, Conjunctiva normal, No discharge.  Neck:  Normal range of motion, No cervical tenderness,  no JVD.  Cardiovascular:  Normal heart rate, Normal rhythm, No murmurs, No rubs, No gallops.   Extremitites with intact distal pulses. Ext has dressing on b/l.  Lungs: Scattered wheeze. Good air movement b/l.   Abdomen: Bowel sounds normal, Soft, No tenderness, No guarding, No rebound, No masses, No hepatosplenomegaly.  Skin: b/l LE has dressing upto midcalf.  Neurologic: Alert & oriented x 3, No focal deficits noted, cranial nerves II through X are grossly intact.  Psychiatric: Affect normal, Judgment normal, Mood normal.    Labs:  Recent Labs      11/18/17   0535  11/19/17   0510  11/20/17   0318   WBC  6.4  6.7  11.0*   RBC  3.44*  3.48*  3.87*   HEMOGLOBIN  10.2*  10.0*  10.9*   HEMATOCRIT  30.7*  30.9* "  34.9*   MCV  89.2  88.8  90.2   MCH  29.7  28.7  28.2   MCHC  33.2*  32.4*  31.2*   RDW  46.5  48.7  48.6   PLATELETCT  209  188  223   MPV  9.8  9.3  9.6                 Recent Labs      11/18/17   0535  11/19/17   0510  11/20/17   0318   SODIUM  130*  134*  137   POTASSIUM  3.9  4.3  4.0   CHLORIDE  100  102  102   CO2  26  24  27   GLUCOSE  191*  156*  81   BUN  28*  43*  42*     Recent Labs      11/18/17   0535  11/19/17   0510  11/20/17   0318   SODIUM  130*  134*  137   POTASSIUM  3.9  4.3  4.0   CHLORIDE  100  102  102   CO2  26  24  27   BUN  28*  43*  42*   CREATININE  1.16  1.28  1.26   MAGNESIUM  2.2   --    --    PHOSPHORUS  3.1   --    --    CALCIUM  8.4*  8.2*  8.7     Results for orders placed or performed during the hospital encounter of 11/15/17   ECHOCARDIOGRAM COMP W/O CONT   Result Value Ref Range    Eject.Frac. MOD BP 60.21     Eject.Frac. MOD 4C 61.19     Eject.Frac. MOD 2C 56.3     Left Ventrical Ejection Fraction 60          Imaging:   DX-CHEST-PORTABLE (1 VIEW)   Final Result      Stable chest appearance compared with 11/19.      DX-CHEST-PORTABLE (1 VIEW)   Final Result      Findings consistent with pulmonary edema, probably cardiogenic, with no significant change.      DX-CHEST-PORTABLE (1 VIEW)   Final Result      Improving pulmonary edema.      DX-CHEST-PORTABLE (1 VIEW)   Final Result      Stable chest findings compared with 11/16.      DX-ABDOMEN FOR TUBE PLACEMENT   Final Result      Feeding tube in place as noted above.      ECHOCARDIOGRAM COMP W/O CONT   Final Result      DX-CHEST-PORTABLE (1 VIEW)   Final Result         1.  Pulmonary edema and/or infiltrates are identified, which appear somewhat increased since the prior exam.   2.  Cardiomegaly   3.  Atherosclerosis      DX-ABDOMEN FOR TUBE PLACEMENT   Final Result         1.  Nasogastric tube appears to terminate near the gastroesophageal junction, recommend advancement.      DX-CHEST-PORTABLE (1 VIEW)   Final Result         1.   Cardiomegaly with pulmonary vascular congestion.   2.  Atherosclerosis      DX-CHEST-PORTABLE (1 VIEW)   Final Result      Mild bilateral lung base atelectasis with pneumonitis or edema not excluded.          Hospital Medications:    Current Facility-Administered Medications:   •  morphine (pf) 10 mg/ml injection 5 mg, 5 mg, Intravenous, Q4HRS PRN, Kane Kramer M.D., 5 mg at 11/20/17 1055  •  ipratropium-albuterol (DUONEB) nebulizer solution 3 mL, 3 mL, Nebulization, 4X/DAY (RT), Aaron Day M.D., 3 mL at 11/20/17 1451  •  tamsulosin (FLOMAX) capsule 0.4 mg, 0.4 mg, Oral, AFTER BREAKFAST, Hector Miranda M.D.  •  [DISCONTINUED] insulin regular (HUMULIN R) injection 2-9 Units, 2-9 Units, Subcutaneous, 4X/DAY ACHS, Stopped at 11/20/17 0700 **AND** Accu-Chek ACHS, , , Q AC AND BEDTIME(S) **AND** NOTIFY MD and PharmD, , , Once **AND** glucose 4 g chewable tablet 16 g, 16 g, Oral, Q15 MIN PRN **AND** dextrose 50% (D50W) injection 25 mL, 25 mL, Intravenous, Q15 MIN PRN, Bernard Esparza, D.O.  •  hydrALAZINE (APRESOLINE) injection 10-20 mg, 10-20 mg, Intravenous, Q4HRS PRN, Aaron Day M.D., 10 mg at 11/20/17 0004  •  oxycodone immediate release (ROXICODONE) tablet 10 mg, 10 mg, Oral, Q8HRS, Bernard Esparza, D.O., 10 mg at 11/20/17 1216  •  furosemide (LASIX) injection 20 mg, 20 mg, Intravenous, Q DAY, Bernard Esparza D.O., 20 mg at 11/20/17 1054  •  carvedilol (COREG) tablet 12.5 mg, 12.5 mg, Oral, BID, Aaron Day M.D., 12.5 mg at 11/20/17 1054  •  lisinopril (PRINIVIL) tablet 20 mg, 20 mg, Oral, Nightly, Aaron Day M.D., 20 mg at 11/19/17 2142  •  rivaroxaban (XARELTO) tablet 20 mg, 20 mg, Oral, PM MEAL, Gary Lacey M.D., 20 mg at 11/19/17 1800  •  atorvastatin (LIPITOR) tablet 20 mg, 20 mg, Oral, DAILY, Vivek Villalobos M.D., 20 mg at 11/20/17 1054  •  gabapentin (NEURONTIN) capsule 300 mg, 300 mg, Oral, QHS, Vivek Villalobos M.D., 300 mg at  11/19/17 2142  •  senna-docusate (PERICOLACE or SENOKOT S) 8.6-50 MG per tablet 2 Tab, 2 Tab, Oral, BID, 2 Tab at 11/20/17 1054 **AND** polyethylene glycol/lytes (MIRALAX) PACKET 1 Packet, 1 Packet, Oral, QDAY PRN **AND** magnesium hydroxide (MILK OF MAGNESIA) suspension 30 mL, 30 mL, Oral, QDAY PRN **AND** bisacodyl (DULCOLAX) suppository 10 mg, 10 mg, Rectal, QDAY PRN, Vivek Villalobos M.D., 10 mg at 11/20/17 1044  •  acetaminophen (TYLENOL) tablet 650 mg, 650 mg, Oral, Q6HRS PRN, Vivek Villalobos M.D.  •  Notify provider if pain remains uncontrolled, , , CONTINUOUS **AND** Use the numeric rating scale (NRS-11) on regular floors and Critical-Care Pain Observation Tool (CPOT) on ICUs/Trauma to assess pain, , , CONTINUOUS **AND** Pulse Ox (Oximetry), , , CONTINUOUS **AND** Pharmacy Consult Request ...Pain Management Review, , Other, PRN **AND** If patient difficult to arouse and/or has respiratory depression, stop any opiates that are currently infusing and call a Rapid Response., , , CONTINUOUS **AND** oxycodone immediate-release (ROXICODONE) tablet 2.5 mg, 2.5 mg, Oral, Q3HRS PRN **AND** oxycodone immediate-release (ROXICODONE) tablet 5 mg, 5 mg, Oral, Q3HRS PRN, 5 mg at 11/19/17 1811 **AND** HYDROmorphone (DILAUDID) injection 0.25 mg, 0.25 mg, Intravenous, Q3HRS PRN, Vivek Villalobos M.D., 0.25 mg at 11/18/17 1346  •  ondansetron (ZOFRAN) syringe/vial injection 4 mg, 4 mg, Intravenous, Q4HRS PRN, Vivek Villalobos M.D.  •  ondansetron (ZOFRAN ODT) dispertab 4 mg, 4 mg, Oral, Q4HRS PRN, Vivek Villalobos M.D.  •  ampicillin/sulbactam (UNASYN) 3 g in  mL IVPB, 3 g, Intravenous, Q6HRS, Gary Lacey M.D., Stopped at 11/20/17 1246  •  Respiratory Care per Protocol, , Nebulization, Continuous RT, Aaron Day M.D.  •  fentaNYL (SUBLIMAZE) injection 25 mcg, 25 mcg, Intravenous, Q HOUR PRN **OR** fentaNYL (SUBLIMAZE) injection 50 mcg, 50 mcg, Intravenous, Q HOUR PRN **OR** fentaNYL  (SUBLIMAZE) injection 100 mcg, 100 mcg, Intravenous, Q HOUR PRN, Aaron Day M.D., 100 mcg at 11/18/17 1106  •  NS infusion 3,333 mL, 30 mL/kg, Intravenous, Once PRN, Aaron Day M.D., Stopped at 11/15/17 1815  •  NS (BOLUS) infusion 500 mL, 500 mL, Intravenous, Once PRN, Aaron Day M.D.    Current Outpatient Medications:  Prescriptions Prior to Admission   Medication Sig Dispense Refill Last Dose   • atorvastatin (LIPITOR) 20 MG Tab Take 20 mg by mouth every day.   unknown at unknown   • lisinopril (PRINIVIL) 20 MG Tab Take 20 mg by mouth every bedtime.   unknown at unknown   • furosemide (LASIX) 20 MG Tab Take 20 mg by mouth every day.   unknown at unknown   • morphine (MS IR) 15 MG tablet Take 15 mg by mouth every 8 hours as needed for Severe Pain.   unknown at unknown   • carvedilol (COREG) 12.5 MG Tab Take 12.5 mg by mouth 2 times a day, with meals.   unknown at unknown   • gabapentin (NEURONTIN) 300 MG Cap Take 300 mg by mouth every bedtime.   unknown at unknown   • rivaroxaban (XARELTO) 15 MG Tab tablet Take 15 mg by mouth with dinner.   unknown at unknown   • clindamycin (CLEOCIN) 150 MG Cap Take 150 mg by mouth 3 times a day. Pt started a 10 day course on 10/17   unknown at unknown       Medication Allergy:  Allergies   Allergen Reactions   • Other Drug      Allergic to 1 med, unsure of the name.        Assessment and Plan:  Principal Problem:    COPD exacerbation (CMS-[facility]) POA: Yes  Active Problems:    Acute on chronic respiratory failure with hypoxia and hypercapnia (CMS-[facility]) POA: Yes    Chronic pulmonary embolism (CMS-[facility]) POA: Yes    Pneumonia POA: Yes    Hyponatremia POA: Yes    LINDSEY (acute kidney injury) (CMS-[facility]) POA: Yes    Venous stasis ulcer (CMS-[facility]) POA: Yes    Shock (CMS-[facility]) POA: Yes    Plan:  Pt is getting better.  Cont duo Nebs, and Abx, cont O2 as needed   Cont XArelto for h/o PE   Cont PT   Pulm will sign off at this point Please call for any questions

## 2017-11-20 NOTE — PROGRESS NOTES
12 hour chart check complete. Assumed care of patient. Bedside report completed, VSS, patient resting comfortably.

## 2017-11-20 NOTE — PROGRESS NOTES
Glenda Puentes Fall Risk Assessment:     Last Known Fall: No falls  Mobility: Use of assistive device/requires assist of two people  Medications: Cardiovascular and central nervous system meds  Mental Status/LOC/Awareness: Awake, alert, and oriented to date, place, and person  Toileting Needs: Use of catheters or diversion devices, Use of assistive device (Bedside commode, bedpan, urinal)  Volume/Electrolyte Status: No problems  Communication/Sensory: Visual (Glasses)/hearing deficit  Behavior: Appropriate behavior  Glenda Puentes Fall Risk Total: 12  Fall Risk Level: MODERATE RISK    Universal Fall Precautions:  call light/belongings in reach, bed in low position and locked, wheelchairs and assistive devices out of sight, siderails up x 2, adequate lighting, use non-slip footwear, clutter free and spill free environment, educate on level of risk, educate to call for assistance    Fall Risk Level Interventions:    TRIAL (TELE 8, NEURO, MED ALAN 5) Moderate Fall Risk Interventions  Place yellow fall risk ID band on patient: completed  Provide patient/family education based on risk assessment : completed  Educate patient/family to call staff for assistance when getting out of bed: completed  Place fall precaution signage outside patient door: completed  Utilize bed/chair fall alarm: completed     Patient Specific Interventions:     Medication: review medications with patient and family  Mental Status/LOC/Awareness: reinforce falls education, check on patient hourly, utilize bed/chair fall alarm and reinforce the use of call light  Toileting: consider obtaining elevated toilet seat or bedside commode (BSC), provide frquent toileting, monitor intake and output/use of appropriate interventions, instruct patient/family on the use of grab bars and instruct patient/family on the need to call for assistance when toileting  Volume/Electrolyte Status: monitor abnormal lab values  Communication/Sensory: update plan of care on  whiteboard, collaborate with doctor for possible speech therapy consult, ensure proper positioning when transferrng/ambulating, ensure patient has glasses/contacts and hearing aids/dentures and for visually impaired patients orient to their room surrounding and do not change their surroundings  Behavioral: encourage patient to voice feelings, engage patient in daily activities, administer medication as ordered and instruct/reinforce fall program rationale  Mobility: utilize bed/chair fall alarm, ensure bed is locked and in lowest position, provide appropriate assistive device, instruct patient to exit bed on their strongest side and collaborate with doctor for possible PT/OT consult

## 2017-11-20 NOTE — THERAPY
"  Speech Language Therapy Clinical Swallow Evaluation completed.  Functional Status: Pt awake, alert and cooperative. Mild-moderate WOB at baseline and pt with confusion and memory deficits stating \"I think I have dementia.\" Higher aspiration risk r/t compromised resp status. Recommend downgrade to NTFL diet. Hold with any s/sx of aspiration.     Recommendations - Diet: Nectar Thick Full Liquid                          Strategies: Monitor during meals, Assistance needed for meal tray set-up and Head of Bed at 90 Degrees                          Medication Administration: Crush all Medications in Puree, Float Whole with Puree  Plan of Care: Will benefit from Speech Therapy 3 times per week  Post-Acute Therapy: Discharge to a transitional care facility for continued skilled therapy services.    See \"Rehab Therapy-Acute\" Patient Summary Report for complete documentation.   "

## 2017-11-20 NOTE — THERAPY
"76 y/o male adm for AMS, dx: COPD exacerbation with BLE cellulitis. Morbidly obese. Generalized weakness with poor tolerance to activity. Max assist for bed mobility and EOB sitting balance, unable to stand and transfer at this time. Acute PT to address these problems for pt to achieveh higher level of function and decrease level of care.    Physical Therapy Evaluation completed.   Bed Mobility:  Supine to Sit: Maximal Assist  Transfers: Sit to Stand: Unable to Participate  Gait: Level Of Assist: Unable to Participate with Front-Wheel Walker       Plan of Care: Will benefit from Physical Therapy 3 times per week  Discharge Recommendations: Equipment: Will Continue to Assess for Equipment Needs. Post-acute therapy Discharge to a transitional care facility for continued skilled therapy services.    See \"Rehab Therapy-Acute\" Patient Summary Report for complete documentation.     "

## 2017-11-20 NOTE — CARE PLAN
Problem: Fluid Volume:  Goal: Will maintain balanced intake and output  Pt's fluid volume will remain appropriate and safe

## 2017-11-20 NOTE — PROGRESS NOTES
"Pulmonary Critical Care Progress Note        Chief Complaint: Level consciousness with hypoxemia    History of Present Illness: 77 y.o. Male, O2 dep COPD 4 lpm, chronic leg wounds, h/o PE on rivaroxaban; found down in chair at home, min responsive, hypoxic and \"twitching.\"  Intubated in ED    Please note the history obtained by my partner Dr. Judson Harper.      ROS:  Respiratory: unable to perform due to the patient's inability to effectively communicate, Cardiac: unable to perform due to the patient's inability to effectively communicate, GI: unable to perform due to the patient's inability to effectively communicate.  All other systems negative.    Interval Events:  24 hour interval history reviewed    -Patient overnight has improved significantly. The patient is on minimal oxygen support.    -Additional diuresis has been given his x-rays improved.    -Patient has had favorable spontaneous breathing trial as well with a RSBI 51.    PFSH:  No change.    Respiratory:     Pulse Oximetry: 91 %  Chest Tube Drains:          Exam: Bibasilar crackles. Rhonchi is now absent on today's exam.  ImagingNone - Reviewed and CXR  I have personally reviewed the chest x-ray my impression is : Central venous catheters in good position. The remains slight bibasilar effusion with atelectasis. Overall interstitial infiltrates have cleared  Recent Labs      11/17/17   0422  11/18/17   0411  11/19/17   0440   ISTATAPH  7.434  7.419  7.423   ISTATAPCO2  36.3  38.3*  39.1*   ISTATAPO2  70  71  81   ISTATATCO2  25  26  27   [id number]  94  94  96   ISTATARTHCO3  24.3  24.8  25.6*   ISTATARTBE  0  0  1   ISTATTEMP  36.7 C  97.7 F  97.9 F   ISTATFIO2  50  50  50   ISTATSPEC  Arterial  Arterial  Arterial   ISTATAPHTC  7.438  7.427  7.429   [id number]  68  69  79       HemoDynamics:  Pulse: 85, Heart Rate (Monitored): 85  NIBP: 158/73  CVP (mm Hg): (!) 19 MM HG    Exam: bundle branch block. Patient has had rate regular without obvious murmur " rub or gallop. PMI is laterally displaced. The patient remains on antihypertensive therapy.  Imaging: echo Reviewed:      CONCLUSIONS  No prior study is available for comparison.   Left ventricular ejection fraction is visually estimated to be 60%.   Normal regional wall motion.   Diastolic dysfunction is indeterminate.  No evidence of valvular abnormality based on Doppler evaluation.   Estimated right ventricular systolic pressure is 16 mmHg. Right atrial   pressure is estimated to be 15 mmHg.       Neuro:  GCS Total [place] Coma Score: 11       Exam: Heavily sedated overnight but this morning sedation hold revealed patient was alert and oriented following commands. Moving all 4 limbs without deficits. Reflexes were normal. He was able to lift his head off the bed for extubation.  Imaging: None - Reviewed    Fluids:  Intake/Output       11/17/17 0700 - 11/18/17 0659 11/18/17 0700 - 11/19/17 0659 11/19/17 0700 - 11/20/17 0659      4954-4021 6002-0338 Total 3443-3929 9873-6780 Total 2726-7209 9558-9734 Total       Intake    I.V.  836.5  1644 2480.4  813.3  539.2 1352.4  --  -- --    Propofol Volume 436.5 544 980.4 363.3 339.2 702.4 -- -- --    IV Piggyback Volume (IV Piggyback) 100 200 300 450 200 650 -- -- --    IV Volume (NS)  -- -- -- -- -- --    Other  30  -- 30  --  30 30  --  -- --    Medications (P.O./ Enteral Liquids) 30 -- 30 -- 30 30 -- -- --    Enteral  230  680 910  880  600 1480  --  -- --    Enteral Volume 150 500 650  -- -- --    Free Water / Tube Flush 80 180 260 -- 120 120 -- -- --    Total Intake 1096.5 2324 3420.4 1693.3 1169.2 2862.4 -- -- --       Output    Urine  645  650 1295  400  605 1005  --  -- --    Indwelling Cathether   -- -- --    Void (ml) 295 -- 295 -- -- -- -- -- --    Stool  --  -- --  --  -- --  --  -- --    Number of Times Stooled 0 x -- 0 x -- -- -- -- -- --    Total Output   -- -- --       Net I/O     451.5  1674 2125.4 1293.3 564.2 1857.4 -- -- --           Recent Labs      1735  17   0510   SODIUM  131*  130*  134*   POTASSIUM  3.9  3.9  4.3   CHLORIDE  99  100  102   CO2  24  26  24   BUN  20  28*  43*   CREATININE  1.29  1.16  1.28   MAGNESIUM  2.3  2.2   --    PHOSPHORUS  2.5  3.1   --    CALCIUM  8.7  8.4*  8.2*       GI/Nutrition:  Exam: abdomen is soft and non-tender, normal active bowel sounds  Imaging: None - Reviewed  On hold for extubation  Liver Function  Recent Labs      1735  17   0510   GLUCOSE  167*  191*  156*       Heme:  Recent Labs      1735  17   0510   RBC  3.52*  3.44*  3.48*   HEMOGLOBIN  10.2*  10.2*  10.0*   HEMATOCRIT  30.8*  30.7*  30.9*   PLATELETCT  188  209  188       Infectious Disease:  Monitored Temp  Av.2 °C (98.9 °F)  Min: 36.6 °C (97.9 °F)  Max: 37.8 °C (100 °F)  Temp  Av.1 °C (98.8 °F)  Min: 37.1 °C (98.7 °F)  Max: 37.2 °C (98.9 °F)  Micro: antibiotics reviewed: Left lower lobe BAL revealing gwen but no obvious identified organism. Patient remains on Zosyn day 4 of 5. Azithromycin is discontinued.  Recent Labs      17   0510   WBC  7.4  6.4  6.7   NEUTSPOLYS  91.20*  87.80*  76.90*   LYMPHOCYTES  6.20*  7.70*  14.20*   MONOCYTES  2.00  3.50  7.90   EOSINOPHILS  0.00  0.00  0.00   BASOPHILS  0.10  0.20  0.10     Current Facility-Administered Medications   Medication Dose Frequency Provider Last Rate Last Dose   • hydrALAZINE (APRESOLINE) injection 10-20 mg  10-20 mg Q4HRS PRN Aaron Day M.D.   20 mg at 17 1346   • oxycodone immediate release (ROXICODONE) tablet 10 mg  10 mg Q8HRS Bernard Esparza, D.O.   10 mg at 17 1429   • furosemide (LASIX) injection 20 mg  20 mg Q DAY Bernard Esparza D.O.   20 mg at 17 0903   • insulin regular (HUMULIN R) injection 2-9 Units  2-9 Units Q6HRS Vivek Villalobos,  M.D.   Stopped at 11/19/17 0000    And   • glucose 4 g chewable tablet 16 g  16 g Q15 MIN PRN Vivek Villalobos M.D.        And   • dextrose 50% (D50W) injection 25 mL  25 mL Q15 MIN PRN Vivek Villalobos M.D.       • carvedilol (COREG) tablet 12.5 mg  12.5 mg BID Aaron Day M.D.   12.5 mg at 11/19/17 0903   • lisinopril (PRINIVIL) tablet 20 mg  20 mg Nightly Aaron Day M.D.   20 mg at 11/18/17 2001   • rivaroxaban (XARELTO) tablet 20 mg  20 mg PM MEAL Gary Lacey M.D.   20 mg at 11/18/17 1800   • propofol (DIPRIVAN) injection  0-80 mcg/kg/min Continuous Deven Zepeda M.D.   Stopped at 11/19/17 0539   • atorvastatin (LIPITOR) tablet 20 mg  20 mg DAILY Vivek Villalobos M.D.   20 mg at 11/19/17 0903   • gabapentin (NEURONTIN) capsule 300 mg  300 mg QHS Vivek Villalobos M.D.   300 mg at 11/18/17 2001   • senna-docusate (PERICOLACE or SENOKOT S) 8.6-50 MG per tablet 2 Tab  2 Tab BID Vivek Villalobos M.D.   Stopped at 11/19/17 0900    And   • polyethylene glycol/lytes (MIRALAX) PACKET 1 Packet  1 Packet QDAY PRN Vivek Villalobos M.D.        And   • magnesium hydroxide (MILK OF MAGNESIA) suspension 30 mL  30 mL QDAY PRN Vivek Villalobos M.D.        And   • bisacodyl (DULCOLAX) suppository 10 mg  10 mg QDAY PRN Vivek Villalobos M.D.       • acetaminophen (TYLENOL) tablet 650 mg  650 mg Q6HRS PRN Vivek Villalobos M.D.       • Pharmacy Consult Request ...Pain Management Review   PRN Vivek F Elizabeth Merhi, M.D.        And   • oxycodone immediate-release (ROXICODONE) tablet 2.5 mg  2.5 mg Q3HRS PRN Vivek Villalobos M.D.        And   • oxycodone immediate-release (ROXICODONE) tablet 5 mg  5 mg Q3HRS PRVERO Villalobos M.D.   5 mg at 11/19/17 1232    And   • HYDROmorphone (DILAUDID) injection 0.25 mg  0.25 mg Q3HRS PRVERO Villalobos M.D.   0.25 mg at 11/18/17 1346   • ondansetron (ZOFRAN) syringe/vial injection 4 mg  4 mg Q4HRS PRVERO Villalobos  M.D.       • ondansetron (ZOFRAN ODT) dispertab 4 mg  4 mg Q4HRS PRN Vivek Villalobos M.D.       • ampicillin/sulbactam (UNASYN) 3 g in  mL IVPB  3 g Q6HRS Gary Lacey M.D.   Stopped at 11/19/17 1302   • Respiratory Care per Protocol   Continuous RT Aaron Day M.D.       • chlorhexidine (PERIDEX) 0.12 % solution 15 mL  15 mL BID Aaron Day M.D.   Stopped at 11/19/17 0900   • lidocaine (XYLOCAINE) 1%  injection  1-2 mL Q30 MIN PRN Aaron Day M.D.       • MD ALERT...Adult ICU Electrolyte Replacement per Pharmacy Protocol   pharmacy to dose Aaron Day M.D.       • fentaNYL (SUBLIMAZE) injection 25 mcg  25 mcg Q HOUR PRN Aaron Day M.D.        Or   • fentaNYL (SUBLIMAZE) injection 50 mcg  50 mcg Q HOUR PRN Aaron Day M.D.        Or   • fentaNYL (SUBLIMAZE) injection 100 mcg  100 mcg Q HOUR PRN Aaron Day M.D.   100 mcg at 11/18/17 1106   • ipratropium-albuterol (DUONEB) nebulizer solution 3 mL  3 mL Q2HRS PRN (RT) Aaron Day M.D.       • ipratropium-albuterol (DUONEB) nebulizer solution 3 mL  3 mL Q4HRS (RT) Aaron Day M.D.   3 mL at 11/19/17 1453   • famotidine (PEPCID) tablet 20 mg  20 mg DAILY Aaron Day M.D.   20 mg at 11/17/17 0849    Or   • famotidine (PEPCID) injection 20 mg  20 mg DAILY Aaron Day M.D.   20 mg at 11/19/17 0903   • NS infusion 3,333 mL  30 mL/kg Once PRN Aaron Day M.D.   Stopped at 11/15/17 1815   • NS (BOLUS) infusion 500 mL  500 mL Once PRN DILIA RiceD.         Last reviewed on 11/15/2017  7:32 PM by [facility]    Quality  Measures:   Labs reviewed, Medications reviewed and Radiology images reviewed   Muniz catheter: No Muniz   Central line in place: Sepsis       DVT prophylaxis - mechanical: SCDs and Foot Pumps   Ulcer prophylaxis: Yes      Assessment and plan:    1. Acute on chronic hypercapnic and hypoxemic  respiratory failure patient required intubation on 11/15/2017.    Patient remains on Zosyn and the patient came off of azithromycin pending culture data from bronchoscopy. Bronchoscopic data reveal only oral gwen. We did expect aspiration however    Patient today had improvement on chest x-ray, and has responded to diuresis.     Extubate today.    2. Coronary artery disease, with previous ischemic cardiomyopathy.. Patient has been hypertensive overnight and required hydralazine, lisinopril and Coreg. I have also added additional diuresis. This is in preparation for extubation.    3. Probable acute community acquired pneumonia. Possible aspiration as well.    4. Anemia. Patient has no need for transfusion at this time.    5. Lower extremity cellul[facility] in the [facility]. Patient was on chronic clindamycin. Wound care is following    6. History of pulmonary embolism. Patient is on Zaroxolyn. At this time no active bleeding and will utilize this as DVT prophylaxis as well.    Comment: does have a do not resuscitate status including no shock CPR or ACLS. Patient code status to be clarified, since it is unclear if the patient would permit renewed the patient. At this time patient will allow BiPAP if necessary however don't believe he will require based on his bedside parameters today.    We will plan on transfer out of the ICU patient tolerates well today and signed only be given to hospitalist service.    Discussed patient condition and risk of morbidity and/or mortality with multidisciplinary rounds.   The patient remains critically ill.  Critical care time = 35 minutes in directly providing and coordinating critical care and extensive data review.  No time overlap and excludes procedures.    Bernard Esparza D.O.

## 2017-11-20 NOTE — PROGRESS NOTES
Pt arrived to floor by SICU RN via hospital bed. Pt care assumed. Monitor room notified of pt arrival to floor and name verified on monitor- HR SR. Patient assessed. A&O x 4. No distress present. Call light, phone, and bedside table within reach. White board updated and plan of care discussed with patient. Bed alarm and strip alarm set and in place. Pt calls for assistance appropriately. Pt on . No concerns present at this time. Will continue to monitor.

## 2017-11-20 NOTE — PROGRESS NOTES
Assumed care of pt.  Bedside report received and whiteboard updated. Discussed plan of care for the day and answered questions.  Chart and MAR reviewed.  Call light and personal belongings are within reach.  Bed in low position and locked. Pt has no needs at this time.    Pt npo pending swallow eval

## 2017-11-20 NOTE — THERAPY
"Occupational Therapy Evaluation completed.   Functional Status:  Max A x2 supine to sit.  Total A LB dressing.  Pt sat EOB but unable to support self.  Pt washed his face with mod A.  Pt reported high fatigue when EOB, returned to supine with total A x2.  Plan of Care: Will benefit from Occupational Therapy 3 times per week  Discharge Recommendations:  Equipment: Will Continue to Assess for Equipment Needs. Post-acute therapy Discharge to a transitional care facility for continued skilled therapy services.    See \"Rehab Therapy-Acute\" Patient Summary Report for complete documentation.    "

## 2017-11-21 ENCOUNTER — APPOINTMENT (OUTPATIENT)
Dept: RADIOLOGY | Facility: MEDICAL CENTER | Age: 77
DRG: 166 | End: 2017-11-21
Attending: INTERNAL MEDICINE
Payer: MEDICARE

## 2017-11-21 PROBLEM — I48.0 PAROXYSMAL ATRIAL FIBRILLATION (HCC): Status: ACTIVE | Noted: 2017-11-21

## 2017-11-21 LAB
ANION GAP SERPL CALC-SCNC: 6 MMOL/L (ref 0–11.9)
BASOPHILS # BLD AUTO: 0.4 % (ref 0–1.8)
BASOPHILS # BLD: 0.04 K/UL (ref 0–0.12)
BUN SERPL-MCNC: 41 MG/DL (ref 8–22)
CALCIUM SERPL-MCNC: 8.7 MG/DL (ref 8.5–10.5)
CHLORIDE SERPL-SCNC: 102 MMOL/L (ref 96–112)
CO2 SERPL-SCNC: 29 MMOL/L (ref 20–33)
CREAT SERPL-MCNC: 1.25 MG/DL (ref 0.5–1.4)
EKG IMPRESSION: NORMAL
EOSINOPHIL # BLD AUTO: 0.16 K/UL (ref 0–0.51)
EOSINOPHIL NFR BLD: 1.4 % (ref 0–6.9)
ERYTHROCYTE [DISTWIDTH] IN BLOOD BY AUTOMATED COUNT: 48.9 FL (ref 35.9–50)
GFR SERPL CREATININE-BSD FRML MDRD: 56 ML/MIN/1.73 M 2
GLUCOSE SERPL-MCNC: 144 MG/DL (ref 65–99)
HCT VFR BLD AUTO: 35.4 % (ref 42–52)
HGB BLD-MCNC: 11.2 G/DL (ref 14–18)
IMM GRANULOCYTES # BLD AUTO: 0.04 K/UL (ref 0–0.11)
IMM GRANULOCYTES NFR BLD AUTO: 0.4 % (ref 0–0.9)
LYMPHOCYTES # BLD AUTO: 1 K/UL (ref 1–4.8)
LYMPHOCYTES NFR BLD: 9.1 % (ref 22–41)
MCH RBC QN AUTO: 28.6 PG (ref 27–33)
MCHC RBC AUTO-ENTMCNC: 31.6 G/DL (ref 33.7–35.3)
MCV RBC AUTO: 90.5 FL (ref 81.4–97.8)
MONOCYTES # BLD AUTO: 0.88 K/UL (ref 0–0.85)
MONOCYTES NFR BLD AUTO: 8 % (ref 0–13.4)
NEUTROPHILS # BLD AUTO: 8.92 K/UL (ref 1.82–7.42)
NEUTROPHILS NFR BLD: 80.7 % (ref 44–72)
NRBC # BLD AUTO: 0 K/UL
NRBC BLD AUTO-RTO: 0 /100 WBC
PLATELET # BLD AUTO: 202 K/UL (ref 164–446)
PMV BLD AUTO: 9.9 FL (ref 9–12.9)
POTASSIUM SERPL-SCNC: 4 MMOL/L (ref 3.6–5.5)
RBC # BLD AUTO: 3.91 M/UL (ref 4.7–6.1)
SODIUM SERPL-SCNC: 137 MMOL/L (ref 135–145)
TSH SERPL DL<=0.005 MIU/L-ACNC: 0.97 UIU/ML (ref 0.3–3.7)
WBC # BLD AUTO: 11 K/UL (ref 4.8–10.8)

## 2017-11-21 PROCEDURE — A9270 NON-COVERED ITEM OR SERVICE: HCPCS | Performed by: HOSPITALIST

## 2017-11-21 PROCEDURE — 84443 ASSAY THYROID STIM HORMONE: CPT

## 2017-11-21 PROCEDURE — 36415 COLL VENOUS BLD VENIPUNCTURE: CPT

## 2017-11-21 PROCEDURE — 94640 AIRWAY INHALATION TREATMENT: CPT

## 2017-11-21 PROCEDURE — 85025 COMPLETE CBC W/AUTO DIFF WBC: CPT

## 2017-11-21 PROCEDURE — 99232 SBSQ HOSP IP/OBS MODERATE 35: CPT | Performed by: INTERNAL MEDICINE

## 2017-11-21 PROCEDURE — 700102 HCHG RX REV CODE 250 W/ 637 OVERRIDE(OP): Performed by: HOSPITALIST

## 2017-11-21 PROCEDURE — 80048 BASIC METABOLIC PNL TOTAL CA: CPT

## 2017-11-21 PROCEDURE — 700101 HCHG RX REV CODE 250: Performed by: INTERNAL MEDICINE

## 2017-11-21 PROCEDURE — A9270 NON-COVERED ITEM OR SERVICE: HCPCS | Performed by: INTERNAL MEDICINE

## 2017-11-21 PROCEDURE — 700102 HCHG RX REV CODE 250 W/ 637 OVERRIDE(OP): Performed by: INTERNAL MEDICINE

## 2017-11-21 PROCEDURE — 700111 HCHG RX REV CODE 636 W/ 250 OVERRIDE (IP): Performed by: INTERNAL MEDICINE

## 2017-11-21 PROCEDURE — 770020 HCHG ROOM/CARE - TELE (206)

## 2017-11-21 RX ORDER — LEVALBUTEROL INHALATION SOLUTION 1.25 MG/3ML
1.25 SOLUTION RESPIRATORY (INHALATION)
Status: DISCONTINUED | OUTPATIENT
Start: 2017-11-21 | End: 2017-11-22 | Stop reason: HOSPADM

## 2017-11-21 RX ADMIN — METOPROLOL TARTRATE 50 MG: 50 TABLET, FILM COATED ORAL at 08:39

## 2017-11-21 RX ADMIN — IPRATROPIUM BROMIDE 0.5 MG: 0.5 SOLUTION RESPIRATORY (INHALATION) at 19:42

## 2017-11-21 RX ADMIN — LEVALBUTEROL HYDROCHLORIDE 1.25 MG: 1.25 SOLUTION RESPIRATORY (INHALATION) at 14:59

## 2017-11-21 RX ADMIN — GABAPENTIN 300 MG: 300 CAPSULE ORAL at 20:27

## 2017-11-21 RX ADMIN — ATORVASTATIN CALCIUM 20 MG: 20 TABLET, FILM COATED ORAL at 08:39

## 2017-11-21 RX ADMIN — OXYCODONE HYDROCHLORIDE 10 MG: 10 TABLET ORAL at 14:45

## 2017-11-21 RX ADMIN — OXYCODONE HYDROCHLORIDE 10 MG: 10 TABLET ORAL at 20:27

## 2017-11-21 RX ADMIN — OXYCODONE HYDROCHLORIDE 10 MG: 10 TABLET ORAL at 05:27

## 2017-11-21 RX ADMIN — FUROSEMIDE 20 MG: 10 INJECTION, SOLUTION INTRAMUSCULAR; INTRAVENOUS at 08:39

## 2017-11-21 RX ADMIN — METOPROLOL TARTRATE 50 MG: 50 TABLET, FILM COATED ORAL at 20:27

## 2017-11-21 RX ADMIN — LEVALBUTEROL HYDROCHLORIDE 1.25 MG: 1.25 SOLUTION RESPIRATORY (INHALATION) at 19:42

## 2017-11-21 RX ADMIN — IPRATROPIUM BROMIDE AND ALBUTEROL SULFATE 3 ML: .5; 3 SOLUTION RESPIRATORY (INHALATION) at 11:10

## 2017-11-21 RX ADMIN — TAMSULOSIN HYDROCHLORIDE 0.4 MG: 0.4 CAPSULE ORAL at 08:39

## 2017-11-21 RX ADMIN — RIVAROXABAN 20 MG: 20 TABLET, FILM COATED ORAL at 17:06

## 2017-11-21 RX ADMIN — IPRATROPIUM BROMIDE AND ALBUTEROL SULFATE 3 ML: .5; 3 SOLUTION RESPIRATORY (INHALATION) at 06:30

## 2017-11-21 RX ADMIN — IPRATROPIUM BROMIDE 0.5 MG: 0.5 SOLUTION RESPIRATORY (INHALATION) at 14:59

## 2017-11-21 RX ADMIN — LISINOPRIL 20 MG: 20 TABLET ORAL at 20:27

## 2017-11-21 ASSESSMENT — COPD QUESTIONNAIRES
COPD SCREENING SCORE: 7
DURING THE PAST 4 WEEKS HOW MUCH DID YOU FEEL SHORT OF BREATH: SOME OF THE TIME
HAVE YOU SMOKED AT LEAST 100 CIGARETTES IN YOUR ENTIRE LIFE: YES
DO YOU EVER COUGH UP ANY MUCUS OR PHLEGM?: YES, A FEW DAYS A WEEK OR MONTH

## 2017-11-21 ASSESSMENT — ENCOUNTER SYMPTOMS
BRUISES/BLEEDS EASILY: 0
DEPRESSION: 0
COUGH: 1
PALPITATIONS: 0
CHILLS: 0
NAUSEA: 0
SPUTUM PRODUCTION: 0
TINGLING: 0
BLURRED VISION: 0
FEVER: 0
WEIGHT LOSS: 0
MYALGIAS: 0
DOUBLE VISION: 0
HEADACHES: 0
VOMITING: 0
SHORTNESS OF BREATH: 1
ABDOMINAL PAIN: 0

## 2017-11-21 ASSESSMENT — PAIN SCALES - GENERAL
PAINLEVEL_OUTOF10: 5
PAINLEVEL_OUTOF10: 0
PAINLEVEL_OUTOF10: 2
PAINLEVEL_OUTOF10: 8
PAINLEVEL_OUTOF10: 9
PAINLEVEL_OUTOF10: 8
PAINLEVEL_OUTOF10: 9
PAINLEVEL_OUTOF10: 0
PAINLEVEL_OUTOF10: 2

## 2017-11-21 ASSESSMENT — PATIENT HEALTH QUESTIONNAIRE - PHQ9
1. LITTLE INTEREST OR PLEASURE IN DOING THINGS: NOT AT ALL
SUM OF ALL RESPONSES TO PHQ QUESTIONS 1-9: 0
SUM OF ALL RESPONSES TO PHQ9 QUESTIONS 1 AND 2: 0
2. FEELING DOWN, DEPRESSED, IRRITABLE, OR HOPELESS: NOT AT ALL

## 2017-11-21 NOTE — CARE PLAN
Problem: Oxygenation:  Goal: Maintain adequate oxygenation dependent on patient condition  Outcome: PROGRESSING AS EXPECTED    Intervention: Manage oxygen therapy by monitoring pulse oximetry and/or ABG values  Pt on 5L [place]. SPO2 97%.      Problem: Bronchoconstriction:  Goal: Improve in air movement and diminished wheezing  Outcome: PROGRESSING AS EXPECTED    Intervention: Implement inhaled treatments  Atrovent and Xopenex Q6

## 2017-11-21 NOTE — PROGRESS NOTES
[facility] Hospitalist Progress Note    Date of Service: 2017    Chief Complaint  77 y.o. male admitted 11/15/2017 with respiratory failure with pneumonia    Interval Problem Update  Was transferred from ICU today, doing ok normally on 4L of o2 at home now on 6L, feels better, no fever, c/o constipation, no abdominal pain, passing gas.     Consultants/Specialty  CC physician    Disposition  Probably snf.        Review of Systems   Constitutional: Negative for malaise/fatigue and weight loss.   HENT: Negative for ear pain and tinnitus.    Eyes: Negative for photophobia and pain.   Respiratory: Negative for sputum production.    Cardiovascular: Negative for chest pain, palpitations and orthopnea.   Gastrointestinal: Negative for heartburn, nausea and vomiting.   Genitourinary: Negative for dysuria and urgency.   Musculoskeletal: Negative for myalgias.   Skin: Negative for itching.   Neurological: Negative for tingling and headaches.   Endo/Heme/Allergies: Does not bruise/bleed easily.   Psychiatric/Behavioral: Negative for depression.      Physical Exam  Laboratory/Imaging   Hemodynamics  Temp (24hrs), Av.1 °C (98.8 °F), Min:36.8 °C (98.2 °F), Max:37.4 °C (99.4 °F)   Temperature: 37 °C (98.6 °F), Monitored Temp: 37.4 °C (99.3 °F)  Pulse  Av.9  Min: 53  Max: 117 Heart Rate (Monitored): 76  Blood Pressure : 121/49, NIBP: 142/73 CVP (mm Hg): 5 MM HG    Respiratory      Respiration: (!) 24, Pulse Oximetry: 95 %, O2 Daily Delivery Respiratory : Silicone Nasal Cannula     Given By:: Mouthpiece, Work Of Breathing / Effort: Grunting;Moderate;Accessory Muscle Use  RUL Breath Sounds: Crackles, RML Breath Sounds: Crackles, RLL Breath Sounds: Crackles, LAKE Breath Sounds: Crackles, LLL Breath Sounds: Crackles    Fluids    Intake/Output Summary (Last 24 hours) at 17 1612  Last data filed at 17 1300   Gross per 24 hour   Intake              800 ml   Output             1200 ml   Net             -400 ml        Nutrition  Orders Placed This Encounter   Procedures   • DIET ORDER     Standing Status:   Standing     Number of Occurrences:   1     Order Specific Question:   Diet:     Answer:   Full Liquid [11]     Order Specific Question:   Consistency/Fluid modifications:     Answer:   Nectar Thick [2]     Physical Exam   Constitutional: He is oriented to person, place, and time. No distress.   HENT:   Head: Normocephalic.   Mouth/Throat: No oropharyngeal exudate.   Eyes: Conjunctivae are normal.   Neck: Normal range of motion. Neck supple. No JVD present.   Cardiovascular: Normal rate, regular rhythm and normal heart sounds.    Pulmonary/Chest: Effort normal. No respiratory distress. He has no wheezes. He has rales.   Abdominal: Soft. Bowel sounds are normal. He exhibits no distension. There is tenderness. There is no rebound and no guarding.   Musculoskeletal: Normal range of motion.   Neurological: He is alert and oriented to person, place, and time. He exhibits normal muscle tone.   Psychiatric: He has a normal mood and affect.   Nursing note and vitals reviewed.      Recent Labs      11/18/17   0535  11/19/17   0510  11/20/17 0318   WBC  6.4  6.7  11.0*   RBC  3.44*  3.48*  3.87*   HEMOGLOBIN  10.2*  10.0*  10.9*   HEMATOCRIT  30.7*  30.9*  34.9*   MCV  89.2  88.8  90.2   MCH  29.7  28.7  28.2   MCHC  33.2*  32.4*  31.2*   RDW  46.5  48.7  48.6   PLATELETCT  209  188  223   MPV  9.8  9.3  9.6     Recent Labs      11/18/17   0535  11/19/17   0510  11/20/17   0318   SODIUM  130*  134*  137   POTASSIUM  3.9  4.3  4.0   CHLORIDE  100  102  102   CO2  26  24  27   GLUCOSE  191*  156*  81   BUN  28*  43*  42*   CREATININE  1.16  1.28  1.26   CALCIUM  8.4*  8.2*  8.7                      Assessment/Plan     * COPD exacerbation (CMS-HCC)- (present on admission)   Assessment & Plan    Continue solumedrol  Weakness part as tolerated  Antibiotics        LINDSEY (acute kidney injury) (CMS-HCC)- (present on admission)    Assessment & Plan    Unknown if patient has chronic kidney disease  Creatinine has improved significantly  Cr trending down.         Hyponatremia- (present on admission)   Assessment & Plan    Improved slightly with fluids  Resolved.         Pneumonia- (present on admission)   Assessment & Plan    Unasyn/azithromycin  Await culture results  Continue at for now.         Chronic pulmonary embolism (CMS-[facility])- (present on admission)   Assessment & Plan    Continue xarelto        Acute on chronic respiratory failure with hypoxia and hypercapnia (CMS-HCC)- (present on admission)   Assessment & Plan    Baseline O2 is 4L by NC  Acute respiratory failure is due COPD exacerbation  Treat COPD  Better, still on 6L of o2.         Shock (CMS-[facility])- (present on admission)   Assessment & Plan    Blood pressures improved  Stable.             Reviewed items::  Labs reviewed, Radiology images reviewed and Medications reviewed  Muniz catheter::  Urinary Tract Retention or Urinary Tract Obstruction  Antibiotics:  Treating active infection/contamination beyond 24 hours perioperative coverage

## 2017-11-21 NOTE — PROGRESS NOTES
[facility] Hospitalist Progress Note    Date of Service: 2017    Chief Complaint  77 y.o. male admitted 11/15/2017 with respiratory failure with pneumonia    Interval Problem Update  PNA-doing better today, but still requiring decent amounts of oxygen.    Cellulitis of the legs-feels that they are improving, mildly painful.     Consultants/Specialty  CC physician    Disposition  SNF        Review of Systems   Constitutional: Negative for chills, fever and weight loss.   HENT: Negative for ear pain and tinnitus.    Eyes: Negative for blurred vision and double vision.   Respiratory: Positive for cough and shortness of breath. Negative for sputum production.    Cardiovascular: Positive for leg swelling. Negative for chest pain and palpitations.   Gastrointestinal: Negative for abdominal pain, nausea and vomiting.   Genitourinary: Negative for dysuria and urgency.   Musculoskeletal: Positive for joint pain. Negative for myalgias.   Skin: Negative for itching.   Neurological: Negative for tingling and headaches.   Endo/Heme/Allergies: Does not bruise/bleed easily.   Psychiatric/Behavioral: Negative for depression.   All other systems reviewed and are negative.     Physical Exam  Laboratory/Imaging   Hemodynamics  Temp (24hrs), Av.2 °C (98.9 °F), Min:36.8 °C (98.2 °F), Max:37.5 °C (99.5 °F)   Temperature: 37.5 °C (99.5 °F)  Pulse  Av.6  Min: 53  Max: 118    Blood Pressure : 130/71      Respiratory      Respiration: 20, Pulse Oximetry: 97 %, O2 Daily Delivery Respiratory : Silicone Nasal Cannula     Given By:: Mouthpiece, Work Of Breathing / Effort: Grunting;Moderate;Accessory Muscle Use  RUL Breath Sounds: Diminished, RML Breath Sounds: Diminished, RLL Breath Sounds: Diminished, LAKE Breath Sounds: Diminished, LLL Breath Sounds: Diminished    Fluids    Intake/Output Summary (Last 24 hours) at 17 1546  Last data filed at 17 0800   Gross per 24 hour   Intake              150 ml   Output              2475 ml   Net            -2325 ml       Nutrition  Orders Placed This Encounter   Procedures   • DIET ORDER     Standing Status:   Standing     Number of Occurrences:   1     Order Specific Question:   Diet:     Answer:   Full Liquid [11]     Order Specific Question:   Consistency/Fluid modifications:     Answer:   Nectar Thick [2]     Physical Exam   Constitutional: He is oriented to person, place, and time. No distress.   HENT:   Head: Normocephalic.   Mouth/Throat: No oropharyngeal exudate.   Eyes: Conjunctivae are normal. Right eye exhibits no discharge. Left eye exhibits no discharge.   Neck: Normal range of motion. Neck supple.   Cardiovascular: Normal rate, regular rhythm and normal heart sounds.    Pulmonary/Chest: Effort normal. No stridor. No respiratory distress. He has rales.    No apparent change today   Abdominal: Soft. Bowel sounds are normal. He exhibits no distension. There is no tenderness.   Obese   Musculoskeletal: Normal range of motion. He exhibits edema. He exhibits no tenderness.   Neurological: He is alert and oriented to person, place, and time. He exhibits normal muscle tone.   Skin:   Mild erythema of the B/L shin areas, mild denuding of skin appreciated   Psychiatric: He has a normal mood and affect.   Nursing note and vitals reviewed.      Recent Labs      11/19/17   0510  11/20/17   0318  11/21/17   0451   WBC  6.7  11.0*  11.0*   RBC  3.48*  3.87*  3.91*   HEMOGLOBIN  10.0*  10.9*  11.2*   HEMATOCRIT  30.9*  34.9*  35.4*   MCV  88.8  90.2  90.5   MCH  28.7  28.2  28.6   MCHC  32.4*  31.2*  31.6*   RDW  48.7  48.6  48.9   PLATELETCT  188  223  202   MPV  9.3  9.6  9.9     Recent Labs      11/19/17   0510  11/20/17   0318  11/21/17   0451   SODIUM  134*  137  137   POTASSIUM  4.3  4.0  4.0   CHLORIDE  102  102  102   CO2  24  27  29   GLUCOSE  156*  81  144*   BUN  43*  42*  41*   CREATININE  1.28  1.26  1.25   CALCIUM  8.2*  8.7  8.7                      Assessment/Plan     * COPD  exacerbation (CMS-HCC)- (present on admission)   Assessment & Plan    Continue solumedrol  Weakness part as tolerated  Antibiotics        LINDSEY (acute kidney injury) (CMS-HCC)- (present on admission)   Assessment & Plan    Unknown if patient has chronic kidney disease  -Continues to improve, good uop         Hyponatremia- (present on admission)   Assessment & Plan    -resolved        Pneumonia- (present on admission)   Assessment & Plan    Unasyn day#6, doing well, will finish 7 day course tomorrow        Chronic pulmonary embolism (CMS-HCC)- (present on admission)   Assessment & Plan    Continue xarelto        Acute on chronic respiratory failure with hypoxia and hypercapnia (CMS-HCC)- (present on admission)   Assessment & Plan    Baseline O2 is 4L by NC  Acute respiratory failure is due COPD exacerbation  Treat COPD  -slowly improving, pulmonary has signed off now        Paroxysmal atrial fibrillation (CMS-HCC)- (present on admission)   Assessment & Plan    -HR up to 150 last night  -started metoprolol 50 mg BID, watch pulm status as well  -already on xarelto, continue to monitor on tele, adjust PRN  -ECHo recently done, EF 65%        Shock (CMS-HCC)- (present on admission)   Assessment & Plan    Blood pressures improved  Stable.         Venous stasis ulcer (CMS-HCC)- (present on admission)   Assessment & Plan    -no clear e/o infection, removed dressings, appears ok at this time  -wound care to continue, monitor            Reviewed items::  Labs reviewed, Radiology images reviewed and Medications reviewed  Muniz catheter::  Urinary Tract Retention or Urinary Tract Obstruction  DVT: xarelto.  Antibiotics:  Treating active infection/contamination beyond 24 hours perioperative coverage

## 2017-11-21 NOTE — DISCHARGE PLANNING
"Transitional Care Navigator:    Spoke at length with the patient regarding discharge planning.  Pt is agreeable to skilled therapies, however wishes for this writer to speak with his daughter. Pt would frequently lose his train of thought, and stated several times \"I am getting dementia\".  He questioned this writer several times about his urinary catheter, and had difficulty articulating clearly.    Spoke at length with daughter Na, who related that her father has had an increase in his oxygen requirements over the past 6 months, from 2.5 liters to now 4 to 5 liters.  She added that her father has recently been to his PCP but did not think that he mentioned his increased oxygen demand.   She stated that [facility] [employer] would come to service the concentrator, but told her that the machine was fine. The concentrator has recently been replaced.  Daughter Magalie then joined the phone conversation, and confirmed everything stated previously by Na. Family is concerned that patient has not been receiving adequate oxygen, and wonder that this has contributed to his change in mental status.                    Both daughters are agreeable to skilled therapies.  Daughter Magalie gives choice for [facility]. Choice form completed and faxed; SW is aware.  "

## 2017-11-21 NOTE — DISCHARGE PLANNING
Received choice form from LUISA Arreola for SNF. Referral sent to [facility] at [phone].

## 2017-11-21 NOTE — CARE PLAN
Problem: Safety  Goal: Will remain free from injury  Outcome: PROGRESSING AS EXPECTED  Assessed strength and mobility.  Staff present for mobilization.      Problem: Bowel/Gastric:  Goal: Normal bowel function is maintained or improved  Outcome: PROGRESSING AS EXPECTED  Assessed bowel pattern.  Stool softeners given as ordered.

## 2017-11-21 NOTE — PROGRESS NOTES
At 2045, pt HR touching 150s, but not sustained, EKG done showed afib.  Dr Elizabeth Villalobos notified of pt's rhythm change, MD started pt on metoprolol.  Will continue to monitor and follow plan of care.

## 2017-11-21 NOTE — PROGRESS NOTES
Assumed pt care at 1900, bedside report received.  Pt in no distress, appears to be resting comfortably, voicing no complaints.  Bed locked in lowest position, call light within reach, and bed alarm on.  Will continue to monitor and follow plan of care.

## 2017-11-21 NOTE — RESPIRATORY CARE
COPD EDUCATION by COPD CLINICAL EDUCATOR  11/20/2017 at 4:46 PM by Anna Galeana     Patient reviewed by COPD education team. Patient does not qualify for COPD program.

## 2017-11-21 NOTE — ASSESSMENT & PLAN NOTE
-HR up to 150 last night  -started metoprolol 50 mg BID, watch pulm status as well  -already on xarelto, continue to monitor on tele, adjust PRN  -ECHo recently done, EF 65%

## 2017-11-21 NOTE — ASSESSMENT & PLAN NOTE
-no clear e/o infection, removed dressings, appears ok at this time  -wound care to continue, monitor

## 2017-11-21 NOTE — CARE PLAN
Problem: Safety  Goal: Will remain free from falls  Outcome: PROGRESSING AS EXPECTED  Glenda Puentes Fall Risk Assessment:     Last Known Fall: No falls  Mobility: Use of assistive device/requires assist of two people  Medications: Cardiovascular and central nervous system meds  Mental Status/LOC/Awareness: Awake, alert, and oriented to date, place, and person  Toileting Needs: Incontinence  Volume/Electrolyte Status: No problems  Communication/Sensory: Visual (Glasses)/hearing deficit  Behavior: Appropriate behavior  Glenda Puentes Fall Risk Total: 12  Fall Risk Level: MODERATE RISK    Universal Fall Precautions:  call light/belongings in reach, wheelchairs and assistive devices out of sight, bed in low position and locked, siderails up x 2, use non-slip footwear, clutter free and spill free environment, adequate lighting, educate to call for assistance, educate on level of risk    Fall Risk Level Interventions:    TRIAL (TELE 8, NEURO, MED ALAN 5) Moderate Fall Risk Interventions  Place yellow fall risk ID band on patient: verified  Provide patient/family education based on risk assessment : completed  Educate patient/family to call staff for assistance when getting out of bed: completed  Place fall precaution signage outside patient door: verified  Utilize bed/chair fall alarm: verified     Patient Specific Interventions:     Medication: review medications with patient and family and limit combination of prn medications  Mental Status/LOC/Awareness: reorient patient, reinforce falls education, check on patient hourly, utilize bed/chair fall alarm and reinforce the use of call light  Toileting: provide frquent toileting  Volume/Electrolyte Status: monitor blood sugars and maintain appropriate blood sugar levels if diabetic  Communication/Sensory: update plan of care on whiteboard  Behavioral: administer medication as ordered and instruct/reinforce fall program rationale  Mobility: utilize bed/chair fall alarm,  "ensure bed is locked and in lowest position and instruct patient to exit bed on their strongest side    Problem: Skin Integrity  Goal: Risk for impaired skin integrity will decrease  Outcome: PROGRESSING AS EXPECTED  Pt has been turned q2h as tolerated, pt a max heavy assist to turn    Problem: Pain Management  Goal: Pain level will decrease to patient's comfort goal    Intervention: Follow pain managment plan developed in collaboration with patient and Interdisciplinary Team  Pt complaining of pain and states \"it's always a 9 or 10, I deal with it.\"  Pt has scheduled pain meds around the clock, pt able to sleep and appears to be in no distress.        "

## 2017-11-21 NOTE — DISCHARGE PLANNING
"Pt has PASRR on file, #: [id number]   Note: Name on PASRR is First Name: \"R.b.\" Last name: \"Calzada\"  No middle name.   "

## 2017-11-22 ENCOUNTER — APPOINTMENT (OUTPATIENT)
Dept: RADIOLOGY | Facility: MEDICAL CENTER | Age: 77
DRG: 166 | End: 2017-11-22
Attending: INTERNAL MEDICINE
Payer: MEDICARE

## 2017-11-22 VITALS
OXYGEN SATURATION: 97 % | DIASTOLIC BLOOD PRESSURE: 60 MMHG | SYSTOLIC BLOOD PRESSURE: 115 MMHG | WEIGHT: 257.28 LBS | HEART RATE: 69 BPM | RESPIRATION RATE: 20 BRPM | TEMPERATURE: 99.1 F | BODY MASS INDEX: 38.11 KG/M2 | HEIGHT: 69 IN

## 2017-11-22 LAB
ANION GAP SERPL CALC-SCNC: 6 MMOL/L (ref 0–11.9)
BASOPHILS # BLD AUTO: 0.1 % (ref 0–1.8)
BASOPHILS # BLD: 0.01 K/UL (ref 0–0.12)
BUN SERPL-MCNC: 33 MG/DL (ref 8–22)
CALCIUM SERPL-MCNC: 8.7 MG/DL (ref 8.5–10.5)
CHLORIDE SERPL-SCNC: 101 MMOL/L (ref 96–112)
CO2 SERPL-SCNC: 31 MMOL/L (ref 20–33)
CREAT SERPL-MCNC: 1.23 MG/DL (ref 0.5–1.4)
EOSINOPHIL # BLD AUTO: 0.24 K/UL (ref 0–0.51)
EOSINOPHIL NFR BLD: 2.3 % (ref 0–6.9)
ERYTHROCYTE [DISTWIDTH] IN BLOOD BY AUTOMATED COUNT: 48.5 FL (ref 35.9–50)
GFR SERPL CREATININE-BSD FRML MDRD: 57 ML/MIN/1.73 M 2
GLUCOSE SERPL-MCNC: 115 MG/DL (ref 65–99)
HCT VFR BLD AUTO: 32.8 % (ref 42–52)
HGB BLD-MCNC: 10.3 G/DL (ref 14–18)
IMM GRANULOCYTES # BLD AUTO: 0.05 K/UL (ref 0–0.11)
IMM GRANULOCYTES NFR BLD AUTO: 0.5 % (ref 0–0.9)
LYMPHOCYTES # BLD AUTO: 1.39 K/UL (ref 1–4.8)
LYMPHOCYTES NFR BLD: 13.5 % (ref 22–41)
MCH RBC QN AUTO: 28.5 PG (ref 27–33)
MCHC RBC AUTO-ENTMCNC: 31.4 G/DL (ref 33.7–35.3)
MCV RBC AUTO: 90.9 FL (ref 81.4–97.8)
MONOCYTES # BLD AUTO: 1.35 K/UL (ref 0–0.85)
MONOCYTES NFR BLD AUTO: 13.1 % (ref 0–13.4)
NEUTROPHILS # BLD AUTO: 7.28 K/UL (ref 1.82–7.42)
NEUTROPHILS NFR BLD: 70.5 % (ref 44–72)
NRBC # BLD AUTO: 0 K/UL
NRBC BLD AUTO-RTO: 0 /100 WBC
PLATELET # BLD AUTO: 185 K/UL (ref 164–446)
PMV BLD AUTO: 9.8 FL (ref 9–12.9)
POTASSIUM SERPL-SCNC: 3.9 MMOL/L (ref 3.6–5.5)
RBC # BLD AUTO: 3.61 M/UL (ref 4.7–6.1)
SODIUM SERPL-SCNC: 138 MMOL/L (ref 135–145)
WBC # BLD AUTO: 10.3 K/UL (ref 4.8–10.8)

## 2017-11-22 PROCEDURE — 700102 HCHG RX REV CODE 250 W/ 637 OVERRIDE(OP): Performed by: INTERNAL MEDICINE

## 2017-11-22 PROCEDURE — 99239 HOSP IP/OBS DSCHRG MGMT >30: CPT | Performed by: INTERNAL MEDICINE

## 2017-11-22 PROCEDURE — 94640 AIRWAY INHALATION TREATMENT: CPT

## 2017-11-22 PROCEDURE — A9270 NON-COVERED ITEM OR SERVICE: HCPCS | Performed by: INTERNAL MEDICINE

## 2017-11-22 PROCEDURE — 700102 HCHG RX REV CODE 250 W/ 637 OVERRIDE(OP): Performed by: HOSPITALIST

## 2017-11-22 PROCEDURE — 700111 HCHG RX REV CODE 636 W/ 250 OVERRIDE (IP): Performed by: INTERNAL MEDICINE

## 2017-11-22 PROCEDURE — A9270 NON-COVERED ITEM OR SERVICE: HCPCS | Performed by: HOSPITALIST

## 2017-11-22 PROCEDURE — 94760 N-INVAS EAR/PLS OXIMETRY 1: CPT

## 2017-11-22 PROCEDURE — 80048 BASIC METABOLIC PNL TOTAL CA: CPT

## 2017-11-22 PROCEDURE — 85025 COMPLETE CBC W/AUTO DIFF WBC: CPT

## 2017-11-22 PROCEDURE — 92526 ORAL FUNCTION THERAPY: CPT

## 2017-11-22 PROCEDURE — 700101 HCHG RX REV CODE 250: Performed by: INTERNAL MEDICINE

## 2017-11-22 PROCEDURE — 36415 COLL VENOUS BLD VENIPUNCTURE: CPT

## 2017-11-22 RX ORDER — HYDROMORPHONE HYDROCHLORIDE 2 MG/ML
0.25 INJECTION, SOLUTION INTRAMUSCULAR; INTRAVENOUS; SUBCUTANEOUS
Status: DISCONTINUED | OUTPATIENT
Start: 2017-11-22 | End: 2017-11-22 | Stop reason: HOSPADM

## 2017-11-22 RX ORDER — GUAIFENESIN 600 MG/1
600 TABLET, EXTENDED RELEASE ORAL EVERY 12 HOURS
Status: DISCONTINUED | OUTPATIENT
Start: 2017-11-22 | End: 2017-11-22 | Stop reason: HOSPADM

## 2017-11-22 RX ORDER — TAMSULOSIN HYDROCHLORIDE 0.4 MG/1
0.4 CAPSULE ORAL
Qty: 30 CAP
Start: 2017-11-23 | End: 2017-12-30

## 2017-11-22 RX ORDER — LEVALBUTEROL INHALATION SOLUTION 1.25 MG/3ML
1.25 SOLUTION RESPIRATORY (INHALATION) EVERY 4 HOURS PRN
Qty: 24 ML
Start: 2017-11-22 | End: 2017-12-30

## 2017-11-22 RX ORDER — METOPROLOL TARTRATE 50 MG/1
50 TABLET, FILM COATED ORAL 2 TIMES DAILY
Qty: 60 TAB
Start: 2017-11-22 | End: 2017-12-30

## 2017-11-22 RX ADMIN — LEVALBUTEROL HYDROCHLORIDE 1.25 MG: 1.25 SOLUTION RESPIRATORY (INHALATION) at 14:46

## 2017-11-22 RX ADMIN — GUAIFENESIN 600 MG: 600 TABLET, EXTENDED RELEASE ORAL at 10:11

## 2017-11-22 RX ADMIN — IPRATROPIUM BROMIDE 0.5 MG: 0.5 SOLUTION RESPIRATORY (INHALATION) at 07:42

## 2017-11-22 RX ADMIN — ATORVASTATIN CALCIUM 20 MG: 20 TABLET, FILM COATED ORAL at 07:58

## 2017-11-22 RX ADMIN — FUROSEMIDE 20 MG: 10 INJECTION, SOLUTION INTRAMUSCULAR; INTRAVENOUS at 07:58

## 2017-11-22 RX ADMIN — OXYCODONE HYDROCHLORIDE 5 MG: 5 TABLET ORAL at 07:58

## 2017-11-22 RX ADMIN — ACETAMINOPHEN 650 MG: 325 TABLET, FILM COATED ORAL at 10:07

## 2017-11-22 RX ADMIN — IPRATROPIUM BROMIDE 0.5 MG: 0.5 SOLUTION RESPIRATORY (INHALATION) at 14:45

## 2017-11-22 RX ADMIN — OXYCODONE HYDROCHLORIDE 10 MG: 10 TABLET ORAL at 05:28

## 2017-11-22 RX ADMIN — OXYCODONE HYDROCHLORIDE 10 MG: 10 TABLET ORAL at 15:10

## 2017-11-22 RX ADMIN — METOPROLOL TARTRATE 50 MG: 50 TABLET, FILM COATED ORAL at 07:58

## 2017-11-22 RX ADMIN — TAMSULOSIN HYDROCHLORIDE 0.4 MG: 0.4 CAPSULE ORAL at 07:58

## 2017-11-22 ASSESSMENT — PAIN SCALES - GENERAL
PAINLEVEL_OUTOF10: 8
PAINLEVEL_OUTOF10: 2
PAINLEVEL_OUTOF10: 8
PAINLEVEL_OUTOF10: 4
PAINLEVEL_OUTOF10: 5

## 2017-11-22 NOTE — DISCHARGE INSTRUCTIONS
Discharge Instructions    Discharged to other by medical transportation with escort. Discharged via wheelchair, hospital escort: Yes.  Special equipment needed: Not Applicable    Be sure to schedule a follow-up appointment with your primary care doctor or any specialists as instructed.     Discharge Plan:   Diet Plan: Discussed  Activity Level: Discussed  Confirmed Follow up Appointment: No (Comments)  Confirmed Symptoms Management: Discussed  Medication Reconciliation Updated: Yes  Pneumococcal Vaccine Given - only chart on this line when given: Given (See MAR)  Influenza Vaccine Indication: Not indicated: Previously immunized this influenza season and > 8 years of age  Influenza Vaccine Given - only chart on this line when given:  (Not indicated)    I understand that a diet low in cholesterol, fat, and sodium is recommended for good health. Unless I have been given specific instructions below for another diet, I accept this instruction as my diet prescription.   Other diet: Low sodium, low fat, well-balanced diet    Special Instructions: None    · Is patient discharged on Warfarin / Coumadin?   No     · Is patient Post Blood Transfusion?  No    Depression / Suicide Risk    As you are discharged from this [facility] facility, it is important to learn how to keep safe from harming yourself.    Recognize the warning signs:  · Abrupt changes in personality, positive or negative- including increase in energy   · Giving away possessions  · Change in eating patterns- significant weight changes-  positive or negative  · Change in sleeping patterns- unable to sleep or sleeping all the time   · Unwillingness or inability to communicate  · Depression  · Unusual sadness, discouragement and loneliness  · Talk of wanting to die  · Neglect of personal appearance   · Rebelliousness- reckless behavior  · Withdrawal from people/activities they love  · Confusion- inability to concentrate     If you or a loved one observes any of  these behaviors or has concerns about self-harm, here's what you can do:  · Talk about it- your feelings and reasons for harming yourself  · Remove any means that you might use to hurt yourself (examples: pills, rope, extension cords, firearm)  · Get professional help from the community (Mental Health, Substance Abuse, psychological counseling)  · Do not be alone:Call your Safe Contact- someone whom you trust who will be there for you.  · Call your local CRISIS HOTLINE 742-8827 or 076-920-9379  · Call your local Children's Mobile Crisis Response Team [place] (576) 597-0525 or www."[employer]  · Call the toll free National Suicide Prevention Hotlines   · National Suicide Prevention Lifeline 378-160-TQTZ (6670)  · [employer] Line Network 800-SUICIDE (566-2596)    Pneumonia, Adult  Pneumonia is an infection of the lungs.   CAUSES  Pneumonia may be caused by bacteria or a virus. Usually, the infection is caused by breathing in droplets from an infected person's cough or sneeze.   SYMPTOMS   Symptoms of pneumonia include:  · Cough.  · Fever.  · Chest pain.  · Rapid breathing.  · Shortness of breath.  · Shaking chills.  · Mucus production.  DIAGNOSIS   If you have the common symptoms of pneumonia, often your health care provider will confirm the diagnosis with a chest X-ray. The X-ray will show an abnormality in the lung if you have pneumonia. Other tests may be done on your blood, urine, or mucus (sputum) to find the specific cause of your pneumonia. A blood gas test or pulse oximetry test may be needed to check how well your lungs are working.  TREATMENT   Your treatment will depend on whether your pneumonia is caused by bacteria or a virus.   · Bacterial pneumonia is treated with antibiotic medicine.  · Pneumonia that is caused by the influenza virus may be treated with an antiviral medicine.  · Pneumonia that is caused by a virus other than influenza will not respond to antibiotic medicine. This type of  pneumonia will have to run its course.   HOME CARE INSTRUCTIONS   · Cough suppressants may be used if you are losing too much rest from coughing at night. However, you should try to avoid taking cough suppresants. This is because coughing helps to remove mucus from your lungs.  · Sleep in a semi-upright position at night. Try sleeping in a reclining chair, or place a few pillows under your head.  · Try using a cold steam vaporizer or humidifier in your home or bedroom. This may help loosen your mucus.  · If you were prescribed an antibiotic medicine, finish all of it even if you start to feel better.  · If you were prescribed an expectorant, take it as directed by your health care provider. This medicine loosens the mucus so you can cough it up.  · Take medicines only as directed by your health care provider.  · Do not smoke. If you are a smoker and continue to smoke, your cough may last several weeks after your pneumonia has cleared.  · Get rest when you feel tired, or as needed.  PREVENTION  A pneumococcal shot (vaccine) is available to prevent a common bacterial cause of pneumonia. This is usually suggested for:  · People over 65 years old.  · People on chemotherapy.  · People with chronic lung problems, such as bronchitis or emphysema.  · People with immune system problems.  If you are over 65 years old or have a high risk condition, you may receive the pneumococcal vaccine if you have not received it before. In some countries, a routine influenza vaccine is also recommended. This vaccine can help prevent some cases of pneumonia. You may be offered the influenza vaccine as part of your care.  If you are a smoker, it is time to quit in order to prevent pneumonia in the future. You may receive instructions on how to stop smoking. Your health care provider can provide medicines and counseling to help you quit.  SEEK MEDICAL CARE IF:  · You have a fever.  · You cannot control your cough with suppressants at night,  and you keep losing sleep.  SEEK IMMEDIATE MEDICAL CARE IF:   · You have worsening shortness of breath.  · You have increased chest pain.  · Your sickness becomes worse, especially if you are an older adult or have a weakened immune system.  · You cough up blood.  · You have pain that is getting worse or is not controlled with medicines.  · Your symptoms are getting worse rather than better.     This information is not intended to replace advice given to you by your health care provider. Make sure you discuss any questions you have with your health care provider.     Document Released: 12/18/2006 Document Revised: 01/08/2016 Document Reviewed: 04/13/2016  [employer] Interactive Patient Education ©2016 [employer]      Chronic Obstructive Pulmonary Disease  Chronic obstructive pulmonary disease (COPD) is a common lung problem. In COPD, the flow of air from the lungs is limited. The way your lungs work will probably never return to normal, but there are things you can do to improve your lungs and make yourself feel better. Your doctor may treat your condition with:  · Medicines.  · Oxygen.  · Lung surgery.  · Changes to your diet.  · Rehabilitation. This may involve a team of specialists.  HOME CARE  · Take all medicines as told by your doctor.  · Avoid medicines or cough syrups that dry up your airway (such as antihistamines) and do not allow you to get rid of thick spit. You do not need to avoid them if told differently by your doctor.  · If you smoke, stop. Smoking makes the problem worse.  · Avoid being around things that make your breathing worse (like smoke, chemicals, and fumes).  · Use oxygen therapy and therapy to help improve your lungs (pulmonary rehabilitation) if told by your doctor. If you need home oxygen therapy, ask your doctor if you should buy a tool to measure your oxygen level (oximeter).  · Avoid people who have a sickness you can catch (contagious).  · Avoid going outside when it is very hot,  cold, or humid.  · Eat healthy foods. Eat smaller meals more often. Rest before meals.  · Stay active, but remember to also rest.  · Make sure to get all the shots (vaccines) your doctor recommends. Ask your doctor if you need a pneumonia shot.  · Learn and use tips on how to relax.  · Learn and use tips on how to control your breathing as told by your doctor. Try:  ¨ Breathing in (inhaling) through your nose for 1 second. Then, pucker your lips and breath out (exhale) through your lips for 2 seconds.  ¨ Putting one hand on your belly (abdomen). Breathe in slowly through your nose for 1 second. Your hand on your belly should move out. Pucker your lips and breathe out slowly through your lips. Your hand on your belly should move in as you breathe out.  · Learn and use controlled coughing to clear thick spit from your lungs. The steps are:  2. Lean your head a little forward.  3. Breathe in deeply.  4. Try to hold your breath for 3 seconds.  5. Keep your mouth slightly open while coughing 2 times.  6. Spit any thick spit out into a tissue.  7. Rest and do the steps again 1 or 2 times as needed.  GET HELP IF:  · You cough up more thick spit than usual.  · There is a change in the color or thickness of the spit.  · It is harder to breathe than usual.  · Your breathing is faster than usual.  GET HELP RIGHT AWAY IF:  · You have shortness of breath while resting.  · You have shortness of breath that stops you from:  ¨ Being able to talk.  ¨ Doing normal activities.  · You chest hurts for longer than 5 minutes.  · Your skin color is more blue than usual.  · Your pulse oximeter shows that you have low oxygen for longer than 5 minutes.  MAKE SURE YOU:  · Understand these instructions.  · Will watch your condition.  · Will get help right away if you are not doing well or get worse.     This information is not intended to replace advice given to you by your health care provider. Make sure you discuss any questions you have with  your health care provider.     Document Released: 06/05/2009 Document Revised: 01/08/2016 Document Reviewed: 08/14/2014  [employer] Interactive Patient Education ©2016 [employer]

## 2017-11-22 NOTE — DISCHARGE SUMMARY
CHIEF COMPLAINT ON ADMISSION  Chief Complaint   Patient presents with   • ALOC       CODE STATUS  DNR    HPI & HOSPITAL COURSE  This is a 77 y.o. male who was admitted on 11/5/17 after presenting to ER with dyspnea and also ALOC. He was found to be hypoxic an ABG was done showing pH of 7.16, pCO2 79.7and pt was intubated in ER. Most likely cause of his acute on chronic respiratory failure was due to COPD exacerbation and possible pneumonia. He was started on steroids and also on ceftriaxone and azithro for possible community acquired pneumonia. He also had an LINDSEY on admission.  Pt also became hypotensive requiring pressors during his stay in critical care unit. A bronchoscopy was also performed showing some thick yellow secretion on the left lower lobe and definite endobronchial tumor identified.   Pt symptoms continued to improve he was extubated on 11/19/17. He finished abx course for his pneumonia.  Pt breathing has improved he is now back to his baseline of 4 L of O2 requirement    His hyponatremia, ALOC  and LINDSEY also resolved.   Pt will be discharged to skilled today.         DISCHARGE PROBLEM LIST      COPD exacerbation (CMS-[facility]) POA: Yes    Acute on chronic respiratory failure with hypoxia and hypercapnia (CMS-HCC) POA: Yes    Chronic pulmonary embolism (CMS-[facility]) POA: Yes    Pneumonia POA: Yes    Hyponatremia POA: Yes    LINDSEY (acute kidney injury) (CMS-[facility]) POA: Yes    Venous stasis ulcer (CMS-HCC) POA: Yes    Shock (CMS-HCC) POA: Yes    Paroxysmal atrial fibrillation (CMS-[facility]) POA: Yes        FOLLOW UP  No future appointments.  Primary Care Provider  [facility]  Schedule an appointment as soon as possible for a visit in 1 week  Hospital follow-up appointment with PCP    Radha Carter [facility] ([facility])  [address] [facility] [address] [place] [address]  [phone]          MEDICATIONS ON DISCHARGE   MARSHAL Calzada   Home Medication Instructions RUBEN:[id number]    Printed on:11/22/17 1202   Medication Information                       atorvastatin (LIPITOR) 20 MG Tab  Take 20 mg by mouth every day.             furosemide (LASIX) 20 MG Tab  Take 20 mg by mouth every day.             gabapentin (NEURONTIN) 300 MG Cap  Take 300 mg by mouth every bedtime.             ipratropium (ATROVENT) 0.02 % Solution  2.5 mL by Nebulization route every 6 hours.             levalbuterol (XOPENEX) 1.25 MG/3ML Nebu Soln  3 mL by Nebulization route every four hours as needed for Shortness of Breath.             lisinopril (PRINIVIL) 20 MG Tab  Take 20 mg by mouth every bedtime.             metoprolol (LOPRESSOR) 50 MG Tab  Take 1 Tab by mouth 2 Times a Day.             morphine (MS IR) 15 MG tablet  Take 15 mg by mouth every 8 hours as needed for Severe Pain.             rivaroxaban (XARELTO) 15 MG Tab tablet  Take 15 mg by mouth with dinner.             tamsulosin (FLOMAX) 0.4 MG capsule  Take 1 Cap by mouth ONE-HALF HOUR AFTER BREAKFAST.                 DIET  Orders Placed This Encounter   Procedures   • DIET ORDER     Standing Status:   Standing     Number of Occurrences:   1     Order Specific Question:   Diet:     Answer:   Full Liquid [11]     Order Specific Question:   Consistency/Fluid modifications:     Answer:   Nectar Thick [2]       ACTIVITY  As tolerated.        CONSULTATIONS  Pulmonology    PROCEDURES  Bronchoscopy     LABORATORY  Lab Results   Component Value Date/Time    SODIUM 138 11/22/2017 05:02 AM    POTASSIUM 3.9 11/22/2017 05:02 AM    CHLORIDE 101 11/22/2017 05:02 AM    CO2 31 11/22/2017 05:02 AM    GLUCOSE 115 (H) 11/22/2017 05:02 AM    BUN 33 (H) 11/22/2017 05:02 AM    CREATININE 1.23 11/22/2017 05:02 AM    CREATININE 1.1 09/27/2005 04:15 AM        Lab Results   Component Value Date/Time    WBC 10.3 11/22/2017 05:02 AM    HEMOGLOBIN 10.3 (L) 11/22/2017 05:02 AM    HEMATOCRIT 32.8 (L) 11/22/2017 05:02 AM    PLATELETCT 185 11/22/2017 05:02 AM        Total time of the discharge process exceeds 45 minutes

## 2017-11-22 NOTE — DISCHARGE PLANNING
Received transport form from [facility] to [facility]. Contacted [facility] spoke to Ruby, transportation arranged for pickup at 1530 via [employer]. Notified [facility].

## 2017-11-22 NOTE — PROGRESS NOTES
Received report from day shift nurse at bedside. Patient is A&O X 3, sitting up in bed in no distress. Safety measures in place bed is locked and at lowest position, bed alarm active and working, call light within reach and patient calls accordingly. Hourly rounding in place and will continue to monitor the patient.

## 2017-11-22 NOTE — THERAPY
"Speech Language Therapy dysphagia treatment completed.   Functional Status: Tolerating NTFL diet but with icreased WOB at baseline prohibiting diet advancement.   Recommendations: Change diet to dys1/ntl as pt may not advance quickly.   Plan of Care: Will benefit from Speech Therapy 3 times per week  Post-Acute Therapy: Discharge to a transitional care facility for continued skilled therapy services.    See \"Rehab Therapy-Acute\" Patient Summary Report for complete documentation.     "

## 2017-11-22 NOTE — CARE PLAN
Problem: Safety  Goal: Will remain free from injury  Patient educated on importance of using the call light if in need of assistance. Patient verbalizes understanding. Bed locked in lowest position, non skid socks on, personal belongings and call light within reach, appropriate sign placed on door.    Problem: Mobility  Goal: Risk for activity intolerance will decrease  Patient up x 3 assist to chair x 2 today

## 2017-11-22 NOTE — DISCHARGE PLANNING
Per MD Dudley, pt cleared to go to [facility].    SW faxed transport communication form to [facility], requesting [phone] transport.

## 2017-11-22 NOTE — DISCHARGE PLANNING
SW met with pt and pt's daughter, Araceli at bedside. Pt gave verbal consent for [facility] to share med info/records with [facility] and he signed 2nd IMM.     Pt's other daughter called RN and had questions about SNF days/coverage. SW gave her, Bed Day RN Michelle's number.     MADELIN Vanessa notified SW 1530 pickup arranged. SW notified RN of  time.

## 2017-11-22 NOTE — DISCHARGE PLANNING
Received call from Esther Hewitt with [facility] transport time is changed to 1500. Faxed DC summary to [phone]

## 2017-11-22 NOTE — PROGRESS NOTES
Patient given discharge information, prescriptions, and follow-up care instructions. Patient voices no questions or concerns regarding discharge instructions, and has all appropriate equipment necessary. IV and tele monitor removed. Patient transported to [facility] via [employer] wheelchair.

## 2017-11-22 NOTE — PROGRESS NOTES
Glenda Puentes Fall Risk Assessment:     Last Known Fall: No falls  Mobility: Use of assistive device/requires assist of two people  Medications: Cardiovascular and central nervous system meds  Mental Status/LOC/Awareness: Awake, alert, and oriented to date, place, and person  Toileting Needs: Use of catheters or diversion devices  Volume/Electrolyte Status: No problems  Communication/Sensory: Visual (Glasses)/hearing deficit  Behavior: Depression/anxiety  Glenda Puentes Fall Risk Total: 11  Fall Risk Level: MODERATE RISK    Universal Fall Precautions:  call light/belongings in reach, bed in low position and locked, wheelchairs and assistive devices out of sight, siderails up x 2, use non-slip footwear, adequate lighting, clutter free and spill free environment, educate on level of risk, educate to call for assistance    Fall Risk Level Interventions:    TRIAL (TELE 8, NEURO, MED ALAN 5) Moderate Fall Risk Interventions  Place yellow fall risk ID band on patient: verified  Provide patient/family education based on risk assessment : completed  Educate patient/family to call staff for assistance when getting out of bed: completed  Place fall precaution signage outside patient door: verified  Utilize bed/chair fall alarm: verified     Patient Specific Interventions:     Medication: review medications with patient and family and limit combination of prn medications  Mental Status/LOC/Awareness: reorient patient, reinforce falls education, check on patient hourly, utilize bed/chair fall alarm and reinforce the use of call light  Toileting: provide frquent toileting and monitor intake and output/use of appropriate interventions  Volume/Electrolyte Status: ensure patient remains hydrated and monitor abnormal lab values  Communication/Sensory: update plan of care on whiteboard and ensure patient has glasses/contacts and hearing aids/dentures  Behavioral: administer medication as ordered and instruct/reinforce fall program  rationale  Mobility: dangle prior to standing, utilize bed/chair fall alarm and ensure bed is locked and in lowest position

## 2017-11-22 NOTE — CARE PLAN
Problem: Infection  Goal: Will remain free from infection  Outcome: PROGRESSING AS EXPECTED  Educated patient regarding infection prevention measures such as hand hygiene and curtis care, patient verbalized understanding.

## 2017-11-22 NOTE — FLOWSHEET NOTE
11/21/17 1942   Events/Summary/Plan   Events/Summary/Plan SVN   SVN Group   #SVN Performed Yes   Given By: Mouthpiece   Respiratory WDL   Respiratory (WDL) X   Chest Exam   Work Of Breathing / Effort Grunting;Moderate;Accessory Muscle Use   Respiration (!) 21   Pulse 65   Breath Sounds   Pre/Post Intervention Pre Intervention Assessment   RUL Breath Sounds Diminished   RML Breath Sounds Diminished   RLL Breath Sounds Diminished   LAKE Breath Sounds Diminished   LLL Breath Sounds Diminished   Oximetry   Continuous Oximetry Yes   O2 Alarms Set & Reviewed Yes   Oxygen   Pulse Oximetry 90 %   O2 (LPM) 5   O2 Daily Delivery Respiratory  Silicone Nasal Cannula

## 2017-11-22 NOTE — PROGRESS NOTES
Received report from nightshift RN, assumed care of patient. Patient is A&O x 4, bed alarm on. Patient educated on importance of calling if in need of assistance. Verbalizes understanding. Patient experiencing 8/10 pain at this time. Patient updated on plan of care, voices no concerns at this time. Will continue to monitor for safety and comfort.

## 2017-11-22 NOTE — PROGRESS NOTES
Glenda Puentes Fall Risk Assessment:     Last Known Fall: No falls  Mobility: Use of assistive device/requires assist of two people  Medications: Diuretics, Cardiovascular and central nervous system meds  Mental Status/LOC/Awareness: Awake, alert, and oriented to date, place, and person  Toileting Needs: Use of assistive device (Bedside commode, bedpan, urinal)  Volume/Electrolyte Status: No problems  Communication/Sensory: Neuropathy  Behavior: Appropriate behavior  Glenda Puentes Fall Risk Total: 16  Fall Risk Level: HIGH RISK    Universal Fall Precautions:  call light/belongings in reach, bed in low position and locked, wheelchairs and assistive devices out of sight, siderails up x 2, use non-slip footwear, adequate lighting, educate to call for assistance, educate on level of risk, clutter free and spill free environment    Fall Risk Level Interventions:    TRIAL (TELE 8, NEURO, MED ALAN 5) Moderate Fall Risk Interventions  Place yellow fall risk ID band on patient: verified  Provide patient/family education based on risk assessment : completed  Educate patient/family to call staff for assistance when getting out of bed: completed  Place fall precaution signage outside patient door: verified  Utilize bed/chair fall alarm: verifiedTRIAL (TELE 8, NEURO, [email or web address] ALAN 5) High Fall Risk Interventions  Place yellow fall risk ID band on patient: verified  Provide patient/family education based on risk assessment: completed  Educate patient/family to call staff for assistance when getting out of bed: completed  Place fall precaution signage outside patient door: verified  Place patient in room close to nursing station: verified  Utilize bed/chair fall alarm: verified  Notify charge of high risk for huddle: completed    Patient Specific Interventions:     Medication: review medications with patient and family and limit combination of prn medications  Mental Status/LOC/Awareness: reinforce falls education, check on patient  hourly, utilize bed/chair fall alarm, reinforce the use of call light and provide activity  Toileting: instruct patient/family on the need to call for assistance when toileting and do not leave patient unattended in bathroom/refer to toileting scripting  Volume/Electrolyte Status: ensure patient remains hydrated and monitor abnormal lab values  Communication/Sensory: update plan of care on whiteboard  Behavioral: not applicable  Mobility: utilize bed/chair fall alarm, ensure bed is locked and in lowest position, provide appropriate assistive device and instruct patient to exit bed on their strongest side

## 2017-12-13 LAB
FUNGUS SPEC CULT: ABNORMAL
FUNGUS SPEC CULT: ABNORMAL
SIGNIFICANT IND 70042: ABNORMAL
SITE SITE: ABNORMAL
SOURCE SOURCE: ABNORMAL

## 2017-12-30 ENCOUNTER — APPOINTMENT (OUTPATIENT)
Dept: RADIOLOGY | Facility: MEDICAL CENTER | Age: 77
DRG: 189 | End: 2017-12-30
Attending: EMERGENCY MEDICINE
Payer: MEDICARE

## 2017-12-30 ENCOUNTER — RESOLUTE PROFESSIONAL BILLING HOSPITAL PROF FEE (OUTPATIENT)
Dept: HOSPITALIST | Facility: MEDICAL CENTER | Age: 77
End: 2017-12-30
Payer: COMMERCIAL

## 2017-12-30 ENCOUNTER — HOSPITAL ENCOUNTER (INPATIENT)
Facility: MEDICAL CENTER | Age: 77
LOS: 12 days | DRG: 189 | End: 2018-01-11
Attending: EMERGENCY MEDICINE | Admitting: INTERNAL MEDICINE
Payer: MEDICARE

## 2017-12-30 DIAGNOSIS — J44.0 COPD (CHRONIC OBSTRUCTIVE PULMONARY DISEASE) WITH ACUTE BRONCHITIS (HCC): ICD-10-CM

## 2017-12-30 DIAGNOSIS — N28.9 RENAL INSUFFICIENCY: ICD-10-CM

## 2017-12-30 DIAGNOSIS — J44.1 COPD WITH EXACERBATION (HCC): ICD-10-CM

## 2017-12-30 DIAGNOSIS — I48.0 PAROXYSMAL ATRIAL FIBRILLATION (HCC): ICD-10-CM

## 2017-12-30 DIAGNOSIS — N17.9 AKI (ACUTE KIDNEY INJURY) (HCC): ICD-10-CM

## 2017-12-30 DIAGNOSIS — J20.9 COPD (CHRONIC OBSTRUCTIVE PULMONARY DISEASE) WITH ACUTE BRONCHITIS (HCC): ICD-10-CM

## 2017-12-30 DIAGNOSIS — R41.0 DISORIENTATION: ICD-10-CM

## 2017-12-30 DIAGNOSIS — J44.1 COPD EXACERBATION (HCC): ICD-10-CM

## 2017-12-30 DIAGNOSIS — J96.22 ACUTE ON CHRONIC RESPIRATORY FAILURE WITH HYPOXIA AND HYPERCAPNIA (HCC): ICD-10-CM

## 2017-12-30 DIAGNOSIS — J96.21 ACUTE ON CHRONIC RESPIRATORY FAILURE WITH HYPOXIA AND HYPERCAPNIA (HCC): ICD-10-CM

## 2017-12-30 LAB
ALBUMIN SERPL BCP-MCNC: 3.4 G/DL (ref 3.2–4.9)
ALBUMIN/GLOB SERPL: 1 G/DL
ALP SERPL-CCNC: 74 U/L (ref 30–99)
ALT SERPL-CCNC: 8 U/L (ref 2–50)
AMMONIA PLAS-SCNC: 33 UMOL/L (ref 11–45)
AMPHET UR QL SCN: NEGATIVE
ANION GAP SERPL CALC-SCNC: 6 MMOL/L (ref 0–11.9)
ANISOCYTOSIS BLD QL SMEAR: ABNORMAL
APPEARANCE UR: CLEAR
APTT PPP: 31.3 SEC (ref 24.7–36)
AST SERPL-CCNC: 15 U/L (ref 12–45)
BARBITURATES UR QL SCN: NEGATIVE
BASE EXCESS BLDA CALC-SCNC: 3 MMOL/L (ref -4–3)
BASOPHILS # BLD AUTO: 0.4 % (ref 0–1.8)
BASOPHILS # BLD: 0.05 K/UL (ref 0–0.12)
BENZODIAZ UR QL SCN: NEGATIVE
BILIRUB SERPL-MCNC: 0.7 MG/DL (ref 0.1–1.5)
BILIRUB UR QL STRIP.AUTO: NEGATIVE
BNP SERPL-MCNC: 50 PG/ML (ref 0–100)
BODY TEMPERATURE: ABNORMAL CENTIGRADE
BUN SERPL-MCNC: 22 MG/DL (ref 8–22)
BZE UR QL SCN: NEGATIVE
CALCIUM SERPL-MCNC: 9.2 MG/DL (ref 8.5–10.5)
CANNABINOIDS UR QL SCN: NEGATIVE
CHLORIDE SERPL-SCNC: 97 MMOL/L (ref 96–112)
CHLORIDE UR-SCNC: 62 MMOL/L
CO2 SERPL-SCNC: 31 MMOL/L (ref 20–33)
COLOR UR: YELLOW
COMMENT 1642: NORMAL
CREAT SERPL-MCNC: 2.13 MG/DL (ref 0.5–1.4)
EKG IMPRESSION: NORMAL
EKG IMPRESSION: NORMAL
EOSINOPHIL # BLD AUTO: 0.25 K/UL (ref 0–0.51)
EOSINOPHIL NFR BLD: 2 % (ref 0–6.9)
ERYTHROCYTE [DISTWIDTH] IN BLOOD BY AUTOMATED COUNT: 53 FL (ref 35.9–50)
FERRITIN SERPL-MCNC: 111.2 NG/ML (ref 22–322)
FLUAV RNA SPEC QL NAA+PROBE: NEGATIVE
FLUBV RNA SPEC QL NAA+PROBE: NEGATIVE
FOLATE SERPL-MCNC: 15 NG/ML
GFR SERPL CREATININE-BSD FRML MDRD: 30 ML/MIN/1.73 M 2
GIANT PLATELETS BLD QL SMEAR: NORMAL
GLOBULIN SER CALC-MCNC: 3.3 G/DL (ref 1.9–3.5)
GLUCOSE SERPL-MCNC: 118 MG/DL (ref 65–99)
GLUCOSE UR STRIP.AUTO-MCNC: NEGATIVE MG/DL
HCO3 BLDA-SCNC: 31 MMOL/L (ref 17–25)
HCT VFR BLD AUTO: 34.8 % (ref 42–52)
HGB BLD-MCNC: 10.4 G/DL (ref 14–18)
IMM GRANULOCYTES # BLD AUTO: 0.04 K/UL (ref 0–0.11)
IMM GRANULOCYTES NFR BLD AUTO: 0.3 % (ref 0–0.9)
INHALED O2 FLOW RATE: 5 L/MIN (ref 2–10)
INR PPP: 1.34 (ref 0.87–1.13)
IRON SATN MFR SERPL: 5 % (ref 15–55)
IRON SERPL-MCNC: 14 UG/DL (ref 50–180)
KETONES UR STRIP.AUTO-MCNC: NEGATIVE MG/DL
LACTATE BLD-SCNC: 1.6 MMOL/L (ref 0.5–2)
LEUKOCYTE ESTERASE UR QL STRIP.AUTO: NEGATIVE
LIPASE SERPL-CCNC: 3 U/L (ref 11–82)
LYMPHOCYTES # BLD AUTO: 1.55 K/UL (ref 1–4.8)
LYMPHOCYTES NFR BLD: 12.1 % (ref 22–41)
MAGNESIUM SERPL-MCNC: 1.9 MG/DL (ref 1.5–2.5)
MCH RBC QN AUTO: 28.3 PG (ref 27–33)
MCHC RBC AUTO-ENTMCNC: 29.9 G/DL (ref 33.7–35.3)
MCV RBC AUTO: 94.6 FL (ref 81.4–97.8)
METHADONE UR QL SCN: NEGATIVE
MICRO URNS: NORMAL
MICROCYTES BLD QL SMEAR: ABNORMAL
MONOCYTES # BLD AUTO: 0.79 K/UL (ref 0–0.85)
MONOCYTES NFR BLD AUTO: 6.2 % (ref 0–13.4)
MORPHOLOGY BLD-IMP: NORMAL
NEUTROPHILS # BLD AUTO: 10.09 K/UL (ref 1.82–7.42)
NEUTROPHILS NFR BLD: 79 % (ref 44–72)
NITRITE UR QL STRIP.AUTO: NEGATIVE
NRBC # BLD AUTO: 0 K/UL
NRBC BLD-RTO: 0 /100 WBC
OPIATES UR QL SCN: POSITIVE
OVALOCYTES BLD QL SMEAR: NORMAL
OXYCODONE UR QL SCN: NEGATIVE
PCO2 BLDA: 69.8 MMHG (ref 26–37)
PCP UR QL SCN: NEGATIVE
PH BLDA: 7.27 [PH] (ref 7.4–7.5)
PH UR STRIP.AUTO: 5 [PH]
PLATELET # BLD AUTO: 264 K/UL (ref 164–446)
PMV BLD AUTO: 8.4 FL (ref 9–12.9)
PO2 BLDA: 73.3 MMHG (ref 64–87)
POTASSIUM SERPL-SCNC: 4.7 MMOL/L (ref 3.6–5.5)
PROPOXYPH UR QL SCN: NEGATIVE
PROT SERPL-MCNC: 6.7 G/DL (ref 6–8.2)
PROT UR QL STRIP: NEGATIVE MG/DL
PROTHROMBIN TIME: 16.3 SEC (ref 12–14.6)
RBC # BLD AUTO: 3.68 M/UL (ref 4.7–6.1)
RBC BLD AUTO: PRESENT
RBC UR QL AUTO: NEGATIVE
SAO2 % BLDA: 93.5 % (ref 93–99)
SODIUM SERPL-SCNC: 134 MMOL/L (ref 135–145)
SODIUM UR-SCNC: 54 MMOL/L
SP GR UR STRIP.AUTO: 1.01
TIBC SERPL-MCNC: 288 UG/DL (ref 250–450)
TROPONIN I SERPL-MCNC: 0.03 NG/ML (ref 0–0.04)
UROBILINOGEN UR STRIP.AUTO-MCNC: 0.2 MG/DL
VIT B12 SERPL-MCNC: 397 PG/ML (ref 211–911)
WBC # BLD AUTO: 12.8 K/UL (ref 4.8–10.8)

## 2017-12-30 PROCEDURE — 87502 INFLUENZA DNA AMP PROBE: CPT

## 2017-12-30 PROCEDURE — 96361 HYDRATE IV INFUSION ADD-ON: CPT

## 2017-12-30 PROCEDURE — 700102 HCHG RX REV CODE 250 W/ 637 OVERRIDE(OP): Performed by: EMERGENCY MEDICINE

## 2017-12-30 PROCEDURE — 83036 HEMOGLOBIN GLYCOSYLATED A1C: CPT

## 2017-12-30 PROCEDURE — 82746 ASSAY OF FOLIC ACID SERUM: CPT

## 2017-12-30 PROCEDURE — 84300 ASSAY OF URINE SODIUM: CPT

## 2017-12-30 PROCEDURE — 96374 THER/PROPH/DIAG INJ IV PUSH: CPT

## 2017-12-30 PROCEDURE — 87086 URINE CULTURE/COLONY COUNT: CPT

## 2017-12-30 PROCEDURE — 85025 COMPLETE CBC W/AUTO DIFF WBC: CPT

## 2017-12-30 PROCEDURE — 83550 IRON BINDING TEST: CPT

## 2017-12-30 PROCEDURE — 82570 ASSAY OF URINE CREATININE: CPT

## 2017-12-30 PROCEDURE — 85610 PROTHROMBIN TIME: CPT

## 2017-12-30 PROCEDURE — 36415 COLL VENOUS BLD VENIPUNCTURE: CPT

## 2017-12-30 PROCEDURE — A9270 NON-COVERED ITEM OR SERVICE: HCPCS | Performed by: EMERGENCY MEDICINE

## 2017-12-30 PROCEDURE — 94760 N-INVAS EAR/PLS OXIMETRY 1: CPT

## 2017-12-30 PROCEDURE — 93005 ELECTROCARDIOGRAM TRACING: CPT

## 2017-12-30 PROCEDURE — 700105 HCHG RX REV CODE 258: Performed by: EMERGENCY MEDICINE

## 2017-12-30 PROCEDURE — 87040 BLOOD CULTURE FOR BACTERIA: CPT | Mod: 91

## 2017-12-30 PROCEDURE — 83540 ASSAY OF IRON: CPT

## 2017-12-30 PROCEDURE — 82728 ASSAY OF FERRITIN: CPT

## 2017-12-30 PROCEDURE — 85730 THROMBOPLASTIN TIME PARTIAL: CPT

## 2017-12-30 PROCEDURE — 84484 ASSAY OF TROPONIN QUANT: CPT

## 2017-12-30 PROCEDURE — 82140 ASSAY OF AMMONIA: CPT

## 2017-12-30 PROCEDURE — 51798 US URINE CAPACITY MEASURE: CPT

## 2017-12-30 PROCEDURE — 770020 HCHG ROOM/CARE - TELE (206)

## 2017-12-30 PROCEDURE — 83690 ASSAY OF LIPASE: CPT

## 2017-12-30 PROCEDURE — 82803 BLOOD GASES ANY COMBINATION: CPT

## 2017-12-30 PROCEDURE — 83605 ASSAY OF LACTIC ACID: CPT

## 2017-12-30 PROCEDURE — 700111 HCHG RX REV CODE 636 W/ 250 OVERRIDE (IP): Performed by: EMERGENCY MEDICINE

## 2017-12-30 PROCEDURE — 93005 ELECTROCARDIOGRAM TRACING: CPT | Performed by: EMERGENCY MEDICINE

## 2017-12-30 PROCEDURE — 81003 URINALYSIS AUTO W/O SCOPE: CPT

## 2017-12-30 PROCEDURE — 96375 TX/PRO/DX INJ NEW DRUG ADDON: CPT

## 2017-12-30 PROCEDURE — 83735 ASSAY OF MAGNESIUM: CPT

## 2017-12-30 PROCEDURE — 80053 COMPREHEN METABOLIC PANEL: CPT

## 2017-12-30 PROCEDURE — 71010 DX-CHEST-PORTABLE (1 VIEW): CPT

## 2017-12-30 PROCEDURE — 82607 VITAMIN B-12: CPT

## 2017-12-30 PROCEDURE — 80307 DRUG TEST PRSMV CHEM ANLYZR: CPT

## 2017-12-30 PROCEDURE — 304561 HCHG STAT O2

## 2017-12-30 PROCEDURE — 83880 ASSAY OF NATRIURETIC PEPTIDE: CPT

## 2017-12-30 PROCEDURE — 99285 EMERGENCY DEPT VISIT HI MDM: CPT

## 2017-12-30 PROCEDURE — 82436 ASSAY OF URINE CHLORIDE: CPT

## 2017-12-30 RX ORDER — LEVALBUTEROL INHALATION SOLUTION 0.63 MG/3ML
0.63 SOLUTION RESPIRATORY (INHALATION)
Status: DISCONTINUED | OUTPATIENT
Start: 2017-12-30 | End: 2018-01-11 | Stop reason: HOSPADM

## 2017-12-30 RX ORDER — POLYETHYLENE GLYCOL 3350 17 G/17G
1 POWDER, FOR SOLUTION ORAL
Status: DISCONTINUED | OUTPATIENT
Start: 2017-12-30 | End: 2018-01-11 | Stop reason: HOSPADM

## 2017-12-30 RX ORDER — METOPROLOL TARTRATE 50 MG/1
50 TABLET, FILM COATED ORAL 2 TIMES DAILY
Status: DISCONTINUED | OUTPATIENT
Start: 2017-12-30 | End: 2018-01-05

## 2017-12-30 RX ORDER — ATORVASTATIN CALCIUM 20 MG/1
20 TABLET, FILM COATED ORAL DAILY
Status: DISCONTINUED | OUTPATIENT
Start: 2017-12-31 | End: 2018-01-01

## 2017-12-30 RX ORDER — DIPHENHYDRAMINE HYDROCHLORIDE 50 MG/ML
12.5 INJECTION INTRAMUSCULAR; INTRAVENOUS ONCE
Status: COMPLETED | OUTPATIENT
Start: 2017-12-30 | End: 2017-12-30

## 2017-12-30 RX ORDER — MORPHINE SULFATE 15 MG/1
15 TABLET ORAL EVERY 8 HOURS PRN
COMMUNITY

## 2017-12-30 RX ORDER — SODIUM CHLORIDE 9 MG/ML
INJECTION, SOLUTION INTRAVENOUS CONTINUOUS
Status: DISCONTINUED | OUTPATIENT
Start: 2017-12-30 | End: 2017-12-31

## 2017-12-30 RX ORDER — PREDNISONE 20 MG/1
40 TABLET ORAL DAILY
Status: DISCONTINUED | OUTPATIENT
Start: 2017-12-30 | End: 2018-01-03

## 2017-12-30 RX ORDER — DOXYCYCLINE 100 MG/1
100 TABLET ORAL ONCE
Status: DISCONTINUED | OUTPATIENT
Start: 2017-12-30 | End: 2017-12-30

## 2017-12-30 RX ORDER — TAMSULOSIN HYDROCHLORIDE 0.4 MG/1
0.4 CAPSULE ORAL
Status: DISCONTINUED | OUTPATIENT
Start: 2017-12-31 | End: 2018-01-11 | Stop reason: HOSPADM

## 2017-12-30 RX ORDER — GABAPENTIN 300 MG/1
300 CAPSULE ORAL
COMMUNITY

## 2017-12-30 RX ORDER — BUDESONIDE AND FORMOTEROL FUMARATE DIHYDRATE 160; 4.5 UG/1; UG/1
2 AEROSOL RESPIRATORY (INHALATION) 2 TIMES DAILY
COMMUNITY

## 2017-12-30 RX ORDER — METHYLPREDNISOLONE SODIUM SUCCINATE 125 MG/2ML
125 INJECTION, POWDER, LYOPHILIZED, FOR SOLUTION INTRAMUSCULAR; INTRAVENOUS ONCE
Status: COMPLETED | OUTPATIENT
Start: 2017-12-30 | End: 2017-12-30

## 2017-12-30 RX ORDER — AMOXICILLIN 250 MG
2 CAPSULE ORAL 2 TIMES DAILY
Status: DISCONTINUED | OUTPATIENT
Start: 2017-12-31 | End: 2018-01-05

## 2017-12-30 RX ORDER — CEFTRIAXONE 2 G/1
2 INJECTION, POWDER, FOR SOLUTION INTRAMUSCULAR; INTRAVENOUS ONCE
Status: COMPLETED | OUTPATIENT
Start: 2017-12-30 | End: 2017-12-30

## 2017-12-30 RX ORDER — DOXYCYCLINE 100 MG/1
200 TABLET ORAL ONCE
Status: COMPLETED | OUTPATIENT
Start: 2017-12-30 | End: 2017-12-30

## 2017-12-30 RX ORDER — BISACODYL 10 MG
10 SUPPOSITORY, RECTAL RECTAL
Status: DISCONTINUED | OUTPATIENT
Start: 2017-12-30 | End: 2018-01-11 | Stop reason: HOSPADM

## 2017-12-30 RX ADMIN — DIPHENHYDRAMINE HYDROCHLORIDE 12.5 MG: 50 INJECTION, SOLUTION INTRAMUSCULAR; INTRAVENOUS at 18:58

## 2017-12-30 RX ADMIN — CEFTRIAXONE SODIUM 2 G: 2 INJECTION, POWDER, FOR SOLUTION INTRAMUSCULAR; INTRAVENOUS at 18:57

## 2017-12-30 RX ADMIN — SODIUM CHLORIDE: 9 INJECTION, SOLUTION INTRAVENOUS at 18:58

## 2017-12-30 RX ADMIN — DOXYCYCLINE 200 MG: 100 TABLET ORAL at 18:58

## 2017-12-30 RX ADMIN — METHYLPREDNISOLONE SODIUM SUCCINATE 125 MG: 125 INJECTION, POWDER, FOR SOLUTION INTRAMUSCULAR; INTRAVENOUS at 18:58

## 2017-12-31 ENCOUNTER — APPOINTMENT (OUTPATIENT)
Dept: RADIOLOGY | Facility: MEDICAL CENTER | Age: 77
DRG: 189 | End: 2017-12-31
Attending: INTERNAL MEDICINE
Payer: MEDICARE

## 2017-12-31 PROBLEM — D64.9 ANEMIA: Status: ACTIVE | Noted: 2017-12-31

## 2017-12-31 PROBLEM — L03.119 CELLULITIS AND ABSCESS OF LOWER EXTREMITY: Status: ACTIVE | Noted: 2017-12-31

## 2017-12-31 PROBLEM — L02.419 CELLULITIS AND ABSCESS OF LOWER EXTREMITY: Status: ACTIVE | Noted: 2017-12-31

## 2017-12-31 LAB
ANION GAP SERPL CALC-SCNC: 7 MMOL/L (ref 0–11.9)
APTT PPP: 212 SEC (ref 24.7–36)
APTT PPP: 75.4 SEC (ref 24.7–36)
BASE EXCESS BLDA CALC-SCNC: 2 MMOL/L (ref -4–3)
BASOPHILS # BLD AUTO: 0 % (ref 0–1.8)
BASOPHILS # BLD: 0 K/UL (ref 0–0.12)
BODY TEMPERATURE: ABNORMAL CENTIGRADE
BUN SERPL-MCNC: 29 MG/DL (ref 8–22)
CALCIUM SERPL-MCNC: 9.3 MG/DL (ref 8.5–10.5)
CHLORIDE SERPL-SCNC: 98 MMOL/L (ref 96–112)
CO2 SERPL-SCNC: 30 MMOL/L (ref 20–33)
CREAT SERPL-MCNC: 1.64 MG/DL (ref 0.5–1.4)
CREAT UR-MCNC: 202.6 MG/DL
EOSINOPHIL # BLD AUTO: 0 K/UL (ref 0–0.51)
EOSINOPHIL NFR BLD: 0 % (ref 0–6.9)
ERYTHROCYTE [DISTWIDTH] IN BLOOD BY AUTOMATED COUNT: 50.4 FL (ref 35.9–50)
GFR SERPL CREATININE-BSD FRML MDRD: 41 ML/MIN/1.73 M 2
GLUCOSE SERPL-MCNC: 139 MG/DL (ref 65–99)
HCO3 BLDA-SCNC: 28 MMOL/L (ref 17–25)
HCT VFR BLD AUTO: 32.4 % (ref 42–52)
HGB BLD-MCNC: 10 G/DL (ref 14–18)
IMM GRANULOCYTES # BLD AUTO: 0.06 K/UL (ref 0–0.11)
IMM GRANULOCYTES NFR BLD AUTO: 0.7 % (ref 0–0.9)
LYMPHOCYTES # BLD AUTO: 0.69 K/UL (ref 1–4.8)
LYMPHOCYTES NFR BLD: 8.1 % (ref 22–41)
MCH RBC QN AUTO: 28.9 PG (ref 27–33)
MCHC RBC AUTO-ENTMCNC: 30.9 G/DL (ref 33.7–35.3)
MCV RBC AUTO: 93.6 FL (ref 81.4–97.8)
MONOCYTES # BLD AUTO: 0.18 K/UL (ref 0–0.85)
MONOCYTES NFR BLD AUTO: 2.1 % (ref 0–13.4)
NEUTROPHILS # BLD AUTO: 7.61 K/UL (ref 1.82–7.42)
NEUTROPHILS NFR BLD: 89.1 % (ref 44–72)
NRBC # BLD AUTO: 0 K/UL
NRBC BLD-RTO: 0 /100 WBC
PCO2 BLDA: 48.6 MMHG (ref 26–37)
PH BLDA: 7.38 [PH] (ref 7.4–7.5)
PLATELET # BLD AUTO: 230 K/UL (ref 164–446)
PMV BLD AUTO: 8.8 FL (ref 9–12.9)
PO2 BLDA: 62.8 MMHG (ref 64–87)
POTASSIUM SERPL-SCNC: 4.9 MMOL/L (ref 3.6–5.5)
PROCALCITONIN SERPL-MCNC: 0.15 NG/ML
RBC # BLD AUTO: 3.46 M/UL (ref 4.7–6.1)
SAO2 % BLDA: 91.8 % (ref 93–99)
SODIUM SERPL-SCNC: 135 MMOL/L (ref 135–145)
TSH SERPL DL<=0.005 MIU/L-ACNC: 0.31 UIU/ML (ref 0.38–5.33)
WBC # BLD AUTO: 8.5 K/UL (ref 4.8–10.8)

## 2017-12-31 PROCEDURE — 85025 COMPLETE CBC W/AUTO DIFF WBC: CPT

## 2017-12-31 PROCEDURE — 71010 DX-CHEST-PORTABLE (1 VIEW): CPT

## 2017-12-31 PROCEDURE — 770020 HCHG ROOM/CARE - TELE (206)

## 2017-12-31 PROCEDURE — 700105 HCHG RX REV CODE 258: Performed by: INTERNAL MEDICINE

## 2017-12-31 PROCEDURE — 85730 THROMBOPLASTIN TIME PARTIAL: CPT

## 2017-12-31 PROCEDURE — 700111 HCHG RX REV CODE 636 W/ 250 OVERRIDE (IP): Performed by: INTERNAL MEDICINE

## 2017-12-31 PROCEDURE — 84145 PROCALCITONIN (PCT): CPT

## 2017-12-31 PROCEDURE — A9270 NON-COVERED ITEM OR SERVICE: HCPCS | Performed by: INTERNAL MEDICINE

## 2017-12-31 PROCEDURE — 82803 BLOOD GASES ANY COMBINATION: CPT

## 2017-12-31 PROCEDURE — 36415 COLL VENOUS BLD VENIPUNCTURE: CPT

## 2017-12-31 PROCEDURE — 99223 1ST HOSP IP/OBS HIGH 75: CPT | Mod: GC | Performed by: INTERNAL MEDICINE

## 2017-12-31 PROCEDURE — 700101 HCHG RX REV CODE 250: Performed by: INTERNAL MEDICINE

## 2017-12-31 PROCEDURE — 94640 AIRWAY INHALATION TREATMENT: CPT

## 2017-12-31 PROCEDURE — 84443 ASSAY THYROID STIM HORMONE: CPT

## 2017-12-31 PROCEDURE — 80048 BASIC METABOLIC PNL TOTAL CA: CPT

## 2017-12-31 PROCEDURE — 700102 HCHG RX REV CODE 250 W/ 637 OVERRIDE(OP): Performed by: INTERNAL MEDICINE

## 2017-12-31 PROCEDURE — 94760 N-INVAS EAR/PLS OXIMETRY 1: CPT

## 2017-12-31 RX ORDER — BUDESONIDE AND FORMOTEROL FUMARATE DIHYDRATE 160; 4.5 UG/1; UG/1
2 AEROSOL RESPIRATORY (INHALATION)
Status: DISCONTINUED | OUTPATIENT
Start: 2017-12-31 | End: 2018-01-02

## 2017-12-31 RX ORDER — IPRATROPIUM BROMIDE AND ALBUTEROL SULFATE 2.5; .5 MG/3ML; MG/3ML
3 SOLUTION RESPIRATORY (INHALATION)
Status: DISCONTINUED | OUTPATIENT
Start: 2017-12-31 | End: 2018-01-01

## 2017-12-31 RX ORDER — BUDESONIDE AND FORMOTEROL FUMARATE DIHYDRATE 160; 4.5 UG/1; UG/1
2 AEROSOL RESPIRATORY (INHALATION)
Status: DISCONTINUED | OUTPATIENT
Start: 2017-12-31 | End: 2017-12-31

## 2017-12-31 RX ORDER — HEPARIN SODIUM 1000 [USP'U]/ML
3800 INJECTION, SOLUTION INTRAVENOUS; SUBCUTANEOUS PRN
Status: DISCONTINUED | OUTPATIENT
Start: 2017-12-31 | End: 2018-01-01

## 2017-12-31 RX ADMIN — HEPARIN SODIUM 1150 UNITS/HR: 5000 INJECTION, SOLUTION INTRAVENOUS at 23:27

## 2017-12-31 RX ADMIN — STANDARDIZED SENNA CONCENTRATE AND DOCUSATE SODIUM 2 TABLET: 8.6; 5 TABLET, FILM COATED ORAL at 20:09

## 2017-12-31 RX ADMIN — STANDARDIZED SENNA CONCENTRATE AND DOCUSATE SODIUM 2 TABLET: 8.6; 5 TABLET, FILM COATED ORAL at 12:55

## 2017-12-31 RX ADMIN — IPRATROPIUM BROMIDE AND ALBUTEROL SULFATE 3 ML: .5; 3 SOLUTION RESPIRATORY (INHALATION) at 15:26

## 2017-12-31 RX ADMIN — PREDNISONE 40 MG: 20 TABLET ORAL at 00:50

## 2017-12-31 RX ADMIN — AMPICILLIN SODIUM AND SULBACTAM SODIUM: 100; 50 INJECTION, POWDER, FOR SOLUTION INTRAVENOUS at 00:50

## 2017-12-31 RX ADMIN — HEPARIN SODIUM 1450 UNITS/HR: 5000 INJECTION, SOLUTION INTRAVENOUS at 03:23

## 2017-12-31 RX ADMIN — IPRATROPIUM BROMIDE AND ALBUTEROL SULFATE 3 ML: .5; 3 SOLUTION RESPIRATORY (INHALATION) at 11:48

## 2017-12-31 RX ADMIN — BUDESONIDE AND FORMOTEROL FUMARATE DIHYDRATE 2 PUFF: 160; 4.5 AEROSOL RESPIRATORY (INHALATION) at 19:33

## 2017-12-31 RX ADMIN — IPRATROPIUM BROMIDE AND ALBUTEROL SULFATE 3 ML: .5; 3 SOLUTION RESPIRATORY (INHALATION) at 02:55

## 2017-12-31 RX ADMIN — IPRATROPIUM BROMIDE AND ALBUTEROL SULFATE 3 ML: .5; 3 SOLUTION RESPIRATORY (INHALATION) at 23:02

## 2017-12-31 RX ADMIN — METOPROLOL TARTRATE 50 MG: 50 TABLET, FILM COATED ORAL at 08:31

## 2017-12-31 RX ADMIN — AMPICILLIN SODIUM AND SULBACTAM SODIUM 3 G: 2; 1 INJECTION, POWDER, FOR SOLUTION INTRAMUSCULAR; INTRAVENOUS at 02:36

## 2017-12-31 RX ADMIN — IPRATROPIUM BROMIDE AND ALBUTEROL SULFATE 3 ML: .5; 3 SOLUTION RESPIRATORY (INHALATION) at 08:26

## 2017-12-31 RX ADMIN — METOPROLOL TARTRATE 50 MG: 50 TABLET, FILM COATED ORAL at 00:50

## 2017-12-31 RX ADMIN — ATORVASTATIN CALCIUM 20 MG: 20 TABLET, FILM COATED ORAL at 08:31

## 2017-12-31 RX ADMIN — BUDESONIDE AND FORMOTEROL FUMARATE DIHYDRATE 2 PUFF: 160; 4.5 AEROSOL RESPIRATORY (INHALATION) at 08:31

## 2017-12-31 RX ADMIN — METOPROLOL TARTRATE 50 MG: 50 TABLET, FILM COATED ORAL at 20:09

## 2017-12-31 RX ADMIN — TAMSULOSIN HYDROCHLORIDE 0.4 MG: 0.4 CAPSULE ORAL at 08:31

## 2017-12-31 RX ADMIN — IPRATROPIUM BROMIDE AND ALBUTEROL SULFATE 3 ML: .5; 3 SOLUTION RESPIRATORY (INHALATION) at 19:33

## 2017-12-31 ASSESSMENT — ENCOUNTER SYMPTOMS
SORE THROAT: 0
ABDOMINAL PAIN: 0
SPUTUM PRODUCTION: 1
LOSS OF CONSCIOUSNESS: 0
FOCAL WEAKNESS: 0
BLURRED VISION: 1
SHORTNESS OF BREATH: 1
SPEECH CHANGE: 1
MYALGIAS: 0
POLYDIPSIA: 0
FEVER: 0
CONSTIPATION: 1
EYES NEGATIVE: 1
ORTHOPNEA: 1
CHILLS: 0
NERVOUS/ANXIOUS: 0
DIZZINESS: 0
PALPITATIONS: 0
HEADACHES: 0
SENSORY CHANGE: 0
FALLS: 0
NAUSEA: 0
BLOOD IN STOOL: 0
BRUISES/BLEEDS EASILY: 0
DOUBLE VISION: 0
COUGH: 1
MEMORY LOSS: 1
WEAKNESS: 1
WHEEZING: 1
VOMITING: 0
DIARRHEA: 0
BACK PAIN: 1

## 2017-12-31 ASSESSMENT — LIFESTYLE VARIABLES
ALCOHOL_USE: NO
EVER_SMOKED: YES
EVER_SMOKED: YES
SUBSTANCE_ABUSE: 0

## 2017-12-31 ASSESSMENT — PATIENT HEALTH QUESTIONNAIRE - PHQ9
SUM OF ALL RESPONSES TO PHQ9 QUESTIONS 1 AND 2: 0
1. LITTLE INTEREST OR PLEASURE IN DOING THINGS: NOT AT ALL
SUM OF ALL RESPONSES TO PHQ QUESTIONS 1-9: 0

## 2017-12-31 ASSESSMENT — PAIN SCALES - GENERAL
PAINLEVEL_OUTOF10: 0

## 2017-12-31 NOTE — ASSESSMENT & PLAN NOTE
- Sinus tachycardia on EKG at admission  - On xeralto at home - held on admission for LINDSEY  - Anticoagulation switched to eliquis prior to discharge  - Echo Nov 2017 : LV EF 60%, RVSP 16 mm Hg  Plan  - Eliquis (continued to prevent embolic CVA)

## 2017-12-31 NOTE — ED NOTES
Placed call to pts home pharmacy  All medications filled between 11/2017/12/2017.  All other medications removed as pt has not picked up   Atorvastatin  Furosemide  Metoprolol (switched to carvedilol in October)  Lisinopril  Tamsulosin  Pt has not picked any of the above listed medications  Between March 2017-October 2017.    No antibiotics noted on file.

## 2017-12-31 NOTE — RESPIRATORY CARE
COPD EDUCATION by COPD CLINICAL EDUCATOR  12/31/2017 at 6:32 AM by Anna Galeana     Patient reviewed by COPD education team. Patient does not qualify for COPD program.

## 2017-12-31 NOTE — ED NOTES
"Patient arrives from home via [facility] for respiratory distress. Per EMS report, patient has had ALOC today and severe dyspnea. EMS states patient end tidal Co2 was 60 en route. Patient has hx of hypercapnic respiratory failure as well as hx of COPD, emphysema, and CHF. Patient is alert and oriented to place, person, and situation, but not time. Patient is 4L O2 dependant at home, requiring 6L to maintain SpO2 above 92%.    Patient noted w/ distended abdomen. Patient noted w/ pitting edema to bilateral lower extremities w/ severe redness. Patient states, \"I have cellulitis.     Heart failure and sepsis protocols initiated.   "

## 2017-12-31 NOTE — CARE PLAN
Problem: Oxygenation:  Goal: Maintain adequate oxygenation dependent on patient condition  Outcome: PROGRESSING AS EXPECTED    Intervention: Manage oxygen therapy by monitoring pulse oximetry and/or ABG values  Pt on 4L NC, SPO2 94%      Problem: Bronchoconstriction:  Goal: Improve in air movement and diminished wheezing  Outcome: PROGRESSING AS EXPECTED  Duo Q4  Symbicort BID

## 2017-12-31 NOTE — PROGRESS NOTES
Internal Medicine Interval Note  Note Author: Cait Peter M.D.     Name MARSHAL Calzada       1940   Age/Sex 77 y.o. male   MRN [id number]   Code Status Partial, okay to to intubate for short term for respiratory distress, no compressions     After 5PM or if no immediate response to page, please call for cross-coverage  Attending/Team: Dr. Quach/ David See Patient List for primary contact information  Call [phone] to page    1st Call - Day Intern (R1):   Dr. Peter 2nd Call - Day Sr. Resident (R2/R3):   Dr. Ortega         Reason for interval visit  (Principal Problem)   COPD (chronic obstructive pulmonary disease) with acute bronchitis (CMS-[facility])    Interval Problem Daily Status Update  (24 hours)   77 year old male with complex medical history presents with acute on chronic hypercapnic respiratory failure likely secondary to COPD exacerbation    - no acute events overnight, patient confused this morning but improved soon after waking and with opening windows, A&O x 2 (person and place). Denies headaches, neck pain or vision changes   - back on baseline O2 of 4L with saturation >95%, refused repeat ABG. Denies chest pain, fevers or chills, + chronic cough. Discontinue abx, do not think this is infectious. Continue O2 support and steroids   - patient very clear in his wishes to not be here in the hospital, he states he does not want to keep coming back to the hospital for his breathing, he verbalized understanding this his disease is chronic and will continue to worsen over time. Had discussion with patient about future plans of care, patient is willing to speak with Hospice. Daughter Na contacted regarding this plan and is agreeable.       Review of Systems   Constitutional: Positive for malaise/fatigue. Negative for chills and fever.   HENT: Negative for congestion, hearing loss and sore throat.    Eyes: Positive for blurred vision. Negative for double vision.        Chronic blurred vision,  wears glasses   Respiratory: Positive for cough, sputum production, shortness of breath and wheezing.    Cardiovascular: Positive for orthopnea and leg swelling. Negative for chest pain and palpitations.   Gastrointestinal: Positive for constipation. Negative for abdominal pain, blood in stool, diarrhea, melena, nausea and vomiting.   Genitourinary: Negative for dysuria, frequency and urgency.   Musculoskeletal: Positive for back pain. Negative for falls and myalgias.   Skin: Negative for itching and rash.   Neurological: Positive for weakness. Negative for dizziness, sensory change, focal weakness and headaches.   Endo/Heme/Allergies: Negative for polydipsia. Does not bruise/bleed easily.   Psychiatric/Behavioral: Positive for memory loss. Negative for substance abuse. The patient is not nervous/anxious.        Consultants/Specialty  Hospice    Disposition  Inpatient treatment for COPD exacerbation, family and patient considering hospice     Quality Measures    Reviewed items::  EKG reviewed, Labs reviewed, Medications reviewed and Radiology images reviewed  Muniz catheter::  No Muniz  DVT prophylaxis pharmacological::  Heparin          Physical Exam       Vitals:    12/31/17 0302 12/31/17 0715 12/31/17 0831 12/31/17 1150   BP: 139/56 (!) 165/84     Pulse: 90 74 83 86   Resp: 20 18 20 20   Temp: 36.7 °C (98 °F) 36.3 °C (97.4 °F)     SpO2: 95% 98% 96% 95%   Weight:       Height:         Body mass index is 34.31 kg/m². Weight: 105.4 kg (232 lb 5.8 oz)  Oxygen Therapy:  Pulse Oximetry: 95 %, O2 (LPM): 4, O2 Delivery: Silicone Nasal Cannula    Physical Exam   Constitutional:   Obese male, resting comfortably, in no acute distress   HENT:   Head: Normocephalic and atraumatic.   Mouth/Throat: Oropharynx is clear and moist. No oropharyngeal exudate.   Eyes: Conjunctivae and EOM are normal. Pupils are equal, round, and reactive to light. No scleral icterus.   Neck: No JVD present.   Large neck circumference, no obvious JVD    Cardiovascular: Normal rate, regular rhythm, normal heart sounds and intact distal pulses.  Exam reveals no gallop and no friction rub.    No murmur heard.  Pulmonary/Chest: No stridor. No respiratory distress. He has wheezes. He has rales. He exhibits no tenderness.   Increased WOB with use of accessory muscles, inspiratory and expiratory wheezing diffusely with rales in bilateral lower lobes   Abdominal: Bowel sounds are normal. There is no tenderness. There is no rebound and no guarding.   Obese abdomen with well healed surgical scars, mildly firm abdomen, no tenderness to palpation, no rebound or guarding    Musculoskeletal: He exhibits tenderness. He exhibits no deformity.   1+ pitting edema bilaterally   Lymphadenopathy:     He has no cervical adenopathy.   Neurological: He is alert. He displays normal reflexes. No cranial nerve deficit. He exhibits normal muscle tone.   Oriented x 2. Strength 4/4 RUE, R/LLE. 3/4 in LUE   Skin: Skin is warm and dry. There is erythema.   Lower extremities with signs of chronic cellulitis, no signs of superinfection   Psychiatric: Affect and judgment normal.   Poor memory         Lab Data Review:         12/31/2017  12:07 PM    Recent Labs      12/30/17   1754 12/30/17   1835  12/31/17   0939   SODIUM  134*   --   135   POTASSIUM  4.7   --   4.9   CHLORIDE  97   --   98   CO2  31   --   30   BUN  22   --   29*   CREATININE  2.13*   --   1.64*   MAGNESIUM   --   1.9   --    CALCIUM  9.2   --   9.3       Recent Labs      12/30/17   1754  12/31/17   0939   ALTSGPT  8   --    ASTSGOT  15   --    ALKPHOSPHAT  74   --    TBILIRUBIN  0.7   --    LIPASE  3*   --    GLUCOSE  118*  139*       Recent Labs      12/30/17   1754  12/30/17   1835  12/31/17   0939  12/31/17   0940   RBC  3.68*   --   3.46*   --    HEMOGLOBIN  10.4*   --   10.0*   --    HEMATOCRIT  34.8*   --   32.4*   --    PLATELETCT  264   --   230   --    PROTHROMBTM  16.3*   --    --    --    APTT  31.3   --    --    75.4*   INR  1.34*   --    --    --    IRON   --   14*   --    --    FERRITIN  111.2   --    --    --    TOTIRONBC   --   288   --    --        Recent Labs      12/30/17   1754  12/31/17   0939   WBC  12.8*  8.5   NEUTSPOLYS  79.00*  89.10*   LYMPHOCYTES  12.10*  8.10*   MONOCYTES  6.20  2.10   EOSINOPHILS  2.00  0.00   BASOPHILS  0.40  0.00   ASTSGOT  15   --    ALTSGPT  8   --    ALKPHOSPHAT  74   --    TBILIRUBIN  0.7   --            Assessment/Plan     * COPD (chronic obstructive pulmonary disease) with acute bronchitis (CMS-HCC)   Assessment & Plan    1-2 days of difficulty breathing and increased somnolence. Concern for AMS on admission, now improved although pt appears to have some cognitive impairment  Admitted in November 2017 for acute on chronic respiratory failure. PCO2 70-80 on that admission required intubation   Flu negative.  ABG showed pCO2 69.8. Bicarb 31, pt refused repeat   Elevated WBC, now normal   Plan:  Hold gabapentin and MS contin as these effect mental status, and opiates can worsen oxygenation   RT protocol, ipratropium, levalbuterol (as pt is already tachycardic)  Prednisone 40mg daily  Unasyn d/c today, pro-calc is negative and no evidence for infection   Code status discussed with patient and daughter. Daughter states that patient would not want chest compressions. But intubation would be ok for a short time on admission, family still agree at this time   Pt does not want to be hospitalized for his COPD anymore, agreeable to hospice consult, family contacted         LINDSEY (acute kidney injury) (CMS-HCC)- (present on admission)   Assessment & Plan    BUN/Cr 20/2.13 on admission. Baseline 1.2  Straight cath required for urinary retention.  Plan:  Hold lisinopril  Hold xeralto until LINDSEY resolves. Anticoagulation with weight based heparin   Urine lytes normal  - improved, continue to monitor         Chronic pulmonary embolism (CMS-HCC)- (present on admission)   Assessment & Plan     Chronically anticoagulated on xeralto. Will hold for now due to LINDSEY  Anticoagulation with wt based heparin until LINDSEY resolves  Wells score 4.5, moderate risk for PE however given improvement of hypoxia, MS and HR and patient already on anticoagulation will not complete imaging or D-dimer.         Anemia   Assessment & Plan    Hgb 10.4 on admission. Appears to be at baseline.   No prior iron panel  Iron low 14, TIBC 288, % sat 5, ferritin normal 111.2. Possible secondary to anemia of chronic disease.   Fecal occult blood pending         Cellulitis and abscess of lower extremity   Assessment & Plan    Patient is seen by wound care as outpatient for lower extremity cellulitis   Wound consult placed  Started on Unasyn on admission, discontinued as exam is more consistent with chronic cellulitis  Patient afebrile, WBC now normal   Continue to monitor         Paroxysmal atrial fibrillation (CMS-HCC)- (present on admission)   Assessment & Plan    Sinus tachycardia on EKG this admission  On xeralto at home. Held on admission for LINDSEY  Anticoagulation with wt based heparin  Monitor on Telemetry

## 2017-12-31 NOTE — SENIOR ADMIT NOTE
77 yr M with Hx of COPD, CHF, chronic DVT/Pul embolism presented to ED due to not feeling well, SOB, mildly drowsy for couple days.   The patient was in our hospital in Nov 2017, discharged to SNF, just went home about 1 week. The daughter was with him at ED today.     The patient had CO2 up to 70-80 last time when he was having COPD exacerbation/?pneumonia. The patient's daughter believes he is going down the same pathologic process this time, so she brought him in. The patient was very tired when we saw him. He could still make some jokes when interviewed. Not fully oriented, time is very difficult to him. Mentioned some SOB to us, but denied chest pain or other chest/heart/abd symptoms. The patient was having 5 L (4L when discharged last time) with Sat 95%, could finish perhaps 1-2 sentences.     CXR showed no definite patch, lactic acid 1.6, acute kidney injury, swelling over both legs, redness and some skin breaks over right side leg, elevated WBC and chronioc anemia. Flu neg. Normal trop and BNP. Sinus tachy on EKG.     Detailed communication regarding his code status done with his daughter. Okay to be intubated only for short-term/reversible problem. Clearly refused chest compression and defibrillation.     1. COPD exacerbation--RT protocol, Unasyn, CXR follow-up, pred, bronchodilator, will skip bipap, do intubation directly if his breathing gets worse. CO2 69.8. Bicab 31.     2. LINDSEY--Bun/Cr 20/2. Some urine retention noted, straight cath done. Might need curtis. Might need ultrasound if no improvement in 1-2 days. Urine tests ordered.     3. Long-term use of xarelto for his chronic DVT/PE. Changed his xarelto to full dose heparin due to LINDSEY.     Gapapentin is held due to not good mentation.   Lasix is held due to LINDSEY  Also, Held his lisinopril.

## 2017-12-31 NOTE — PROGRESS NOTES
"Skin Check 2 RN Admit    Generalized scabbing noted intact without bleeding.   Left buttocks pink, blanching. Pt denies discomfort.  Scrotum red, edematous but blanches.  Bilateral lower extremitie appear to be cellulitis, red, shiny, dusky with previous dressing over wound. Pt states \"it is infectious\" and has been receiving ABX and wound care for sites. Small abrasions noted on anterior shins, with scant serosanguinous secretions. Pictures of lower extremities obtained and uploaded into chart. New dressings placed with petroleum gauze and gauze roll. Wound consult placed. Charge RN notified of patient statement above.   "

## 2017-12-31 NOTE — ASSESSMENT & PLAN NOTE
- Hgb 10.4 on admission - at baseline  - Iron low 14, TIBC 288, % sat 5, ferritin normal 111.2  - likely due to anemia of chronic disease  - not for erythropoeitin as Hb >9

## 2017-12-31 NOTE — PROGRESS NOTES
Report received at bedside from RN. Patient A & O x 2. Patient resting in bed. No acute distress. Patient complains of pain 0/10. No behavioral pain indicators noted.  No SOB/dyspnea noted. No cough noted. Patient denies chest pain/palpitations.  Patient denies nausea. Abdomen: nondistended, nontender. + bowel sounds in all quadrants. Patient passing flatus.  No S/S hypoglycemia/hyperglycemia noted.  Skin: cellulitis on lower extremities Activity: 2 person assist  Fall and safety precautions maintained.Hourly rounding in progress.

## 2017-12-31 NOTE — PROGRESS NOTES
0000 - Pt arrived from ER via gurney, assessed, VSS, A&Ox2-3, intermittently confused and anxious,denies pain. Oriented to the unit, cardiac monitoring applied, POC explained, pt verbalized understanding, however repeatedly asking the same questions. Bedside swallow completed - no issues, cardiac 2g Na (cardiac Hx, CHF) diet ordered per written standing MD order. Admit and skin check completed. Call light in reach,  Bed  Alarm on.

## 2017-12-31 NOTE — ASSESSMENT & PLAN NOTE
- Chronically anticoagulated on xeralto - held on admission due to LINDSEY   - was anti-coagulated with wt based heparin which was later transitioned to eliquis   Plan  - Eliquis 5 mg BID

## 2017-12-31 NOTE — ED PROVIDER NOTES
ED Provider Note    Scribed for Jessica Cooley M.D. by Dhaval Boucher. 12/30/2017, 6:02 PM.    Primary care provider: Pcp Not In Computer  Means of arrival: EMS  History obtained from: Patient  History limited by: Patient's altered mental status.     CHIEF COMPLAINT  Chief Complaint   Patient presents with   • Shortness of Breath   • ALOC   • Respiratory Distress       HPI  MARSHAL Calzada is a 77 y.o. Male, with a history of CHF and COPD, who presents to the Emergency Department for severe shortness of breath that began 2 days ago with associated confusion. Per daughter, the patient's shortness of breath was first noticeable during a phone call 2 days ago and has greatly worsened since. Earlier today, the patient's confusion worsened severely secondary to his shortness of breath and EMS was called by the patient's daughter. En route to the ED, EMS reported an etCO2 of 70% on room air and spO2 of 96% on 4L O2. He has not had a bowel movement or urinated today and is on 4L home O2 regularly.   The patient was also treated at [facility] 10 days ago for shortness of breath. Tests at the facility revealed elevated CO2 levels as well as possible traces of pneumonia.   Patient also complains of a rash on his bilateral arms for the past 4 weeks with associated itchiness. He denies any skin rashes or itchiness on his face or other parts of his body.   He is not experiencing fever, cough, nausea, vomiting.    History limited due to patient's altered mental status.     REVIEW OF SYSTEMS  Pertinent positives include confusion, shortness of breath, rash, itchiness. Pertinent negatives include no fever, cough, nausea, vomiting, face rash, face itchiness. C.  See HPI for further details.     Review of systems limited due to patient's altered mental status.     PAST MEDICAL HISTORY  Past Medical History:   Diagnosis Date   • Cellulitis    • CHF (congestive heart failure) (CMS-HCC)    • COPD (chronic obstructive pulmonary disease)  "(CMS-[facility])    • HTN (hypertension)    • Pulmonary embolism (CMS-[facility])        SURGICAL HISTORY  Past Surgical History:   Procedure Laterality Date   • HERNIA REPAIR         SOCIAL HISTORY  Social History   Substance Use Topics   • Smoking status: Former Smoker   • Smokeless tobacco: Never Used   • Alcohol use No      History   Drug Use No       FAMILY HISTORY  No pertinent past family history.    CURRENT MEDICATIONS    Current Outpatient Prescriptions on File Prior to Encounter   Medication Sig Dispense Refill   • ipratropium (ATROVENT) 0.02 % Solution 2.5 mL by Nebulization route every 6 hours. 30 Bullet    • levalbuterol (XOPENEX) 1.25 MG/3ML Nebu Soln 3 mL by Nebulization route every four hours as needed for Shortness of Breath. 24 mL    • metoprolol (LOPRESSOR) 50 MG Tab Take 1 Tab by mouth 2 Times a Day. 60 Tab    • tamsulosin (FLOMAX) 0.4 MG capsule Take 1 Cap by mouth ONE-HALF HOUR AFTER BREAKFAST. 30 Cap    • atorvastatin (LIPITOR) 20 MG Tab Take 20 mg by mouth every day.     • lisinopril (PRINIVIL) 20 MG Tab Take 20 mg by mouth every bedtime.     • furosemide (LASIX) 20 MG Tab Take 20 mg by mouth every day.     • morphine (MS IR) 15 MG tablet Take 15 mg by mouth every 8 hours as needed for Severe Pain.     • gabapentin (NEURONTIN) 300 MG Cap Take 300 mg by mouth every bedtime.     • rivaroxaban (XARELTO) 15 MG Tab tablet Take 15 mg by mouth with dinner.        ALLERGIES  Allergies   Allergen Reactions   • Other Drug      Allergic to 1 med, unsure of the name.        PHYSICAL EXAM  VITAL SIGNS: /68   Pulse (!) 108   Temp 37.2 °C (99 °F)   Resp 17   Ht 1.753 m (5' 9\")   Wt 109.9 kg (242 lb 4.6 oz)   SpO2 97%   BMI 35.78 kg/m²   Vitals reviewed.    Pulse ox: Pulse Oximetry was obtained.  I interpreted this as likely hypoxia.  Consitutional: Well-developed, well-nourished.Negative for: distress.  HENT: Normocephalic, right external ear normal, left external ear normal, oropharynx clear and dry  Eyes: " Conjunctivae normal, extraocular movements normal. Negative for: discharge in right and left eye, icterus.  Neck: Range of motion normal, supple. Negative for cervical adenopathy.  Cardiovascular: Tachycardic, regular rhythm, heart sounds normal, intact distal pulses. Negative for: murmur, rub, gallop.  Pulmonary/Chest Wall: Effort normal, shallow respirations bilaterally with rales at bilateral bases.  Negative for: respiratory distress, wheezes, rhonchi.   Abdominal: Soft, bowel sounds normal. Negative for: distention, tenderness, rebound, guarding.  Musculoskeletal: Normal range of motion. 3+ pitting edema in bilateral lower extremities up to the mid-thigh. Stockinet on the bilateral lower extremities with circumferential hot erythema to right lower leg.   Neurological: Alert and oriented x2. No focal deficits.  Skin: Dry. Stockinet on the bilateral lower extremities with circumferential hot erythema to right lower leg. Raised, dry, scabbed, excoriated rash on bilateral hands and upper extremities.  Stockinet on the bilateral lower extremities.  Psych: Flat affect.  Agitated. Tense      DIAGNOSTIC STUDIES / PROCEDURES    LABS  Results for orders placed or performed during the hospital encounter of 12/30/17   Btype Natriuretic Peptide   Result Value Ref Range    B Natriuretic Peptide 50 0 - 100 pg/mL   CBC with Differential   Result Value Ref Range    WBC 12.8 (H) 4.8 - 10.8 K/uL    RBC 3.68 (L) 4.70 - 6.10 M/uL    Hemoglobin 10.4 (L) 14.0 - 18.0 g/dL    Hematocrit 34.8 (L) 42.0 - 52.0 %    MCV 94.6 81.4 - 97.8 fL    MCH 28.3 27.0 - 33.0 pg    MCHC 29.9 (L) 33.7 - 35.3 g/dL    RDW 53.0 (H) 35.9 - 50.0 fL    Platelet Count 264 164 - 446 K/uL    MPV 8.4 (L) 9.0 - 12.9 fL    Nucleated RBC 0.00 /100 WBC    NRBC (Absolute) 0.00 K/uL    Neutrophils-Polys 79.00 (H) 44.00 - 72.00 %    Lymphocytes 12.10 (L) 22.00 - 41.00 %    Monocytes 6.20 0.00 - 13.40 %    Eosinophils 2.00 0.00 - 6.90 %    Basophils 0.40 0.00 - 1.80 %     Immature Granulocytes 0.30 0.00 - 0.90 %    Neutrophils (Absolute) 10.09 (H) 1.82 - 7.42 K/uL    Lymphs (Absolute) 1.55 1.00 - 4.80 K/uL    Monos (Absolute) 0.79 0.00 - 0.85 K/uL    Eos (Absolute) 0.25 0.00 - 0.51 K/uL    Baso (Absolute) 0.05 0.00 - 0.12 K/uL    Immature Granulocytes (abs) 0.04 0.00 - 0.11 K/uL    Anisocytosis 1+     Microcytosis 1+    Complete Metabolic Panel (CMP)   Result Value Ref Range    Sodium 134 (L) 135 - 145 mmol/L    Potassium 4.7 3.6 - 5.5 mmol/L    Chloride 97 96 - 112 mmol/L    Co2 31 20 - 33 mmol/L    Anion Gap 6.0 0.0 - 11.9    Glucose 118 (H) 65 - 99 mg/dL    Bun 22 8 - 22 mg/dL    Creatinine 2.13 (H) 0.50 - 1.40 mg/dL    Calcium 9.2 8.5 - 10.5 mg/dL    AST(SGOT) 15 12 - 45 U/L    ALT(SGPT) 8 2 - 50 U/L    Alkaline Phosphatase 74 30 - 99 U/L    Total Bilirubin 0.7 0.1 - 1.5 mg/dL    Albumin 3.4 3.2 - 4.9 g/dL    Total Protein 6.7 6.0 - 8.2 g/dL    Globulin 3.3 1.9 - 3.5 g/dL    A-G Ratio 1.0 g/dL   Prothrombin Time   Result Value Ref Range    PT 16.3 (H) 12.0 - 14.6 sec    INR 1.34 (H) 0.87 - 1.13   APTT   Result Value Ref Range    APTT 31.3 24.7 - 36.0 sec   Lipase   Result Value Ref Range    Lipase 3 (L) 11 - 82 U/L   Lactic acid (lactate)   Result Value Ref Range    Lactic Acid 1.6 0.5 - 2.0 mmol/L   AMMONIA   Result Value Ref Range    Ammonia 33 11 - 45 umol/L   ARTERIAL BLOOD GAS w/ O2 (LAB)   Result Value Ref Range    Ph 7.27 (LL) 7.40 - 7.50    Pco2 69.8 (HH) 26.0 - 37.0 mmHg    Po2 73.3 64.0 - 87.0 mmHg    O2 Saturation 93.5 93.0 - 99.0 %    Hco3 31 (H) 17 - 25 mmol/L    Base Excess 3 -4 - 3 mmol/L    Body Temp see below Centigrade    O2 Therapy 5.0 2.0 - 10.0 L/min   ESTIMATED GFR   Result Value Ref Range    GFR If  37 (A) >60 mL/min/1.73 m 2    GFR If Non African American 30 (A) >60 mL/min/1.73 m 2   PERIPHERAL SMEAR REVIEW   Result Value Ref Range    Peripheral Smear Review see below    MORPHOLOGY   Result Value Ref Range    RBC Morphology Present      Giant Platelets 1+     Ovalocytes 1+    DIFFERENTIAL COMMENT   Result Value Ref Range    Comments-Diff see below    EKG (ER)   Result Value Ref Range    Report       [facility] Emergency Dept.    Test Date:  2017  Pt Name:    MARSHAL LAGUERRE                     Department: ER  MRN:        [id number]                      Room:       [facility]  Gender:     Male                         Technician: [id number]  :        [id number]                   Requested By:ER TRIAGE PROTOCOL  Order #:    [id number]                    Reading MD:    Measurements  Intervals                                Axis  Rate:       108                          P:          0  MD:         90                           QRS:        42  QRSD:       106                          T:  QT:         320  QTc:        429    Interpretive Statements  SINUS TACHYCARDIA  BORDERLINE INTRAVENTRICULAR CONDUCTION DELAY  BORDERLINE R WAVE PROGRESSION, ANTERIOR LEADS  BASELINE WANDER IN LEAD(S) II,III,aVF  Compared to ECG 2017 20:53:55  Atrial fibrillation no longer present        All labs reviewed by me.    EKG Interpretation:12 lead EKG by my interpretation shows SINUS TACHYCARDIA at 108  BORDERLINE INTRAVENTRICULAR CONDUCTION DELAY  BORDERLINE R WAVE PROGRESSION, ANTERIOR LEADS  BASELINE WANDER IN LEAD(S) II,III,aVF  Poor R wave progression  No obvious ST changes to indicate ischemia or infarct  Compared to ECG 2017 20:53:55  Atrial fibrillation no longer present    RADIOLOGY  DX-CHEST-PORTABLE (1 VIEW)   Final Result      Enlarged cardiac silhouette.        The radiologist's interpretation of all radiological studies have been reviewed by me.    COURSE & MEDICAL DECISION MAKING  Nursing notes, VS, PMSFHx reviewed in chart.    Obtained and reviewed past medical records from his recent admission last month with the following summary:   • ALOC         CODE STATUS  DNR     HPI & HOSPITAL COURSE  This is a 77 y.o. male who was admitted on 17  after presenting to ER with dyspnea and also ALOC. He was found to be hypoxic an ABG was done showing pH of 7.16, pCO2 79.7and pt was intubated in ER. Most likely cause of his acute on chronic respiratory failure was due to COPD exacerbation and possible pneumonia. He was started on steroids and also on ceftriaxone and azithro for possible community acquired pneumonia. He also had an LINDSEY on admission.  Pt also became hypotensive requiring pressors during his stay in critical care unit. A bronchoscopy was also performed showing some thick yellow secretion on the left lower lobe and definite endobronchial tumor identified.   Pt symptoms continued to improve he was extubated on 11/19/17. He finished abx course for his pneumonia.  Pt breathing has improved he is now back to his baseline of 4 L of O2 requirement    His hyponatremia, ALOC  and LINDSEY also resolved.   Pt will be discharged to skilled today.       6:02 PM Patient seen and examined at bedside. The patient presents with dyspnea and altered mentation and the differential diagnosis includes but is not limited to infection, hypercapnia, hypoxia. Informed the patient and his daughter that admission would be necessary to continue treatment and evaluation. They agree to the plan of care. Ordered DX chest, arterial blood gas, ammonia, eGFR, peripheral smear review, morphology, differential comment, lactate, urinalysis, urine culture, blood culture, troponin, BNP, CBC, CMP, prothrombin time, APTT, lipase, and EKG. Patient will be treated with Benadryl 12.5 mg IV and Solu-medrol 124 mg IV for his symptoms.  Administered NS bolus for possible sepsis.     6:52 PM Paged [facility].  Lactate is negative for sepsis.  Pt given IV Rocephin and PO doxycycline.    6:55 PM Consulted with [facility] regarding the above case findings and they agree to admit the patient at this time.    CRITICAL CARE  The very real possibilty of a deterioration of this patient's condition required the  highest level of my preparedness for sudden, emergent intervention.  I provided critical care services, which included medication orders, frequent reevaluations of the patient's condition and response to treatment, ordering and reviewing test results, and discussing the case with various consultants.  The critical care time associated with the care of the patient was 35 minutes. Review chart for interventions. This time is exclusive of any other billable procedures.       DISPOSITION:  Patient will be admitted to [facility] in guarded condition.     FINAL IMPRESSION  1. Acute on chronic respiratory failure with hypoxia and hypercapnia (CMS-HCC)    2. Disorientation    3. Renal insufficiency          Dhaval ADAN (Scribe), am scribing for, and in the presence of, Jessica Cooley M.D..    Electronically signed by: Dhaval Boucher (Puneet), 12/30/2017    Jesscia ADAN M.D. personally performed the services described in this documentation, as scribed by Dhaval Boucher in my presence, and it is both accurate and complete.    The note accurately reflects work and decisions made by me.  Jessica Cooley  12/30/2017  7:25 PM

## 2017-12-31 NOTE — H&P
Internal Medicine Admitting History and Physical    Note Author: Bernabe Erwin M.D.       Name MARSHAL Calzada       1940   Age/Sex 77 y.o. male   MRN [id number]   Code Status Partial (DNR/ Intubation ok)     After 5PM or if no immediate response to page, please call for cross-coverage  Attending/Team: Dr. Quach/cristobal See Patient List for primary contact information  Call [phone] to page    1st Call - Day Intern (R1):   Dr. Peter 2nd Call - Day Sr. Resident (R2/R3):   Dr. Ortega       Chief Complaint:  Confusion and difficulty breathing    HPI:  Mr. Calzada is a 76 yo male with a PMHx of COPD, CHF, Chronic DVT/PE on xeralto, who was brought to the ED by his daughter for difficulty breathing and increased somnolence over the last 1-2 days. The patient is somewhat confused and mumbling so history was provided by daughter. The patient's daughter became concerned when her father started falling asleep while speaking and exhibiting the same symptoms as he did prior to his last hospitalization in 2017 for acute on chronic respiratory failure through to be due to COPD exacerbation or pna. At that time his CO2 was between 70-80 and he required intubation for several days. Patient normally uses 4L of oxygen at home but was increased to 5L this morning. His daughter felt she was showing similar early signs and brought him in. During our exam patient was mildly confused but oriented to person, place, and situation, but not time. Patient had SOB but no chest pain, cough, or abdominal pain. Lactic acid was 1.6. CXR only notable for an enlarged cardiac silhouette. Troponin and BNP normal. Flu negative.     Review of Systems   Constitutional: Positive for malaise/fatigue. Negative for chills and fever.   HENT: Negative for ear discharge, ear pain and nosebleeds.    Eyes: Negative.    Respiratory: Positive for shortness of breath.    Cardiovascular: Positive for leg swelling. Negative for chest pain and  palpitations.   Gastrointestinal: Negative for abdominal pain, nausea and vomiting.   Genitourinary: Negative.    Musculoskeletal: Positive for back pain (chronic). Negative for falls and myalgias.   Skin: Positive for itching and rash.   Neurological: Positive for speech change (mumbling). Negative for sensory change and loss of consciousness.             Past Medical History:   Past Medical History:   Diagnosis Date   • Cellulitis    • CHF (congestive heart failure) (CMS-[facility])    • COPD (chronic obstructive pulmonary disease) (CMS-HCC)    • HTN (hypertension)    • Pulmonary embolism (CMS-[facility])        Past Surgical History:  Past Surgical History:   Procedure Laterality Date   • HERNIA REPAIR         Current Outpatient Medications:  Home Medications     Reviewed by Davian Borjas (Pharmacy Tech) on 12/30/17 at 2357  Med List Status: Complete   Medication Last Dose Status   albuterol-ipratropium (COMBIVENT)  MCG/ACT Aerosol unknown Active   budesonide-formoterol (SYMBICORT) 160-4.5 MCG/ACT Aerosol unknown Active   gabapentin (NEURONTIN) 300 MG Cap unknown Active   morphine (MS IR) 15 MG tablet unknown Active   rivaroxaban (XARELTO) 20 MG Tab tablet unknown Active                Medication Allergy/Sensitivities:  Allergies   Allergen Reactions   • Other Drug      Allergic to 1 med, unsure of the name.          Family History:  No family history on file.    Social History:  Social History     Social History   • Marital status:      Spouse name: N/A   • Number of children: N/A   • Years of education: N/A     Occupational History   • Not on file.     Social History Main Topics   • Smoking status: Former Smoker   • Smokeless tobacco: Never Used   • Alcohol use No   • Drug use: No   • Sexual activity: Not on file     Other Topics Concern   • Not on file     Social History Narrative   • No narrative on file     [facility]        Physical Exam     Vitals:    12/30/17 2000 12/30/17 2101 12/30/17 2200  "12/30/17 2335   BP:    157/71   Pulse: (!) 107 (!) 117 (!) 114 (!) 114   Resp: 19 (!) 24 20 18   Temp:    37.7 °C (99.8 °F)   SpO2: 94% 93% 94%    Weight:       Height:         Body mass index is 35.78 kg/m².  /71   Pulse (!) 114   Temp 37.7 °C (99.8 °F)   Resp 18   Ht 1.753 m (5' 9\")   Wt 109.9 kg (242 lb 4.6 oz)   SpO2 94%   BMI 35.78 kg/m²   O2 therapy: Pulse Oximetry: 94 %, O2 (LPM): 6, O2 Delivery: Nasal Cannula    Physical Exam   Constitutional:   Obese elderly male in mild acute distress. Somewhat confused.    HENT:   Head: Normocephalic and atraumatic.   Eyes: EOM are normal. Pupils are equal, round, and reactive to light. No scleral icterus.   Neck: Normal range of motion. Neck supple.   Cardiovascular: S1 normal and S2 normal.  Tachycardia present.  Exam reveals no distant heart sounds.    No murmur heard.  Pulmonary/Chest:   Increased respiratory effort. No respiratory distress.   Diminished breath sounds most significant at bilateral bases. Infrequent wheezes.    Abdominal: Soft. Bowel sounds are normal. He exhibits no distension. There is no tenderness. There is no rebound.   Musculoskeletal: Normal range of motion. He exhibits edema.   LE swelling. R>L. Some skin break down of RLE. Legs wrapped bilaterally.    Lymphadenopathy:     He has no cervical adenopathy.   Neurological:   Alert on exam. Oriented to person, place, but not time.    Skin:   Patchy rash over bilateral arms.    Nursing note and vitals reviewed.      Data Review       Old Records Request:   Completed  Current Records review and summary: Completed    Lab Data Review:  Recent Results (from the past 24 hour(s))   EKG (ER)    Collection Time: 12/30/17  5:44 PM   Result Value Ref Range    Report       [facility] Emergency Dept.    Test Date:  2017-12-30  Pt Name:    MARSHAL LAGUERRE                     Department: ER  MRN:        [id number]                      Room:       [facility]  Gender:     Male                       "   Technician: [id number]  :        1940                   Requested By:ER TRIAGE PROTOCOL  Order #:    [id number]                    Reading MD: MANAN DELA CRUZ MD    Measurements  Intervals                                Axis  Rate:       108                          P:          0  CA:         90                           QRS:        42  QRSD:       106                          T:  QT:         320  QTc:        429    Interpretive Statements  SINUS TACHYCARDIA at 108  BORDERLINE INTRAVENTRICULAR CONDUCTION DELAY  BORDERLINE R WAVE PROGRESSION, ANTERIOR LEADS  BASELINE WANDER IN LEAD(S) II,III,aVF  Poor R wave progression  No obvious ST changes to indicate ischemia or infarct  Compared to ECG 2017 20:53:55  Atrial fibrillation no longer present    Elec tronically Signed On 2017 19:23:34 PST by MANAN DELA CRUZ MD     Troponin    Collection Time: 17  5:54 PM   Result Value Ref Range    Troponin I 0.03 0.00 - 0.04 ng/mL   Btype Natriuretic Peptide    Collection Time: 17  5:54 PM   Result Value Ref Range    B Natriuretic Peptide 50 0 - 100 pg/mL   CBC with Differential    Collection Time: 17  5:54 PM   Result Value Ref Range    WBC 12.8 (H) 4.8 - 10.8 K/uL    RBC 3.68 (L) 4.70 - 6.10 M/uL    Hemoglobin 10.4 (L) 14.0 - 18.0 g/dL    Hematocrit 34.8 (L) 42.0 - 52.0 %    MCV 94.6 81.4 - 97.8 fL    MCH 28.3 27.0 - 33.0 pg    MCHC 29.9 (L) 33.7 - 35.3 g/dL    RDW 53.0 (H) 35.9 - 50.0 fL    Platelet Count 264 164 - 446 K/uL    MPV 8.4 (L) 9.0 - 12.9 fL    Nucleated RBC 0.00 /100 WBC    NRBC (Absolute) 0.00 K/uL    Neutrophils-Polys 79.00 (H) 44.00 - 72.00 %    Lymphocytes 12.10 (L) 22.00 - 41.00 %    Monocytes 6.20 0.00 - 13.40 %    Eosinophils 2.00 0.00 - 6.90 %    Basophils 0.40 0.00 - 1.80 %    Immature Granulocytes 0.30 0.00 - 0.90 %    Neutrophils (Absolute) 10.09 (H) 1.82 - 7.42 K/uL    Lymphs (Absolute) 1.55 1.00 - 4.80 K/uL    Monos (Absolute) 0.79 0.00 - 0.85 K/uL    Eos (Absolute) 0.25  0.00 - 0.51 K/uL    Baso (Absolute) 0.05 0.00 - 0.12 K/uL    Immature Granulocytes (abs) 0.04 0.00 - 0.11 K/uL    Anisocytosis 1+     Microcytosis 1+    Complete Metabolic Panel (CMP)    Collection Time: 12/30/17  5:54 PM   Result Value Ref Range    Sodium 134 (L) 135 - 145 mmol/L    Potassium 4.7 3.6 - 5.5 mmol/L    Chloride 97 96 - 112 mmol/L    Co2 31 20 - 33 mmol/L    Anion Gap 6.0 0.0 - 11.9    Glucose 118 (H) 65 - 99 mg/dL    Bun 22 8 - 22 mg/dL    Creatinine 2.13 (H) 0.50 - 1.40 mg/dL    Calcium 9.2 8.5 - 10.5 mg/dL    AST(SGOT) 15 12 - 45 U/L    ALT(SGPT) 8 2 - 50 U/L    Alkaline Phosphatase 74 30 - 99 U/L    Total Bilirubin 0.7 0.1 - 1.5 mg/dL    Albumin 3.4 3.2 - 4.9 g/dL    Total Protein 6.7 6.0 - 8.2 g/dL    Globulin 3.3 1.9 - 3.5 g/dL    A-G Ratio 1.0 g/dL   Prothrombin Time    Collection Time: 12/30/17  5:54 PM   Result Value Ref Range    PT 16.3 (H) 12.0 - 14.6 sec    INR 1.34 (H) 0.87 - 1.13   APTT    Collection Time: 12/30/17  5:54 PM   Result Value Ref Range    APTT 31.3 24.7 - 36.0 sec   Lipase    Collection Time: 12/30/17  5:54 PM   Result Value Ref Range    Lipase 3 (L) 11 - 82 U/L   Lactic acid (lactate)    Collection Time: 12/30/17  5:54 PM   Result Value Ref Range    Lactic Acid 1.6 0.5 - 2.0 mmol/L   ESTIMATED GFR    Collection Time: 12/30/17  5:54 PM   Result Value Ref Range    GFR If  37 (A) >60 mL/min/1.73 m 2    GFR If Non African American 30 (A) >60 mL/min/1.73 m 2   PERIPHERAL SMEAR REVIEW    Collection Time: 12/30/17  5:54 PM   Result Value Ref Range    Peripheral Smear Review see below    MORPHOLOGY    Collection Time: 12/30/17  5:54 PM   Result Value Ref Range    RBC Morphology Present     Giant Platelets 1+     Ovalocytes 1+    DIFFERENTIAL COMMENT    Collection Time: 12/30/17  5:54 PM   Result Value Ref Range    Comments-Diff see below    FERRITIN    Collection Time: 12/30/17  5:54 PM   Result Value Ref Range    Ferritin 111.2 22.0 - 322.0 ng/mL   VITAMIN B12     Collection Time: 12/30/17  5:54 PM   Result Value Ref Range    Vitamin B12 -True Cobalamin 397 211 - 911 pg/mL   FOLATE    Collection Time: 12/30/17  5:54 PM   Result Value Ref Range    Folate -Folic Acid 15.0 >4.0 ng/mL   AMMONIA    Collection Time: 12/30/17  6:35 PM   Result Value Ref Range    Ammonia 33 11 - 45 umol/L   MAGNESIUM    Collection Time: 12/30/17  6:35 PM   Result Value Ref Range    Magnesium 1.9 1.5 - 2.5 mg/dL   IRON/TOTAL IRON BIND    Collection Time: 12/30/17  6:35 PM   Result Value Ref Range    Iron 14 (L) 50 - 180 ug/dL    Total Iron Binding 288 250 - 450 ug/dL    % Saturation 5 (L) 15 - 55 %   ARTERIAL BLOOD GAS w/ O2 (LAB)    Collection Time: 12/30/17  6:42 PM   Result Value Ref Range    Ph 7.27 (LL) 7.40 - 7.50    Pco2 69.8 (HH) 26.0 - 37.0 mmHg    Po2 73.3 64.0 - 87.0 mmHg    O2 Saturation 93.5 93.0 - 99.0 %    Hco3 31 (H) 17 - 25 mmol/L    Base Excess 3 -4 - 3 mmol/L    Body Temp see below Centigrade    O2 Therapy 5.0 2.0 - 10.0 L/min   INFLUENZA A/B BY PCR    Collection Time: 12/30/17  9:34 PM   Result Value Ref Range    Influenza virus A RNA Negative Negative    Influenza virus B, PCR Negative Negative   URINALYSIS    Collection Time: 12/30/17 10:22 PM   Result Value Ref Range    Color Yellow     Character Clear     Specific Gravity 1.015 <1.035    Ph 5.0 5.0 - 8.0    Glucose Negative Negative mg/dL    Ketones Negative Negative mg/dL    Protein Negative Negative mg/dL    Bilirubin Negative Negative    Urobilinogen, Urine 0.2 Negative    Nitrite Negative Negative    Leukocyte Esterase Negative Negative    Occult Blood Negative Negative    Micro Urine Req see below    URINE DRUG SCREEN    Collection Time: 12/30/17 10:22 PM   Result Value Ref Range    Amphetamines Urine Negative Negative    Barbiturates Negative Negative    Benzodiazepines Negative Negative    Cocaine Metabolite Negative Negative    Methadone Negative Negative    Opiates Positive (A) Negative    Oxycodone Negative Negative       Phencyclidine -Pcp Negative Negative    Propoxyphene Negative Negative    Cannabinoid Metab Negative Negative   URINE SODIUM RANDOM    Collection Time: 12/30/17 10:22 PM   Result Value Ref Range    Sodium, Urine -per volume 54 mmol/L   URINE CHLORIDE RANDOM    Collection Time: 12/30/17 10:22 PM   Result Value Ref Range    Chloride, Urine-per volume 62 mmol/L       Imaging/Procedures Review:    ndependant Imaging Review: Completed  DX-CHEST-PORTABLE (1 VIEW)   Final Result      Enlarged cardiac silhouette.      DX-CHEST-2 VIEWS    (Results Pending)          EKG:   EKG Independant Review: Completed  QTc:429, HR: 108, Sinus Tachycardia, Poor R waves progression. no ST/T changes     (x) Records reviewed and summarized in current documentation             Assessment/Plan     * COPD (chronic obstructive pulmonary disease) with acute bronchitis (CMS-HCC)   Assessment & Plan    1-2 days of difficulty breathing and increased somnolence. Concern for AMS.   Admitted in November 2017 for acute on chronic respiratory failure. PCO2 70-80 on that admission  Flu negative.  ABG showed pCO2 69.8. Bicarb 31  Elevated WBC  Plan:  Hold gabapentin and MS contin until mental status improves  RT protocol, ipratropium, levalbuterol (as pt is already tachycardic)  Prednisone 40mg daily  Unasyn  Code status discussed with patient and daughter. Daughter states that patient would not want chest compressions. But intubation would be ok for a short time.         LINDSEY (acute kidney injury) (CMS-HCC)- (present on admission)   Assessment & Plan    BUN/Cr 20/2.13 on admission. Baseline 1.2  Straight cath required for urinary retention.  Plan:  Hold lisinopril  Hold xeralto until LINDSEY resolves. Anticoagulation with weight based heparin   Urine lytes pending        Chronic pulmonary embolism (CMS-HCC)- (present on admission)   Assessment & Plan    Chronically anticoagulated on xeralto. Will hold for now due to LINDSEY  Anticoagulation with wt based  heparin until LINDSEY resolves        Anemia   Assessment & Plan    Hgb 10.4 on admission. Appears to be at baseline.   No prior iron panel  Iron panel, ferritin, B12, Folate, pending        Cellulitis and abscess of lower extremity   Assessment & Plan    Patient is seen by wound care as outpatient for lower extremity cellulitis   Wound consult placed  Started on Unasyn        Paroxysmal atrial fibrillation (CMS-[facility])- (present on admission)   Assessment & Plan    Sinus tachycardia on EKG this admission  On xeralto at home. Held on admission for LINDSEY  Anticoagulation with wt based heparin  Monitor on Telemetry              Anticipated Hospital stay:  >2 midnights        Quality Measures    Reviewed items::  EKG reviewed, Labs reviewed, Medications reviewed and Radiology images reviewed  Muniz catheter::  No Muniz  DVT prophylaxis pharmacological::  Heparin  Antibiotics:  Treating active infection/contamination beyond 24 hours perioperative coverage

## 2017-12-31 NOTE — ASSESSMENT & PLAN NOTE
- acute phase resolved  - BUN/Cr 20/2.13 on admission. Baseline 33/1.23 Nov 2017  - last BUN/Creat  1/1/18 - 38/1.69  - no labs since, as patient was irritable with repeat sticks, now on Hospice  Plan:  - Hospice

## 2017-12-31 NOTE — ASSESSMENT & PLAN NOTE
- 2nd admission in the past 2 months with COPD exacerbation  - treated with RT, oxygen, prednisone  - resolved  - back to baseline needing 4 lts oxygen (is on 4 lts at home)  - accepted by hospice  - Code status : DNR, DNI  Plan:  - continue COPD meds  - home with home hospice

## 2018-01-01 PROBLEM — L03.116 CELLULITIS OF BOTH LOWER EXTREMITIES: Status: ACTIVE | Noted: 2017-12-31

## 2018-01-01 PROBLEM — L03.115 CELLULITIS OF BOTH LOWER EXTREMITIES: Status: ACTIVE | Noted: 2017-12-31

## 2018-01-01 LAB
ALBUMIN SERPL BCP-MCNC: 3.4 G/DL (ref 3.2–4.9)
ALBUMIN/GLOB SERPL: 1 G/DL
ALP SERPL-CCNC: 51 U/L (ref 30–99)
ALT SERPL-CCNC: 9 U/L (ref 2–50)
ANION GAP SERPL CALC-SCNC: 9 MMOL/L (ref 0–11.9)
APTT PPP: 48.8 SEC (ref 24.7–36)
APTT PPP: 78.5 SEC (ref 24.7–36)
AST SERPL-CCNC: 12 U/L (ref 12–45)
BACTERIA UR CULT: NORMAL
BILIRUB SERPL-MCNC: 0.4 MG/DL (ref 0.1–1.5)
BUN SERPL-MCNC: 38 MG/DL (ref 8–22)
CALCIUM SERPL-MCNC: 9.2 MG/DL (ref 8.5–10.5)
CHLORIDE SERPL-SCNC: 95 MMOL/L (ref 96–112)
CO2 SERPL-SCNC: 28 MMOL/L (ref 20–33)
CREAT SERPL-MCNC: 1.69 MG/DL (ref 0.5–1.4)
EST. AVERAGE GLUCOSE BLD GHB EST-MCNC: 134 MG/DL
GFR SERPL CREATININE-BSD FRML MDRD: 40 ML/MIN/1.73 M 2
GLOBULIN SER CALC-MCNC: 3.3 G/DL (ref 1.9–3.5)
GLUCOSE SERPL-MCNC: 151 MG/DL (ref 65–99)
HBA1C MFR BLD: 6.3 % (ref 0–5.6)
MAGNESIUM SERPL-MCNC: 2 MG/DL (ref 1.5–2.5)
POTASSIUM SERPL-SCNC: 4.2 MMOL/L (ref 3.6–5.5)
PROT SERPL-MCNC: 6.7 G/DL (ref 6–8.2)
SIGNIFICANT IND 70042: NORMAL
SITE SITE: NORMAL
SODIUM SERPL-SCNC: 132 MMOL/L (ref 135–145)
SOURCE SOURCE: NORMAL

## 2018-01-01 PROCEDURE — 700102 HCHG RX REV CODE 250 W/ 637 OVERRIDE(OP): Performed by: STUDENT IN AN ORGANIZED HEALTH CARE EDUCATION/TRAINING PROGRAM

## 2018-01-01 PROCEDURE — 85730 THROMBOPLASTIN TIME PARTIAL: CPT

## 2018-01-01 PROCEDURE — 700111 HCHG RX REV CODE 636 W/ 250 OVERRIDE (IP): Performed by: INTERNAL MEDICINE

## 2018-01-01 PROCEDURE — 94760 N-INVAS EAR/PLS OXIMETRY 1: CPT

## 2018-01-01 PROCEDURE — 94640 AIRWAY INHALATION TREATMENT: CPT

## 2018-01-01 PROCEDURE — 770020 HCHG ROOM/CARE - TELE (206)

## 2018-01-01 PROCEDURE — 83735 ASSAY OF MAGNESIUM: CPT

## 2018-01-01 PROCEDURE — 36415 COLL VENOUS BLD VENIPUNCTURE: CPT

## 2018-01-01 PROCEDURE — 80053 COMPREHEN METABOLIC PANEL: CPT

## 2018-01-01 PROCEDURE — A9270 NON-COVERED ITEM OR SERVICE: HCPCS | Performed by: STUDENT IN AN ORGANIZED HEALTH CARE EDUCATION/TRAINING PROGRAM

## 2018-01-01 PROCEDURE — 99233 SBSQ HOSP IP/OBS HIGH 50: CPT | Mod: GC | Performed by: INTERNAL MEDICINE

## 2018-01-01 PROCEDURE — 770006 HCHG ROOM/CARE - MED/SURG/GYN SEMI*

## 2018-01-01 PROCEDURE — 700102 HCHG RX REV CODE 250 W/ 637 OVERRIDE(OP): Performed by: INTERNAL MEDICINE

## 2018-01-01 PROCEDURE — A9270 NON-COVERED ITEM OR SERVICE: HCPCS | Performed by: INTERNAL MEDICINE

## 2018-01-01 PROCEDURE — 700101 HCHG RX REV CODE 250: Performed by: INTERNAL MEDICINE

## 2018-01-01 RX ORDER — HYDROXYZINE HYDROCHLORIDE 25 MG/1
25 TABLET, FILM COATED ORAL 3 TIMES DAILY PRN
Status: DISCONTINUED | OUTPATIENT
Start: 2018-01-01 | End: 2018-01-01

## 2018-01-01 RX ORDER — QUETIAPINE FUMARATE 25 MG/1
25 TABLET, FILM COATED ORAL NIGHTLY PRN
Status: DISCONTINUED | OUTPATIENT
Start: 2018-01-01 | End: 2018-01-11 | Stop reason: HOSPADM

## 2018-01-01 RX ORDER — IPRATROPIUM BROMIDE AND ALBUTEROL SULFATE 2.5; .5 MG/3ML; MG/3ML
3 SOLUTION RESPIRATORY (INHALATION)
Status: DISCONTINUED | OUTPATIENT
Start: 2018-01-01 | End: 2018-01-04

## 2018-01-01 RX ADMIN — HEPARIN SODIUM 3800 UNITS: 1000 INJECTION, SOLUTION INTRAVENOUS; SUBCUTANEOUS at 01:12

## 2018-01-01 RX ADMIN — IPRATROPIUM BROMIDE AND ALBUTEROL SULFATE 3 ML: .5; 3 SOLUTION RESPIRATORY (INHALATION) at 07:27

## 2018-01-01 RX ADMIN — METOPROLOL TARTRATE 50 MG: 50 TABLET, FILM COATED ORAL at 08:42

## 2018-01-01 RX ADMIN — IPRATROPIUM BROMIDE AND ALBUTEROL SULFATE 3 ML: .5; 3 SOLUTION RESPIRATORY (INHALATION) at 02:00

## 2018-01-01 RX ADMIN — IPRATROPIUM BROMIDE AND ALBUTEROL SULFATE 3 ML: .5; 3 SOLUTION RESPIRATORY (INHALATION) at 19:02

## 2018-01-01 RX ADMIN — APIXABAN 5 MG: 5 TABLET, FILM COATED ORAL at 08:42

## 2018-01-01 RX ADMIN — PREDNISONE 40 MG: 20 TABLET ORAL at 08:42

## 2018-01-01 RX ADMIN — METOPROLOL TARTRATE 50 MG: 50 TABLET, FILM COATED ORAL at 21:04

## 2018-01-01 RX ADMIN — ATORVASTATIN CALCIUM 20 MG: 20 TABLET, FILM COATED ORAL at 08:43

## 2018-01-01 RX ADMIN — IPRATROPIUM BROMIDE AND ALBUTEROL SULFATE 3 ML: .5; 3 SOLUTION RESPIRATORY (INHALATION) at 15:23

## 2018-01-01 RX ADMIN — APIXABAN 5 MG: 5 TABLET, FILM COATED ORAL at 21:04

## 2018-01-01 RX ADMIN — BUDESONIDE AND FORMOTEROL FUMARATE DIHYDRATE 2 PUFF: 160; 4.5 AEROSOL RESPIRATORY (INHALATION) at 19:02

## 2018-01-01 RX ADMIN — BUDESONIDE AND FORMOTEROL FUMARATE DIHYDRATE 2 PUFF: 160; 4.5 AEROSOL RESPIRATORY (INHALATION) at 07:27

## 2018-01-01 RX ADMIN — HEPARIN SODIUM 1350 UNITS/HR: 5000 INJECTION, SOLUTION INTRAVENOUS at 01:12

## 2018-01-01 RX ADMIN — IPRATROPIUM BROMIDE AND ALBUTEROL SULFATE 3 ML: .5; 3 SOLUTION RESPIRATORY (INHALATION) at 12:00

## 2018-01-01 RX ADMIN — TAMSULOSIN HYDROCHLORIDE 0.4 MG: 0.4 CAPSULE ORAL at 08:43

## 2018-01-01 RX ADMIN — STANDARDIZED SENNA CONCENTRATE AND DOCUSATE SODIUM 2 TABLET: 8.6; 5 TABLET, FILM COATED ORAL at 08:42

## 2018-01-01 ASSESSMENT — ENCOUNTER SYMPTOMS
NERVOUS/ANXIOUS: 0
ORTHOPNEA: 1
SENSORY CHANGE: 0
NAUSEA: 0
SPUTUM PRODUCTION: 0
BRUISES/BLEEDS EASILY: 0
DOUBLE VISION: 0
CONSTIPATION: 0
WHEEZING: 1
POLYDIPSIA: 0
FALLS: 0
VOMITING: 0
BACK PAIN: 1
PALPITATIONS: 0
DIZZINESS: 0
WEAKNESS: 1
CHILLS: 0
SHORTNESS OF BREATH: 1
ABDOMINAL PAIN: 0
BLOOD IN STOOL: 0
COUGH: 1
FEVER: 0
HEADACHES: 0
BLURRED VISION: 1
MYALGIAS: 0
PHOTOPHOBIA: 0
FOCAL WEAKNESS: 0
SORE THROAT: 0
DIARRHEA: 0
MEMORY LOSS: 1

## 2018-01-01 ASSESSMENT — PATIENT HEALTH QUESTIONNAIRE - PHQ9
2. FEELING DOWN, DEPRESSED, IRRITABLE, OR HOPELESS: NOT AT ALL
1. LITTLE INTEREST OR PLEASURE IN DOING THINGS: NOT AT ALL
SUM OF ALL RESPONSES TO PHQ9 QUESTIONS 1 AND 2: 0
SUM OF ALL RESPONSES TO PHQ QUESTIONS 1-9: 0

## 2018-01-01 ASSESSMENT — LIFESTYLE VARIABLES
DO YOU DRINK ALCOHOL: NO
SUBSTANCE_ABUSE: 0

## 2018-01-01 ASSESSMENT — PAIN SCALES - GENERAL
PAINLEVEL_OUTOF10: 0

## 2018-01-01 NOTE — DISCHARGE SUMMARY
Internal Medicine Discharge Summary  Note Author: Alondra Poon M.D.       Admit Date:  12/30/2017       Discharge Date:   01/11/2018    Service:   R Internal Medicine Blue Team  Attending Physician(s):   Dr. Guillen    Senior Resident(s):   Dr. Calero   Ulisses Resident(s):   Dr. Poon       Primary Diagnosis:   Acute on chronic hypoxemic/hypercapneic respiratory failure secondary to exacerbation of COPD  Acute on CKD, renal function  Hypertension  Chronic back pain       Hospital Summary (Brief Narrative):           This 78 yo male with a PMHx of COPD, CHF, CKD, Chronic DVT/PE and paroxysmal AFib on xeralto, s/p intubation for acute on chronic respiratory failure due to COPD exacerbation or pneumonia in Nov 2017, was admitted with another episode of COPD exacerbation and acute on chronic renal impairment. CXR was notable for an enlarged cardiac silhouette. Troponin and BNP were normal. He was treated with DUONEBS, Respiratory therapy protocol, and a 5 day course of prednisone. Please see H&P for further details.    Hospital Course by Problem:    1. COPD Exacerbation : Treated with DUONEBS, Respiratory therapy protocol with incentive spirometry, oxygen and a 5 day course of Prednisone. Combivent and Symbicort were resumed. Patient expressed his wishes not to be intubated in case of respiratory deterioration, and has been made DNR, DNI following discussion with patient and family. He was reviewed and accepted by [facility]. Discharged home with Home Hospice and 4 lts of home oxygen following refusal by few skilled nursing facilities.     2. Acute on CKD : Likely pre-renal. Renal function improved (last BUN/Creat from 1/1/18 was 38/1.6), and patient refused further labs. In view of renal function, his xarelto was switched to heparin infusion, and later to Eliquis 5 mg BID on which he was discharged.     3. Hypertension : His BP initially was 160-180/. In view of intermittent mild headaches  and being on anti-coagulation, he was initially tried on escalating doses of metoprolol and amlodipine. His BP was controlled at 130-150/70s-80s with 10 mg Amlodipine/day and Coreg 12.5 mg BID. He was discharged on the same. ACEI was not commenced in view of his renal impairment.     4. Chronic Back Pain : Prior lumbar spinal surgeries for disc problems, chronic lumbar pain for which he is on gabapentin and Morphine at home. His pain was refractory to tylenol, gabapentin, lidocaine gel and lidocaine patch. NSAIDs were avoided in view of CKD. He was hence advised to [facility].    5. Placement : Family expressed concerns about Mr. Calzada returning home and not being able to manage and felt that he might benefit from a nursing facility. Request for placement in a SNF has been declined by multiple SNFs. He was hence discharged home on Home Hospice.    6. Goals of Care : Following extensive discussion with patient and family, patient opted to be made DNR, DNI, accepted by Hospice. As per Hospice request, he was discharged on 10 tabs of Ativan Q2hrly-PRN until further assessment by hospice physicians.          Patient /Hospital Summary (Details -- Problem Oriented) :          * COPD with exacerbation (CMS-HCC)   Assessment & Plan    - 2nd admission in the past 2 months with COPD exacerbation  - treated with RT, oxygen, prednisone  - resolved  - back to baseline needing 4 lts oxygen (is on 4 lts at home)  - accepted by hospice  - Code status : DNR, DNI  Plan:  - continue COPD meds  - home with home hospice          Hypertension   Assessment & Plan    - CKD stage 3-4  - BP better controlled on Coreg 12.5 mg BID and amlodipine 10 mg daily  Plan  - ctm        Paroxysmal atrial fibrillation (CMS-HCC)   Assessment & Plan    - Sinus tachycardia on EKG at admission  - On xeralto at home - held on admission for LINDSEY  - Anticoagulation switched to eliquis prior to discharge  - Echo Nov 2017 : LV EF 60%,  RVSP 16 mm Hg  Plan  - Eliquis (continued to prevent embolic CVA)          Acute-on-chronic kidney injury (CMS-HCC)   Assessment & Plan    - acute phase resolved  - BUN/Cr 20/2.13 on admission. Baseline 33/1.23 Nov 2017  - last BUN/Creat  1/1/18 - 38/1.69  - no labs since, as patient was irritable with repeat sticks, now on Hospice  Plan:  - Hospice        Chronic pulmonary embolism (CMS-HCC)   Assessment & Plan    - Chronically anticoagulated on xeralto - held on admission due to LINDSEY   - was anti-coagulated with wt based heparin which was later transitioned to eliquis   Plan  - Eliquis 5 mg BID        Chronic low back pain   Assessment & Plan    - chronic   - uses gabapentin, MS Contin 15 mg TID at home  - pain refractory to tylenol, lidocaine and gabapentin  - pain improved after re-commencing home morphine  - ctm        Anemia   Assessment & Plan    - Hgb 10.4 on admission - at baseline  - Iron low 14, TIBC 288, % sat 5, ferritin normal 111.2  - likely due to anemia of chronic disease  - not for erythropoeitin as Hb >9          Chronic cellulitis   Assessment & Plan    - B/L LE  - signed off by wound care  - no e/o active infecttion  - ctm              Consultants:     None    Procedures:        None    Imaging/ Testing:      DX-CHEST-PORTABLE (1 VIEW)   Final Result      No significant change      DX-CHEST-PORTABLE (1 VIEW)   Final Result      Enlarged cardiac silhouette.          Discharge Medications:         Medication Reconciliation: Completed       Medication List      START taking these medications      Instructions   acetaminophen 325 MG Tabs  Commonly known as:  TYLENOL   Take 2 Tabs by mouth every four hours as needed ( ).  Dose:  650 mg     amlodipine 10 MG Tabs  Start taking on:  1/12/2018  Commonly known as:  NORVASC   Take 1 Tab by mouth every day.  Dose:  10 mg     apixaban 5mg Tabs  Commonly known as:  ELIQUIS   Take 1 Tab by mouth 2 Times a Day.  Dose:  5 mg     carvedilol 12.5 MG Tabs  Commonly  known as:  COREG   Take 1 Tab by mouth 2 times a day, with meals.  Dose:  12.5 mg     lidocaine 5 % Ptch  Commonly known as:  LIDODERM   Apply 1 Patch to skin as directed every 24 hours.  Dose:  1 Patch     lorazepam 0.5 MG Tabs  Commonly known as:  ATIVAN   Doctor's comments:  for acute / severe worsening of shortness of breath - as requested by [facility] staff until their MDs can review patient. Discussed with senior resident and attending and ok to give for 3 days  Take 1 Tab by mouth every 2 hours as needed (for acute / severe worsening of shortness of breath - as requested by [facility] staff) for up to 3 days.  Dose:  0.5 mg        CONTINUE taking these medications      Instructions   albuterol-ipratropium  MCG/ACT Aero  Commonly known as:  COMBIVENT   Inhale 2 Puffs by mouth 4 times a day.  Dose:  2 Puff     budesonide-formoterol 160-4.5 MCG/ACT Aero  Commonly known as:  SYMBICORT   Inhale 2 Puffs by mouth 2 Times a Day.  Dose:  2 Puff     gabapentin 300 MG Caps  Commonly known as:  NEURONTIN   Take 300 mg by mouth every bedtime.  Dose:  300 mg     morphine 15 MG tablet  Commonly known as:  MS IR   Take 15 mg by mouth every 8 hours as needed for Severe Pain.  Dose:  15 mg     tamsulosin 0.4 MG capsule  Start taking on:  1/12/2018  Commonly known as:  FLOMAX   Take 1 Cap by mouth ONE-HALF HOUR AFTER BREAKFAST.  Dose:  0.4 mg        STOP taking these medications    rivaroxaban 20 MG Tabs tablet  Commonly known as:  XARELTO              Disposition:   Home with Home Hospice    Diet:   As tolerated    Activity:   As tolerated, slow ambulation    Instructions:         The patient was instructed to return to the ER in the event of worsening symptoms. I have counseled the patient on the importance of compliance and the patient has agreed to proceed with all medical recommendations and follow up plan indicated above.   The patient understands that all medications come with benefits and risks. Risks  may include permanent injury or death and these risks can be minimized with close reassessment and monitoring.        Primary Care Provider:   [facility]  Discharge summary faxed to primary care provider:  Completed  Copy of discharge summary given to the patient: Deferred      Follow up appointment details :      [facility]  [facility] - Dr. Alva on Jan 22nd, 2018    Pending Studies:        None    Time spent on discharge day patient visit, preparing discharge paperwork and arranging for patient follow up.    Summary of follow up issues:   Symptomatic treatment  BP monitoring    Discharge Time (Minutes) :    60 mins  Hospital Course Type:  Inpatient Stay >2 midnights      Condition on Discharge    ______________________________________________________________________    Interval history/exam for day of discharge:      Patient well, ambulating with minimal / no assistance.     Vitals:    01/01/18 0201 01/01/18 0400 01/01/18 0728 01/01/18 0756   BP:  (!) 167/71  (!) 176/75   Pulse: 99 100 97 100   Resp: 18 20 20 20   Temp:  36.4 °C (97.6 °F)  36.4 °C (97.6 °F)   SpO2: 94% 90% 94% 90%   Weight:       Height:         Weight/BMI: Body mass index is 34.84 kg/m².  Pulse Oximetry: 90 %, O2 (LPM): 4, O2 Delivery: Silicone Nasal Cannula    General: well, on 4 lts oxygen  CVS: S1, S2  PULM: wheezing resolved, clear to ausculate  ABD : soft, non-tender    Most Recent Labs:    Lab Results   Component Value Date/Time    WBC 8.5 12/31/2017 09:39 AM    RBC 3.46 (L) 12/31/2017 09:39 AM    HEMOGLOBIN 10.0 (L) 12/31/2017 09:39 AM    HEMATOCRIT 32.4 (L) 12/31/2017 09:39 AM    MCV 93.6 12/31/2017 09:39 AM    MCH 28.9 12/31/2017 09:39 AM    MCHC 30.9 (L) 12/31/2017 09:39 AM    MPV 8.8 (L) 12/31/2017 09:39 AM    NEUTSPOLYS 89.10 (H) 12/31/2017 09:39 AM    LYMPHOCYTES 8.10 (L) 12/31/2017 09:39 AM    MONOCYTES 2.10 12/31/2017 09:39 AM    EOSINOPHILS 0.00 12/31/2017 09:39 AM     BASOPHILS 0.00 12/31/2017 09:39 AM    ANISOCYTOSIS 1+ 12/30/2017 05:54 PM      Lab Results   Component Value Date/Time    SODIUM 132 (L) 01/01/2018 12:07 AM    POTASSIUM 4.2 01/01/2018 12:07 AM    CHLORIDE 95 (L) 01/01/2018 12:07 AM    CO2 28 01/01/2018 12:07 AM    GLUCOSE 151 (H) 01/01/2018 12:07 AM    BUN 38 (H) 01/01/2018 12:07 AM    CREATININE 1.69 (H) 01/01/2018 12:07 AM    CREATININE 1.1 09/27/2005 04:15 AM      Lab Results   Component Value Date/Time    ALTSGPT 9 01/01/2018 12:07 AM    ASTSGOT 12 01/01/2018 12:07 AM    ALKPHOSPHAT 51 01/01/2018 12:07 AM    TBILIRUBIN 0.4 01/01/2018 12:07 AM    LIPASE 3 (L) 12/30/2017 05:54 PM    ALBUMIN 3.4 01/01/2018 12:07 AM    GLOBULIN 3.3 01/01/2018 12:07 AM    INR 1.34 (H) 12/30/2017 05:54 PM     Lab Results   Component Value Date/Time    PROTHROMBTM 16.3 (H) 12/30/2017 05:54 PM    INR 1.34 (H) 12/30/2017 05:54 PM

## 2018-01-01 NOTE — PROGRESS NOTES
"Patient becoming increasingly agitated stating \"I want to leave. You guys cant keep me here\". MD notified. Family at bedside. As patient is confused cannot leave AMA at this time. Family informed.   "

## 2018-01-01 NOTE — PROGRESS NOTES
Family at bedside, patient agitated, pulled Iv, restless in room. Redirected back to bed. Blinds closed, lights turned off, calming environment provided and patient encouraged to nap as he states he did not sleep last night.

## 2018-01-01 NOTE — PROGRESS NOTES
Checked on patient. Patient states he took a nap and feels better now. Still confused at times, but mentation appears improved and is currently pleasant and cooperative, no longer agitated. Will continue to monitor. Patient in hospital attire and chair alarm in place.

## 2018-01-01 NOTE — DISCHARGE PLANNING
Medical Social Work    Referral: Hospice    Intervention: SW spoke with pt regarding hospice choice. Pt signed choice form for [facility]. Copy of choice form sent to [facility] and given to pt, original placed in pt's chart. Pt gave consent for SW to update his family regarding his plan and requested SW have his dtr, Magalie, contact him.    LORENE spoke with pt's dtr Magalie and sisters and updated with above information. Per Magalie, pt is living with his dtr Araceli, however Araceli is unable to care for pt due to her own health issues. Per Magalie, pt is unable to walk on his own and goes in and out of orientation. LORENE discussed the potential of SNF with hospice and Group Home with Hospice if SNF is not an option. SNF with hospice will depend on whether or [facility]. Magalie advised that she is POA for pt and will provide that documentation to the hospital as well as pt's POLST.     LORENE provided Magalie with information on group homes and the HCBW program through the state.     LORENE left  for Heide with Katelynn requesting a return call regarding pt.     Plan: Waiting on accepting hospice and placement for pt.

## 2018-01-01 NOTE — CARE PLAN
Problem: Communication  Goal: The ability to communicate needs accurately and effectively will improve  Outcome: PROGRESSING AS EXPECTED  Patient educated on medications, procedures and use of call light. Patient will continue to verbalize and improve on education and communication in the plan of care.      Problem: Safety  Goal: Will remain free from injury  Outcome: PROGRESSING AS EXPECTED  Educated patient on use of call light, no slip socks on, bed lowest position. All needs attended to. Patient verbalized understanding.

## 2018-01-01 NOTE — PROGRESS NOTES
"Pt requested his IV's be pulled out, so that he may get ready to leave.  This nurse explained to Pt that I cannot discontinue his Heparin drip without Dr's order.  Pt stated \"I going to rip this dam things out.  I don't need you!!!\".  Called [facility] team and explained the situation.  Dr will be in to talk with Pt.    "

## 2018-01-01 NOTE — PROGRESS NOTES
Internal Medicine Interval Note  Note Author: Alondra Poon M.D.     Name MARSHAL Calzada       1940   Age/Sex 77 y.o. male   MRN [id number]   Code Status  DNR, DNI     After 5PM or if no immediate response to page, please call for cross-coverage  Attending/Team: Dr. Quach/ David See Patient List for primary contact information  Call [phone] to page    1st Call - Day Intern (R1):   Dr. Poon 2nd Call - Day Sr. Resident (R2/R3):   Dr. Ortega         Reason for interval visit  (Principal Problem)   COPD (chronic obstructive pulmonary disease) with acute bronchitis (CMS-[facility])    Interval Problem Daily Status Update  (24 hours)     77 year old male with complex medical history presents with acute on chronic hypoxic and hypercapnic respiratory failure likely secondary to COPD exacerbation, re-admission following discharge for the same in 2017 requiring intubation    - pt was confused and agitated this am 5.30-5.50am, wanting to leave AMA  - was pleasant and cooperative on pre-rounds and brothers rounds  - he understands the severity and prognosis of his respiratory condition and wants to be discharged home so he can spend the rest of the time he has left at home with family  - explained need or Hospice review while in-patient to try prevent readmissions, pt agreeable  - patient doesn't like IV drips. Heparin discontinued and started on Eliquis (instead of Xarelto) in view of worsening renal function  - patient pulled on IV cannula  - Hospice team consulted, they'll review him today/tomorrow. SW will work with patient re: choice form for Hospice  - D'ed with  --> [facility], and submit choice form to Hospice. SNF referral placed  - a/w wound care and hospice    - Dr. Quach, senior resident and intern spoke to family --> care-giver exhaustion, family prefers placement. Patient and family agreed not for DNA and DNI. To continue all current medication, but not for invasive  intervention, no daily labs. Aim to keep patient comfortable. family is discussing placement options with       Review of Systems   Constitutional: Positive for malaise/fatigue. Negative for chills and fever.   HENT: Negative for congestion, hearing loss, nosebleeds, sore throat and tinnitus.    Eyes: Positive for blurred vision. Negative for double vision and photophobia.        Chronic blurred vision, wears glasses   Respiratory: Positive for cough, shortness of breath and wheezing. Negative for sputum production.    Cardiovascular: Positive for orthopnea and leg swelling. Negative for chest pain and palpitations.   Gastrointestinal: Negative for abdominal pain, blood in stool, constipation, diarrhea, melena, nausea and vomiting.   Genitourinary: Negative for dysuria, frequency and urgency.   Musculoskeletal: Positive for back pain. Negative for falls and myalgias.   Skin: Negative for itching and rash.   Neurological: Positive for weakness. Negative for dizziness, sensory change, focal weakness and headaches.   Endo/Heme/Allergies: Negative for polydipsia. Does not bruise/bleed easily.   Psychiatric/Behavioral: Positive for memory loss. Negative for substance abuse. The patient is not nervous/anxious.        Consultants/Specialty  Hospice    Disposition  Inpatient treatment for COPD exacerbation, family and patient considering hospice     Quality Measures    Reviewed items::  EKG reviewed, Labs reviewed, Medications reviewed and Radiology images reviewed  Muniz catheter::  No Muniz  DVT: heparin discontinued, eliquis started.          Physical Exam       Vitals:    01/01/18 0201 01/01/18 0400 01/01/18 0728 01/01/18 0756   BP:  (!) 167/71  (!) 176/75   Pulse: 99 100 97 100   Resp: 18 20 20 20   Temp:  36.4 °C (97.6 °F)  36.4 °C (97.6 °F)   SpO2: 94% 90% 94% 90%   Weight:       Height:         Body mass index is 34.84 kg/m². Weight: 107 kg (235 lb 14.3 oz)  Oxygen Therapy:  Pulse Oximetry: 90 %, O2  (LPM): 4, O2 Delivery: Silicone Nasal Cannula    Physical Exam   Constitutional:   Obese male,  in no acute distress, sitting in chair with 4 lts oxygen via NC   HENT:   Head: Normocephalic and atraumatic.   Mouth/Throat: Oropharynx is clear and moist. No oropharyngeal exudate.   Eyes: Conjunctivae and EOM are normal. Pupils are equal, round, and reactive to light. No scleral icterus.   Neck: No JVD present.   Large neck circumference, no obvious JVD   Cardiovascular: Normal rate, regular rhythm, normal heart sounds and intact distal pulses.  Exam reveals no gallop and no friction rub.    No murmur heard.  Pulmonary/Chest: No stridor. No respiratory distress. He has wheezes. He has rales. He exhibits no tenderness.   Has oxygen via NC, on 4 lts, diffuse inspiratory and expiratory wheezing   Abdominal: Soft. Bowel sounds are normal. There is no tenderness. There is no rebound and no guarding.   Obese abdomen, well healed surgical scars   Musculoskeletal: He exhibits tenderness. He exhibits no deformity.   1+ pitting edema bilaterally. LE bandaged   Lymphadenopathy:     He has no cervical adenopathy.   Neurological: He is alert. He displays normal reflexes. No cranial nerve deficit. He exhibits normal muscle tone.   Oriented x 2. Strength 4/4 RUE, R/LLE. 3/4 in LUE   Skin: Skin is warm and dry. No rash noted.   B/L LE with signs of chronic cellulitis, no signs of superinfection   Psychiatric: Affect and judgment normal.   Poor memory         Lab Data Review:         12/31/2017  12:07 PM    Recent Labs      12/30/17   1754  12/30/17   1835  12/31/17   0939  01/01/18   0007   SODIUM  134*   --   135  132*   POTASSIUM  4.7   --   4.9  4.2   CHLORIDE  97   --   98  95*   CO2  31   --   30  28   BUN  22   --   29*  38*   CREATININE  2.13*   --   1.64*  1.69*   MAGNESIUM   --   1.9   --   2.0   CALCIUM  9.2   --   9.3  9.2       Recent Labs      12/30/17   1754  12/31/17   0939  01/01/18   0007   ALTSGPT  8   --   9      ASTSGOT  15   --   12   ALKPHOSPHAT  74   --   51   TBILIRUBIN  0.7   --   0.4   LIPASE  3*   --    --    GLUCOSE  118*  139*  151*       Recent Labs      12/30/17   1754  12/30/17   1835  12/31/17   0939   12/31/17   1543  01/01/18   0008  01/01/18   0710   RBC  3.68*   --   3.46*   --    --    --    --    HEMOGLOBIN  10.4*   --   10.0*   --    --    --    --    HEMATOCRIT  34.8*   --   32.4*   --    --    --    --    PLATELETCT  264   --   230   --    --    --    --    PROTHROMBTM  16.3*   --    --    --    --    --    --    APTT  31.3   --    --    < >  212.0*  48.8*  78.5*   INR  1.34*   --    --    --    --    --    --    IRON   --   14*   --    --    --    --    --    FERRITIN  111.2   --    --    --    --    --    --    TOTIRONBC   --   288   --    --    --    --    --     < > = values in this interval not displayed.       Recent Labs      12/30/17   1754  12/31/17   0939  01/01/18   0007   WBC  12.8*  8.5   --    NEUTSPOLYS  79.00*  89.10*   --    LYMPHOCYTES  12.10*  8.10*   --    MONOCYTES  6.20  2.10   --    EOSINOPHILS  2.00  0.00   --    BASOPHILS  0.40  0.00   --    ASTSGOT  15   --   12   ALTSGPT  8   --   9   ALKPHOSPHAT  74   --   51   TBILIRUBIN  0.7   --   0.4           Assessment/Plan     * COPD (chronic obstructive pulmonary disease) with acute bronchitis (CMS-HCC)   Assessment & Plan    - 1-2 days of difficulty breathing and increased somnolence prior to admission. Concern for AMS on admission, now improved although pt appears to have some cognitive impairment  - Admitted in November 2017 for acute on chronic respiratory failure. PCO2 70-80 on that admission required intubation   Flu negative.  - ABG showed pCO2 69.8. Bicarb 31  - ABG 12/31/17 --> pCO2 48.6,  PO2 62.8, pH 7.38  - Elevated WBC on admission, now normal   - Unasyn d/c 12/31/17, pro-calc was negative and no evidence for infection   - 12/31/17 : Code status discussed with patient and daughter. Daughter states that patient would  not want chest compressions. But intubation would be ok for a short time on admission, family still agree at this time   Pt does not want to be hospitalized for his COPD anymore, agreeable to hospice consult, family contacted   Plan:  - gabapentin and MS contin held as these effect mental status, and opiates can worsen oxygenation   - continue RT protocol, ipratropium, levalbuterol (as pt is already tachycardic) while I/P  - continue Prednisone 40mg daily  - discussed with hospice --> unsure if hey can review him today  - [facility]            LINDSEY (acute kidney injury) (CMS-HCC)- (present on admission)   Assessment & Plan    - BUN/Cr 20/2.13 on admission. Baseline 1.2  - was 33/1.23 prior to discharge in Nov 2017  - Straight cath required for urinary retention on 12/30/17 - ? Residual  - BUN/Creat today 1/1/18 - 38/1.69  - Urine lytes normal  Plan:  - Lisinopril held  - xarelto switched to heparin --> eliquis  - bladder scan  - not for further intervention/investigations        Chronic pulmonary embolism (CMS-HCC)- (present on admission)   Assessment & Plan    - Chronically anticoagulated on xeralto for PEs. Will hold for now due to LINDSEY  - was on anti-coagulation with wt based heparin - d/c'ed this am, as patient doesn't want/like being attached to the drip  - Wells score 4.5, moderate risk for PE however given improvement of hypoxia, MS and HR and patient already on anticoagulation will not complete imaging or D-dimer.   - Renal function worse today 1/1/18, and pt not keen on heparin drip  Plan  - heparin d/c'ed  - start Eliquis        Anemia   Assessment & Plan    - Hgb 10.4 on admission.  at baseline.   - Iron low 14, TIBC 288, % sat 5, ferritin normal 111.2. Possible secondary to anemia of chronic disease.   - FOBT pending        Cellulitis of both lower extremities   Assessment & Plan    - followed by wound care as outpatient for B/L LE cellulitis   - Wound consult a/w'ed  - Started on Unasyn on admission,  discontinued as exam consistent with chronic cellulitis  - afebrile, WBC now normal   - ctm        Paroxysmal atrial fibrillation (CMS-HCC)- (present on admission)   Assessment & Plan    - Sinus tachycardia on EKG this admission  - On xeralto at home. Held on admission for LINDSEY  - Anticoagulation switched from heparin to eliquis   - Echo NOv 2017 : LV EF 60%, RVSP 16 mm Hg

## 2018-01-01 NOTE — WOUND TEAM
Wound consult placed for evaluation of bilateral lower extremities.  Staff RN confers that skin is intact and red and flaky due to edema.  Instructed to apply pink cap cream to lower extremities by nursing.  No involvement by wound team indicated at this time.

## 2018-01-01 NOTE — CARE PLAN
Problem: Communication  Goal: The ability to communicate needs accurately and effectively will improve  Outcome: PROGRESSING SLOWER THAN EXPECTED      Problem: Safety  Goal: Will remain free from injury  Outcome: PROGRESSING AS EXPECTED

## 2018-01-01 NOTE — DISCHARGE PLANNING
CCS received a Hospice choice form Per the choice form the referral has been sent to [facility]

## 2018-01-02 PROCEDURE — A9270 NON-COVERED ITEM OR SERVICE: HCPCS | Performed by: INTERNAL MEDICINE

## 2018-01-02 PROCEDURE — 700101 HCHG RX REV CODE 250: Performed by: HOSPITALIST

## 2018-01-02 PROCEDURE — 700102 HCHG RX REV CODE 250 W/ 637 OVERRIDE(OP): Performed by: HOSPITALIST

## 2018-01-02 PROCEDURE — 700111 HCHG RX REV CODE 636 W/ 250 OVERRIDE (IP): Performed by: INTERNAL MEDICINE

## 2018-01-02 PROCEDURE — A9270 NON-COVERED ITEM OR SERVICE: HCPCS | Performed by: HOSPITALIST

## 2018-01-02 PROCEDURE — 99232 SBSQ HOSP IP/OBS MODERATE 35: CPT | Mod: GC | Performed by: INTERNAL MEDICINE

## 2018-01-02 PROCEDURE — 94640 AIRWAY INHALATION TREATMENT: CPT

## 2018-01-02 PROCEDURE — A9270 NON-COVERED ITEM OR SERVICE: HCPCS | Performed by: STUDENT IN AN ORGANIZED HEALTH CARE EDUCATION/TRAINING PROGRAM

## 2018-01-02 PROCEDURE — 770001 HCHG ROOM/CARE - MED/SURG/GYN PRIV*

## 2018-01-02 PROCEDURE — 94760 N-INVAS EAR/PLS OXIMETRY 1: CPT

## 2018-01-02 PROCEDURE — 700101 HCHG RX REV CODE 250: Performed by: STUDENT IN AN ORGANIZED HEALTH CARE EDUCATION/TRAINING PROGRAM

## 2018-01-02 PROCEDURE — 700102 HCHG RX REV CODE 250 W/ 637 OVERRIDE(OP): Performed by: STUDENT IN AN ORGANIZED HEALTH CARE EDUCATION/TRAINING PROGRAM

## 2018-01-02 PROCEDURE — 700102 HCHG RX REV CODE 250 W/ 637 OVERRIDE(OP): Performed by: INTERNAL MEDICINE

## 2018-01-02 RX ORDER — ONDANSETRON 4 MG/1
TABLET, ORALLY DISINTEGRATING ORAL
Status: COMPLETED
Start: 2018-01-02 | End: 2018-01-02

## 2018-01-02 RX ORDER — AMLODIPINE BESYLATE 5 MG/1
5 TABLET ORAL
Status: DISCONTINUED | OUTPATIENT
Start: 2018-01-02 | End: 2018-01-03

## 2018-01-02 RX ORDER — BUDESONIDE AND FORMOTEROL FUMARATE DIHYDRATE 160; 4.5 UG/1; UG/1
2 AEROSOL RESPIRATORY (INHALATION) 2 TIMES DAILY
Status: DISCONTINUED | OUTPATIENT
Start: 2018-01-03 | End: 2018-01-11 | Stop reason: HOSPADM

## 2018-01-02 RX ORDER — ACETAMINOPHEN 325 MG/1
650 TABLET ORAL EVERY 4 HOURS PRN
Status: DISCONTINUED | OUTPATIENT
Start: 2018-01-02 | End: 2018-01-11 | Stop reason: HOSPADM

## 2018-01-02 RX ORDER — LIDOCAINE 40 MG/G
1 CREAM TOPICAL ONCE
Status: COMPLETED | OUTPATIENT
Start: 2018-01-02 | End: 2018-01-02

## 2018-01-02 RX ORDER — ACETAMINOPHEN 650 MG/1
650 SUPPOSITORY RECTAL EVERY 4 HOURS PRN
Status: DISCONTINUED | OUTPATIENT
Start: 2018-01-02 | End: 2018-01-11 | Stop reason: HOSPADM

## 2018-01-02 RX ADMIN — STANDARDIZED SENNA CONCENTRATE AND DOCUSATE SODIUM 2 TABLET: 8.6; 5 TABLET, FILM COATED ORAL at 20:54

## 2018-01-02 RX ADMIN — QUETIAPINE FUMARATE 25 MG: 25 TABLET ORAL at 21:35

## 2018-01-02 RX ADMIN — APIXABAN 5 MG: 5 TABLET, FILM COATED ORAL at 08:12

## 2018-01-02 RX ADMIN — STANDARDIZED SENNA CONCENTRATE AND DOCUSATE SODIUM 2 TABLET: 8.6; 5 TABLET, FILM COATED ORAL at 08:12

## 2018-01-02 RX ADMIN — IPRATROPIUM BROMIDE AND ALBUTEROL SULFATE 3 ML: .5; 3 SOLUTION RESPIRATORY (INHALATION) at 11:55

## 2018-01-02 RX ADMIN — IPRATROPIUM BROMIDE AND ALBUTEROL SULFATE 3 ML: .5; 3 SOLUTION RESPIRATORY (INHALATION) at 08:59

## 2018-01-02 RX ADMIN — APIXABAN 5 MG: 5 TABLET, FILM COATED ORAL at 20:55

## 2018-01-02 RX ADMIN — BUDESONIDE AND FORMOTEROL FUMARATE DIHYDRATE 2 PUFF: 160; 4.5 AEROSOL RESPIRATORY (INHALATION) at 08:12

## 2018-01-02 RX ADMIN — BUDESONIDE AND FORMOTEROL FUMARATE DIHYDRATE 2 PUFF: 160; 4.5 AEROSOL RESPIRATORY (INHALATION) at 20:34

## 2018-01-02 RX ADMIN — PREDNISONE 40 MG: 20 TABLET ORAL at 08:12

## 2018-01-02 RX ADMIN — TAMSULOSIN HYDROCHLORIDE 0.4 MG: 0.4 CAPSULE ORAL at 08:12

## 2018-01-02 RX ADMIN — AMLODIPINE BESYLATE 5 MG: 5 TABLET ORAL at 20:55

## 2018-01-02 RX ADMIN — METOPROLOL TARTRATE 50 MG: 50 TABLET, FILM COATED ORAL at 08:12

## 2018-01-02 RX ADMIN — IPRATROPIUM BROMIDE AND ALBUTEROL SULFATE 3 ML: .5; 3 SOLUTION RESPIRATORY (INHALATION) at 20:34

## 2018-01-02 RX ADMIN — METOPROLOL TARTRATE 50 MG: 50 TABLET, FILM COATED ORAL at 20:54

## 2018-01-02 RX ADMIN — LIDOCAINE 1 APPLICATION: 40 CREAM TOPICAL at 21:01

## 2018-01-02 RX ADMIN — IPRATROPIUM BROMIDE AND ALBUTEROL SULFATE 3 ML: .5; 3 SOLUTION RESPIRATORY (INHALATION) at 15:28

## 2018-01-02 ASSESSMENT — PAIN SCALES - GENERAL
PAINLEVEL_OUTOF10: 0
PAINLEVEL_OUTOF10: 7
PAINLEVEL_OUTOF10: 7

## 2018-01-02 ASSESSMENT — ENCOUNTER SYMPTOMS
DIZZINESS: 0
MYALGIAS: 0
SORE THROAT: 0
HEADACHES: 0
NERVOUS/ANXIOUS: 0
BACK PAIN: 1
SPUTUM PRODUCTION: 0
CHILLS: 0
POLYDIPSIA: 0
BRUISES/BLEEDS EASILY: 0
DIARRHEA: 0
BLURRED VISION: 0
NAUSEA: 0
FOCAL WEAKNESS: 0
MEMORY LOSS: 1
FALLS: 0
DOUBLE VISION: 0
WHEEZING: 1
VOMITING: 0
SHORTNESS OF BREATH: 1
COUGH: 0
ORTHOPNEA: 1
PHOTOPHOBIA: 0
ABDOMINAL PAIN: 0
FEVER: 0
PALPITATIONS: 0
CONSTIPATION: 0
BLOOD IN STOOL: 0
SENSORY CHANGE: 0
WEAKNESS: 1

## 2018-01-02 ASSESSMENT — LIFESTYLE VARIABLES
DO YOU DRINK ALCOHOL: NO
SUBSTANCE_ABUSE: 0

## 2018-01-02 NOTE — DISCHARGE PLANNING
CCS received a SNF choice form. CCS spoke to LORENE Alvares the choice form was marked incorrectly. The patient has [facility] the referral has been sent to the SNF's contracted with the [facility]. [facility], [facility] and the [facility].

## 2018-01-02 NOTE — DISCHARGE PLANNING
[facility] received notification from [place]. The referral has been denied. The patient is not service connected.

## 2018-01-02 NOTE — DISCHARGE PLANNING
Medical Social Work    Per pts chart, pt needs a SNF referral. LORENE met with pt at bedside who requested that referrals be sent to 1 [facility] 2 [facility]. SW addressed all questions and encouraged follow up as needed. LORENE sent the choice form to [facility] and confirmed receipt of choice form. SW to monitor response status and follow up with care team.    SW provided education on pts Medicare rights and provided a copy of IMM to pt. SW addressed all questions and encouraged follow up as needed. LORENE placed the signed IMM in folder to get digitally scanned into pts medical record. SW to remain available.      Pt is [facility] connected, SW canceled [facility] and added [facility] and [facility].  [facility] notified.

## 2018-01-03 LAB
ERYTHROCYTE [DISTWIDTH] IN BLOOD BY AUTOMATED COUNT: 48.7 FL (ref 35.9–50)
HCT VFR BLD AUTO: 30.3 % (ref 42–52)
HGB BLD-MCNC: 9.9 G/DL (ref 14–18)
MCH RBC QN AUTO: 28.9 PG (ref 27–33)
MCHC RBC AUTO-ENTMCNC: 32.7 G/DL (ref 33.7–35.3)
MCV RBC AUTO: 88.3 FL (ref 81.4–97.8)
PLATELET # BLD AUTO: 241 K/UL (ref 164–446)
PMV BLD AUTO: 8.6 FL (ref 9–12.9)
RBC # BLD AUTO: 3.43 M/UL (ref 4.7–6.1)
WBC # BLD AUTO: 6.2 K/UL (ref 4.8–10.8)

## 2018-01-03 PROCEDURE — A9270 NON-COVERED ITEM OR SERVICE: HCPCS | Performed by: STUDENT IN AN ORGANIZED HEALTH CARE EDUCATION/TRAINING PROGRAM

## 2018-01-03 PROCEDURE — 85027 COMPLETE CBC AUTOMATED: CPT

## 2018-01-03 PROCEDURE — 99232 SBSQ HOSP IP/OBS MODERATE 35: CPT | Mod: GC | Performed by: INTERNAL MEDICINE

## 2018-01-03 PROCEDURE — 700102 HCHG RX REV CODE 250 W/ 637 OVERRIDE(OP): Performed by: INTERNAL MEDICINE

## 2018-01-03 PROCEDURE — 36415 COLL VENOUS BLD VENIPUNCTURE: CPT

## 2018-01-03 PROCEDURE — 94760 N-INVAS EAR/PLS OXIMETRY 1: CPT

## 2018-01-03 PROCEDURE — 770001 HCHG ROOM/CARE - MED/SURG/GYN PRIV*

## 2018-01-03 PROCEDURE — 700101 HCHG RX REV CODE 250: Performed by: HOSPITALIST

## 2018-01-03 PROCEDURE — 94640 AIRWAY INHALATION TREATMENT: CPT

## 2018-01-03 PROCEDURE — 700111 HCHG RX REV CODE 636 W/ 250 OVERRIDE (IP): Performed by: INTERNAL MEDICINE

## 2018-01-03 PROCEDURE — 700102 HCHG RX REV CODE 250 W/ 637 OVERRIDE(OP): Performed by: STUDENT IN AN ORGANIZED HEALTH CARE EDUCATION/TRAINING PROGRAM

## 2018-01-03 PROCEDURE — A9270 NON-COVERED ITEM OR SERVICE: HCPCS | Performed by: INTERNAL MEDICINE

## 2018-01-03 RX ORDER — LIDOCAINE 40 MG/G
1 CREAM TOPICAL PRN
Status: DISCONTINUED | OUTPATIENT
Start: 2018-01-03 | End: 2018-01-11 | Stop reason: HOSPADM

## 2018-01-03 RX ORDER — GABAPENTIN 300 MG/1
300 CAPSULE ORAL
Status: DISCONTINUED | OUTPATIENT
Start: 2018-01-03 | End: 2018-01-11 | Stop reason: HOSPADM

## 2018-01-03 RX ORDER — AMLODIPINE BESYLATE 10 MG/1
10 TABLET ORAL
Status: DISCONTINUED | OUTPATIENT
Start: 2018-01-03 | End: 2018-01-05

## 2018-01-03 RX ADMIN — APIXABAN 5 MG: 5 TABLET, FILM COATED ORAL at 21:00

## 2018-01-03 RX ADMIN — IPRATROPIUM BROMIDE AND ALBUTEROL SULFATE 3 ML: .5; 3 SOLUTION RESPIRATORY (INHALATION) at 15:31

## 2018-01-03 RX ADMIN — GABAPENTIN 300 MG: 300 CAPSULE ORAL at 21:00

## 2018-01-03 RX ADMIN — METOPROLOL TARTRATE 50 MG: 50 TABLET, FILM COATED ORAL at 21:00

## 2018-01-03 RX ADMIN — AMLODIPINE BESYLATE 10 MG: 10 TABLET ORAL at 07:29

## 2018-01-03 RX ADMIN — BUDESONIDE AND FORMOTEROL FUMARATE DIHYDRATE 2 PUFF: 160; 4.5 AEROSOL RESPIRATORY (INHALATION) at 21:03

## 2018-01-03 RX ADMIN — BUDESONIDE AND FORMOTEROL FUMARATE DIHYDRATE 2 PUFF: 160; 4.5 AEROSOL RESPIRATORY (INHALATION) at 09:00

## 2018-01-03 RX ADMIN — PREDNISONE 40 MG: 20 TABLET ORAL at 07:29

## 2018-01-03 RX ADMIN — TAMSULOSIN HYDROCHLORIDE 0.4 MG: 0.4 CAPSULE ORAL at 07:29

## 2018-01-03 RX ADMIN — IPRATROPIUM BROMIDE AND ALBUTEROL SULFATE 3 ML: .5; 3 SOLUTION RESPIRATORY (INHALATION) at 10:49

## 2018-01-03 RX ADMIN — METOPROLOL TARTRATE 50 MG: 50 TABLET, FILM COATED ORAL at 07:29

## 2018-01-03 RX ADMIN — IPRATROPIUM BROMIDE AND ALBUTEROL SULFATE 3 ML: .5; 3 SOLUTION RESPIRATORY (INHALATION) at 20:42

## 2018-01-03 RX ADMIN — APIXABAN 5 MG: 5 TABLET, FILM COATED ORAL at 07:29

## 2018-01-03 RX ADMIN — STANDARDIZED SENNA CONCENTRATE AND DOCUSATE SODIUM 2 TABLET: 8.6; 5 TABLET, FILM COATED ORAL at 07:29

## 2018-01-03 RX ADMIN — STANDARDIZED SENNA CONCENTRATE AND DOCUSATE SODIUM 2 TABLET: 8.6; 5 TABLET, FILM COATED ORAL at 21:00

## 2018-01-03 RX ADMIN — ACETAMINOPHEN 650 MG: 325 TABLET, FILM COATED ORAL at 06:58

## 2018-01-03 RX ADMIN — IPRATROPIUM BROMIDE AND ALBUTEROL SULFATE 3 ML: .5; 3 SOLUTION RESPIRATORY (INHALATION) at 07:10

## 2018-01-03 ASSESSMENT — ENCOUNTER SYMPTOMS
HEADACHES: 0
WHEEZING: 1
SENSORY CHANGE: 0
DIARRHEA: 0
COUGH: 0
FEVER: 0
DOUBLE VISION: 0
NAUSEA: 0
PHOTOPHOBIA: 0
FALLS: 0
FOCAL WEAKNESS: 0
BLURRED VISION: 0
BRUISES/BLEEDS EASILY: 0
MEMORY LOSS: 1
NERVOUS/ANXIOUS: 0
MYALGIAS: 0
CONSTIPATION: 0
VOMITING: 0
SORE THROAT: 0
BACK PAIN: 1
POLYDIPSIA: 0
WEAKNESS: 1
BLOOD IN STOOL: 0
SPUTUM PRODUCTION: 0
ABDOMINAL PAIN: 0
ORTHOPNEA: 1
CHILLS: 0
DIZZINESS: 0
PALPITATIONS: 0
SHORTNESS OF BREATH: 1

## 2018-01-03 ASSESSMENT — PAIN SCALES - GENERAL
PAINLEVEL_OUTOF10: 4
PAINLEVEL_OUTOF10: 5
PAINLEVEL_OUTOF10: 8
PAINLEVEL_OUTOF10: 5
PAINLEVEL_OUTOF10: 7
PAINLEVEL_OUTOF10: 5

## 2018-01-03 ASSESSMENT — LIFESTYLE VARIABLES: SUBSTANCE_ABUSE: 0

## 2018-01-03 NOTE — DISCHARGE PLANNING
CCS LM for Emerson at [facility] @0821.  CCS LM for Gabrielle at [facility] @0813.

## 2018-01-03 NOTE — CARE PLAN
Problem: Mobility  Goal: Risk for activity intolerance will decrease  Outcome: PROGRESSING AS EXPECTED  Pt is up w SBA +FWW; OOB for all meals; Ambulates >50ft 3x/day    Problem: Skin Integrity  Goal: Risk for impaired skin integrity will decrease  Outcome: PROGRESSING AS EXPECTED  Pt up to shower; RN cleaned legs, applied moisturizing cream, and redressed BLE wounds/cellulitis

## 2018-01-03 NOTE — CARE PLAN
Problem: Pain Management  Goal: Pain level will decrease to patient's comfort goal  Lidocaine cream was applied to lower back for pain rated at 7/10.  Patient resting comfortably in chair refusing to get in bed to sleep.

## 2018-01-03 NOTE — CARE PLAN
Problem: Knowledge Deficit  Goal: Knowledge of disease process/condition, treatment plan, diagnostic tests, and medications will improve  POC discussed. All questions and concerns addressed. Will update daughter of any changes.     Problem: Respiratory:  Goal: Respiratory status will improve  Ensuring pt is using IS correctly and frequently. Correct return demonstration given.

## 2018-01-03 NOTE — PROGRESS NOTES
Internal Medicine Interval Note  Note Author: Alondra Poon M.D.     Name MARSHAL Calzada       1940   Age/Sex 77 y.o. male   MRN [id number]   Code Status  DNR, DNI     After 5PM or if no immediate response to page, please call for cross-coverage  Attending/Team: Dr. Quach/ David See Patient List for primary contact information  Call [phone] to page    1st Call - Day Intern (R1):   Dr. Poon 2nd Call - Day Sr. Resident (R2/R3):   Dr. Ortega         Reason for interval visit  (Principal Problem)   COPD (chronic obstructive pulmonary disease) with acute bronchitis (CMS-[facility])    Interval Problem Daily Status Update  (24 hours)     77 year old male with complex medical history presents with acute on chronic hypoxic and hypercapnic respiratory failure likely secondary to COPD exacerbation, re-admission following discharge for the same in 2017 requiring intubation    - uneventful night  - off telemetry  - 97% on 4 lts oxygen  - a/w Hospice review --> patient chose [facility], LORENE faxed choice form to [facility] on 18. D'ed with [facility] team --> A/w [facility] review  - a/w SNF approval      Review of Systems   Constitutional: Positive for malaise/fatigue. Negative for chills and fever.   HENT: Negative for congestion, hearing loss, nosebleeds, sore throat and tinnitus.    Eyes: Negative for blurred vision, double vision and photophobia.        Chronic blurred vision, wears glasses   Respiratory: Positive for shortness of breath and wheezing. Negative for cough and sputum production.    Cardiovascular: Positive for orthopnea and leg swelling. Negative for chest pain and palpitations.   Gastrointestinal: Negative for abdominal pain, blood in stool, constipation, diarrhea, melena, nausea and vomiting.   Genitourinary: Negative for dysuria, frequency and urgency.   Musculoskeletal: Positive for back pain. Negative for falls and myalgias.   Skin: Negative for itching and rash.    Neurological: Positive for weakness. Negative for dizziness, sensory change, focal weakness and headaches.   Endo/Heme/Allergies: Negative for polydipsia. Does not bruise/bleed easily.   Psychiatric/Behavioral: Positive for memory loss. Negative for substance abuse. The patient is not nervous/anxious.        Consultants/Specialty  Hospice    Disposition  Inpatient treatment for COPD exacerbation, family and patient considering hospice     Quality Measures    Reviewed items::  EKG reviewed, Labs reviewed, Medications reviewed and Radiology images reviewed  Muniz catheter::  No Muniz  DVT: heparin discontinued, eliquis started.          Physical Exam       Vitals:    01/02/18 0823 01/02/18 0901 01/02/18 1157 01/02/18 1529   BP: (!) 182/91      Pulse: 83 68 84 83   Resp: 18 18 18 18   Temp: 36.3 °C (97.3 °F)      SpO2: 95% 96% 95% 97%   Weight:       Height:         Body mass index is 34.84 kg/m². Weight: 107 kg (235 lb 14.3 oz)  Oxygen Therapy:  Pulse Oximetry: 97 %, O2 (LPM): 4, O2 Delivery: Silicone Nasal Cannula    Physical Exam   Constitutional:   Obese male,  in no acute distress, sitting in chair with 4 lts oxygen via NC   HENT:   Head: Normocephalic and atraumatic.   Mouth/Throat: Oropharynx is clear and moist. No oropharyngeal exudate.   Eyes: Conjunctivae and EOM are normal. Pupils are equal, round, and reactive to light. No scleral icterus.   Neck: No JVD present.   Large neck circumference, no obvious JVD   Cardiovascular: Normal rate, regular rhythm, normal heart sounds and intact distal pulses.  Exam reveals no gallop and no friction rub.    No murmur heard.  Pulmonary/Chest: No stridor. No respiratory distress. He has wheezes. He has rales. He exhibits no tenderness.   Has oxygen via NC, on 4 lts, diffuse inspiratory and expiratory wheezing   Abdominal: Soft. Bowel sounds are normal. There is no tenderness. There is no rebound and no guarding.   Obese abdomen, well healed surgical scars    Musculoskeletal: He exhibits edema. He exhibits no tenderness or deformity.   1+ pitting edema bilaterally. LE bandaged   Lymphadenopathy:     He has no cervical adenopathy.   Neurological: He is alert. He displays normal reflexes. No cranial nerve deficit. He exhibits normal muscle tone.   Oriented x 2. Strength 4/4 RUE, R/LLE. 3/4 in LUE   Skin: Skin is warm and dry. No rash noted.   B/L LE with signs of chronic cellulitis, no signs of superinfection   Psychiatric: Affect and judgment normal.   Poor memory         Lab Data Review:         12/31/2017  12:07 PM    Recent Labs      12/30/17 1754 12/30/17   1835 12/31/17   0939  01/01/18   0007   SODIUM  134*   --   135  132*   POTASSIUM  4.7   --   4.9  4.2   CHLORIDE  97   --   98  95*   CO2  31   --   30  28   BUN  22   --   29*  38*   CREATININE  2.13*   --   1.64*  1.69*   MAGNESIUM   --   1.9   --   2.0   CALCIUM  9.2   --   9.3  9.2       Recent Labs      12/30/17 1754 12/31/17   0939  01/01/18   0007   ALTSGPT  8   --   9   ASTSGOT  15   --   12   ALKPHOSPHAT  74   --   51   TBILIRUBIN  0.7   --   0.4   LIPASE  3*   --    --    GLUCOSE  118*  139*  151*       Recent Labs      12/30/17   1754  12/30/17   1835  12/31/17   0939   12/31/17   1543  01/01/18   0008  01/01/18   0710   RBC  3.68*   --   3.46*   --    --    --    --    HEMOGLOBIN  10.4*   --   10.0*   --    --    --    --    HEMATOCRIT  34.8*   --   32.4*   --    --    --    --    PLATELETCT  264   --   230   --    --    --    --    PROTHROMBTM  16.3*   --    --    --    --    --    --    APTT  31.3   --    --    < >  212.0*  48.8*  78.5*   INR  1.34*   --    --    --    --    --    --    IRON   --   14*   --    --    --    --    --    FERRITIN  111.2   --    --    --    --    --    --    [facility]   --   288   --    --    --    --    --     < > = values in this interval not displayed.       Recent Labs      12/30/17   1754  12/31/17   0939  01/01/18   0007   WBC  12.8*  8.5   --    NEUTSPOLYS   79.00*  89.10*   --    LYMPHOCYTES  12.10*  8.10*   --    MONOCYTES  6.20  2.10   --    EOSINOPHILS  2.00  0.00   --    BASOPHILS  0.40  0.00   --    ASTSGOT  15   --   12   ALTSGPT  8   --   9   ALKPHOSPHAT  74   --   51   TBILIRUBIN  0.7   --   0.4           Assessment/Plan     * COPD (chronic obstructive pulmonary disease) with acute bronchitis (CMS-HCC)   Assessment & Plan    - 1-2 days of difficulty breathing and increased somnolence prior to admission. Concern for AMS on admission, now improved although pt appears to have some cognitive impairment  - Admitted in November 2017 for acute on chronic respiratory failure. PCO2 70-80 on that admission required intubation   Flu negative.  - ABG showed pCO2 69.8. Bicarb 31  - ABG 12/31/17 --> pCO2 48.6,  PO2 62.8, pH 7.38  - Elevated WBC on admission, now normal   - Unasyn d/c 12/31/17, pro-calc was negative and no evidence for infection   - 12/31/17 : Code status discussed with patient and daughter. Daughter states that patient would not want chest compressions. But intubation would be ok for a short time on admission, family still agree at this time   Pt does not want to be hospitalized for his COPD anymore, agreeable to hospice consult, family contacted   Plan:  - gabapentin and MS contin held   - continue RT protocol, ipratropium, levalbuterol (as pt is already tachycardic) while I/P  - continue Prednisone 40mg daily - D4 of 5  - discussed with [facility] --> a/w [facility] assessment  - a/w SNF            LINDSEY (acute kidney injury) (CMS-HCC)- (present on admission)   Assessment & Plan    - BUN/Cr 20/2.13 on admission. Baseline 1.2  - was 33/1.23 prior to discharge in Nov 2017  - Straight cath required for urinary retention on 12/30/17 - ? Residual  - BUN/Creat today 1/1/18 - 38/1.69  - Urine lytes normal  - xarelto switched to heparin --> eliquis  Plan:  - Lisinopril held  - not for further intervention/investigations  - not for daily labs, IV,   -  Continue supportive pulm meds for COPD        Chronic pulmonary embolism (CMS-HCC)- (present on admission)   Assessment & Plan    - Chronically anticoagulated on xeralto for PEs. Will hold for now due to LINDSEY  - was on anti-coagulation with wt based heparin - d/c'ed this am, as patient doesn't want/like being attached to the drip  - Wells score 4.5, moderate risk for PE however given improvement of hypoxia, MS and HR and patient already on anticoagulation will not complete imaging or D-dimer.   - Renal function worse today 1/1/18, and pt not keen on heparin drip, heparin d/c'ed on 1/1/18. Eliquis commenced  Plan  - continue Eliquis        Anemia   Assessment & Plan    - Hgb 10.4 on admission.  at baseline.   - Iron low 14, TIBC 288, % sat 5, ferritin normal 111.2. Possible secondary to anemia of chronic disease.   - FOBT pending        Cellulitis of both lower extremities   Assessment & Plan    - followed by wound care as outpatient for B/L LE cellulitis   - Wound team review noted --> signed off by wound care  - Started on Unasyn on admission, discontinued as exam consistent with chronic cellulitis  - afebrile, WBC now normal   - ctm  - not for daily labs        Paroxysmal atrial fibrillation (CMS-HCC)- (present on admission)   Assessment & Plan    - Sinus tachycardia on EKG this admission  - On xeralto at home. Held on admission for LINDSEY  - Anticoagulation switched from heparin to eliquis   - Echo NOv 2017 : LV EF 60%, RVSP 16 mm Hg              Alondra Poon

## 2018-01-03 NOTE — PROGRESS NOTES
Pt is AAO x4  Pt reports a 5/10 low back pain level. Pt reports this is chronic.  Medicated per MAR.  VS WNL, BP is trending back to WNL.  No IV, MD aware.  Bilateral lower leg dressings in place, CDI.  4L O2 running via NC, this is baseline.  Abdomen rounded and firm, pt reports this is baseline. Had some small Intestines removed years ago.  POC discussed.  All needs met at this time.  Bed in low position.  Call light within reach.  Rounding in place.

## 2018-01-04 LAB
BACTERIA BLD CULT: NORMAL
BACTERIA BLD CULT: NORMAL
SIGNIFICANT IND 70042: NORMAL
SIGNIFICANT IND 70042: NORMAL
SITE SITE: NORMAL
SITE SITE: NORMAL
SOURCE SOURCE: NORMAL
SOURCE SOURCE: NORMAL

## 2018-01-04 PROCEDURE — 700102 HCHG RX REV CODE 250 W/ 637 OVERRIDE(OP): Performed by: INTERNAL MEDICINE

## 2018-01-04 PROCEDURE — 700102 HCHG RX REV CODE 250 W/ 637 OVERRIDE(OP): Performed by: STUDENT IN AN ORGANIZED HEALTH CARE EDUCATION/TRAINING PROGRAM

## 2018-01-04 PROCEDURE — 770006 HCHG ROOM/CARE - MED/SURG/GYN SEMI*

## 2018-01-04 PROCEDURE — A9270 NON-COVERED ITEM OR SERVICE: HCPCS | Performed by: STUDENT IN AN ORGANIZED HEALTH CARE EDUCATION/TRAINING PROGRAM

## 2018-01-04 PROCEDURE — 700101 HCHG RX REV CODE 250: Performed by: HOSPITALIST

## 2018-01-04 PROCEDURE — A9270 NON-COVERED ITEM OR SERVICE: HCPCS | Performed by: INTERNAL MEDICINE

## 2018-01-04 PROCEDURE — 94640 AIRWAY INHALATION TREATMENT: CPT

## 2018-01-04 RX ORDER — IPRATROPIUM BROMIDE AND ALBUTEROL SULFATE 2.5; .5 MG/3ML; MG/3ML
3 SOLUTION RESPIRATORY (INHALATION)
Status: DISCONTINUED | OUTPATIENT
Start: 2018-01-04 | End: 2018-01-11 | Stop reason: HOSPADM

## 2018-01-04 RX ADMIN — IPRATROPIUM BROMIDE AND ALBUTEROL SULFATE 3 ML: .5; 3 SOLUTION RESPIRATORY (INHALATION) at 06:29

## 2018-01-04 RX ADMIN — STANDARDIZED SENNA CONCENTRATE AND DOCUSATE SODIUM 2 TABLET: 8.6; 5 TABLET, FILM COATED ORAL at 20:18

## 2018-01-04 RX ADMIN — GABAPENTIN 300 MG: 300 CAPSULE ORAL at 20:19

## 2018-01-04 RX ADMIN — APIXABAN 5 MG: 5 TABLET, FILM COATED ORAL at 20:18

## 2018-01-04 RX ADMIN — BUDESONIDE AND FORMOTEROL FUMARATE DIHYDRATE 2 PUFF: 160; 4.5 AEROSOL RESPIRATORY (INHALATION) at 07:57

## 2018-01-04 RX ADMIN — METOPROLOL TARTRATE 50 MG: 50 TABLET, FILM COATED ORAL at 20:18

## 2018-01-04 RX ADMIN — STANDARDIZED SENNA CONCENTRATE AND DOCUSATE SODIUM 2 TABLET: 8.6; 5 TABLET, FILM COATED ORAL at 07:56

## 2018-01-04 RX ADMIN — TAMSULOSIN HYDROCHLORIDE 0.4 MG: 0.4 CAPSULE ORAL at 07:56

## 2018-01-04 RX ADMIN — METOPROLOL TARTRATE 50 MG: 50 TABLET, FILM COATED ORAL at 07:56

## 2018-01-04 RX ADMIN — ACETAMINOPHEN 650 MG: 325 TABLET, FILM COATED ORAL at 13:01

## 2018-01-04 RX ADMIN — AMLODIPINE BESYLATE 10 MG: 10 TABLET ORAL at 07:56

## 2018-01-04 RX ADMIN — BUDESONIDE AND FORMOTEROL FUMARATE DIHYDRATE 2 PUFF: 160; 4.5 AEROSOL RESPIRATORY (INHALATION) at 20:21

## 2018-01-04 RX ADMIN — APIXABAN 5 MG: 5 TABLET, FILM COATED ORAL at 07:56

## 2018-01-04 ASSESSMENT — PAIN SCALES - GENERAL
PAINLEVEL_OUTOF10: 2
PAINLEVEL_OUTOF10: 2
PAINLEVEL_OUTOF10: 0
PAINLEVEL_OUTOF10: 0

## 2018-01-04 ASSESSMENT — ENCOUNTER SYMPTOMS
FALLS: 0
DOUBLE VISION: 0
DIARRHEA: 0
CHILLS: 0
DIZZINESS: 0
BACK PAIN: 1
BLURRED VISION: 0
SENSORY CHANGE: 0
COUGH: 0
NAUSEA: 0
SPUTUM PRODUCTION: 0
VOMITING: 0
PHOTOPHOBIA: 0
SHORTNESS OF BREATH: 1
SORE THROAT: 0
PALPITATIONS: 0
BLOOD IN STOOL: 0
HEADACHES: 0
MEMORY LOSS: 1
FEVER: 0
WHEEZING: 1
CONSTIPATION: 0
NERVOUS/ANXIOUS: 0
WEAKNESS: 1
ORTHOPNEA: 1
BRUISES/BLEEDS EASILY: 0
ABDOMINAL PAIN: 0
MYALGIAS: 0
POLYDIPSIA: 0
FOCAL WEAKNESS: 0

## 2018-01-04 ASSESSMENT — LIFESTYLE VARIABLES: SUBSTANCE_ABUSE: 0

## 2018-01-04 NOTE — CARE PLAN
Problem: Safety  Goal: Will remain free from injury  Call light within reach, pt calls for assistance at all times. Bed in low position and locked, upper bedside rails up, proper mobility signs placed, personal possessions within reach, treaded socks on. Hourly rounding in place.        Problem: Mobility  Goal: Risk for activity intolerance will decrease  Pt up with x1 assist using fww

## 2018-01-04 NOTE — PROGRESS NOTES
Report received from day RN, and care assumed. Pt A&O x 4. Pt denies N/V and SOB at this time. He does complain of low back pain, but states that this is chronic and declined PRN Tylenol, heat packs, and ice packs when offered. Lung sounds are diminished in the lower lobes, however he maintains an 02 saturation in the 90's. He is on 4L via NC which is his baseline. Pt's BLLE's are also edematous and cellulitis is present. Pt states that this is his norm. Vital signs reviewed and are WNL. IV assessed and patent, with dressing CDI. Plan is for Pt to discharge with [facility].  POC discussed with Pt and questions answered. POC inlcudes pain control, rest, CP02, and RT treatments. Bed is in the lowest position, rails are up, and call light is within reach. Communication board updated and hourly rounding implemented.

## 2018-01-04 NOTE — CARE PLAN
Problem: Safety  Goal: Will remain free from falls    Intervention: Implement fall precautions  Bed is in the lowest position, call light and belongings are within reach, rails are up, and hourly rounding is in place.       Problem: Respiratory:  Goal: Respiratory status will improve    Intervention: Assess and monitor pulmonary status  Pt maintains 02 in the 90's on 4L, Pt's baseline, while at rest and with ambulation.

## 2018-01-04 NOTE — DISCHARGE PLANNING
Medical Social Work    Heide from [facility] has been working with the [facility] to get pt approved for SNF.  LORENE followed up with Heide on status - Heide has not heard anything yet from [facility].      LORENE remains available for DC planning.

## 2018-01-04 NOTE — PROGRESS NOTES
Internal Medicine Interval Note  Note Author: Alondra Poon M.D.     Name MARSHAL Calzada       1940   Age/Sex 77 y.o. male   MRN [id number]   Code Status  DNR, DNI     After 5PM or if no immediate response to page, please call for cross-coverage  Attending/Team: Dr. Quach/ David See Patient List for primary contact information  Call [phone] to page    1st Call - Day Intern (R1):   Dr. Poon 2nd Call - Day Sr. Resident (R2/R3):   Dr. Ortega         Reason for interval visit  (Principal Problem)   COPD (chronic obstructive pulmonary disease) with acute bronchitis (CMS-[facility])    Interval Problem Daily Status Update  (24 hours)     77 year old male with complex medical history presents with acute on chronic hypoxic and hypercapnic respiratory failure likely secondary to COPD exacerbation, re-admission following discharge for the same in 2017 requiring intubation    - uneventful night  - off telemetry  - 92-97% on 4 lts oxygen  - [facility] reviewed (no note on EPIC) --> as per SW accepted for Hospice  - a/w SNF approval --> SW dealing with insurance company, and placing a special approval letter for [facility] as other SNFs not covered by insurance  - patient looks well, pleasant, co-operative  - no routine labs / IV cannulas / EKG / X-rays. Keep patient comfortable      Review of Systems   Constitutional: Positive for malaise/fatigue. Negative for chills and fever.   HENT: Negative for congestion, hearing loss, nosebleeds, sore throat and tinnitus.    Eyes: Negative for blurred vision, double vision and photophobia.        Chronic blurred vision, wears glasses   Respiratory: Positive for shortness of breath and wheezing. Negative for cough and sputum production.    Cardiovascular: Positive for orthopnea and leg swelling. Negative for chest pain and palpitations.   Gastrointestinal: Negative for abdominal pain, blood in stool, constipation, diarrhea, melena, nausea and vomiting.    Genitourinary: Negative for dysuria, frequency and urgency.   Musculoskeletal: Positive for back pain. Negative for falls and myalgias.   Skin: Negative for itching and rash.   Neurological: Positive for weakness. Negative for dizziness, sensory change, focal weakness and headaches.   Endo/Heme/Allergies: Negative for polydipsia. Does not bruise/bleed easily.   Psychiatric/Behavioral: Positive for memory loss. Negative for substance abuse. The patient is not nervous/anxious.        Consultants/Specialty  Hospice    Disposition  A/w SNF placement    Quality Measures    Reviewed items::  EKG reviewed, Labs reviewed, Medications reviewed and Radiology images reviewed  Muniz catheter::  No Muniz  DVT: heparin discontinued, eliquis started.          Physical Exam       Vitals:    01/03/18 1200 01/03/18 1531 01/03/18 1600 01/03/18 1604   BP: 151/77  (!) 180/73 148/77   Pulse: 84 78 96    Resp: 16 16 18    Temp: 36.4 °C (97.6 °F)  36.7 °C (98.1 °F)    SpO2: 92% 93% 92%    Weight:       Height:         Body mass index is 34.41 kg/m². Weight: 105.7 kg (233 lb 0.4 oz)  Oxygen Therapy:  Pulse Oximetry: 92 %, O2 (LPM): 4, O2 Delivery: Nasal Cannula    Physical Exam   Constitutional:   Obese male,  in no acute distress, sitting in chair with 4 lts oxygen via NC   HENT:   Head: Normocephalic and atraumatic.   Mouth/Throat: Oropharynx is clear and moist. No oropharyngeal exudate.   Eyes: Conjunctivae and EOM are normal. Pupils are equal, round, and reactive to light. No scleral icterus.   Neck: No JVD present.   Large neck circumference, no obvious JVD   Cardiovascular: Normal rate, regular rhythm, normal heart sounds and intact distal pulses.  Exam reveals no gallop and no friction rub.    No murmur heard.  Pulmonary/Chest: No stridor. No respiratory distress. He has wheezes. He has no rales. He exhibits no tenderness.   Has oxygen via NC, on 4 lts, diffuse inspiratory and expiratory wheezing   Abdominal: Soft. Bowel sounds are  normal. There is no tenderness. There is no rebound and no guarding.   Obese abdomen, well healed surgical scars   Musculoskeletal: He exhibits edema. He exhibits no tenderness or deformity.   1+ pitting edema bilaterally. LE bandaged   Lymphadenopathy:     He has no cervical adenopathy.   Neurological: He is alert. He displays normal reflexes. No cranial nerve deficit. He exhibits normal muscle tone.   Oriented x 2. Strength 4/4 RUE, R/LLE. 3/4 in LUE   Skin: Skin is warm and dry. No rash noted.   B/L LE with signs of chronic cellulitis, no signs of superinfection   Psychiatric: Affect and judgment normal.   Poor memory         Lab Data Review:         12/31/2017  12:07 PM    Recent Labs      01/01/18   0007   SODIUM  132*   POTASSIUM  4.2   CHLORIDE  95*   CO2  28   BUN  38*   CREATININE  1.69*   MAGNESIUM  2.0   CALCIUM  9.2       Recent Labs      01/01/18   0007   ALTSGPT  9   ASTSGOT  12   ALKPHOSPHAT  51   TBILIRUBIN  0.4   GLUCOSE  151*       Recent Labs      01/01/18   0008  01/01/18   0710  01/03/18   0258   RBC   --    --   3.43*   HEMOGLOBIN   --    --   9.9*   HEMATOCRIT   --    --   30.3*   PLATELETCT   --    --   241   APTT  48.8*  78.5*   --        Recent Labs      01/01/18 0007 01/03/18   0258   WBC   --   6.2   ASTSGOT  12   --    ALTSGPT  9   --    ALKPHOSPHAT  51   --    TBILIRUBIN  0.4   --            Assessment/Plan     * COPD (chronic obstructive pulmonary disease) with acute bronchitis (CMS-HCC)   Assessment & Plan    - 1-2 days of difficulty breathing and increased somnolence prior to admission. Concern for AMS on admission, now improved although pt appears to have some cognitive impairment  - Admitted in November 2017 for acute on chronic respiratory failure. PCO2 70-80 on that admission required intubation   Flu negative.  - ABG showed pCO2 69.8. Bicarb 31  - ABG 12/31/17 --> pCO2 48.6,  PO2 62.8, pH 7.38  - Elevated WBC on admission, now normal   - Unasyn d/c 12/31/17, pro-calc was  negative and no evidence for infection   - 12/31/17 : Code status discussed with patient and daughter. Daughter states that patient would not want chest compressions. But intubation would be ok for a short time on admission, family still agree at this time   Pt does not want to be hospitalized for his COPD anymore, agreeable to hospice consult, family contacted   Finished course of prednisone  Accepted by [facility]  Plan:  - gabapentin and MS contin held   - continue RT protocol, ipratropium, levalbuterol (as pt is already tachycardic) while I/P  - a/w SNF            LINDSEY (acute kidney injury) (CMS-HCC)- (present on admission)   Assessment & Plan    - BUN/Cr 20/2.13 on admission. Baseline 1.2  - was 33/1.23 prior to discharge in Nov 2017  - Straight cath required for urinary retention on 12/30/17 - ? Residual  - BUN/Creat today 1/1/18 - 38/1.69  - Urine lytes normal  - xarelto switched to heparin --> eliquis  Plan:  - Lisinopril held  - not for further intervention/investigations  - not for daily labs, IV,   - Continue supportive pulm meds for COPD        Chronic pulmonary embolism (CMS-HCC)- (present on admission)   Assessment & Plan    - Chronically anticoagulated on xeralto for PEs. Will hold for now due to LINDSEY  - was on anti-coagulation with wt based heparin - d/c'ed this am, as patient doesn't want/like being attached to the drip  - Wells score 4.5, moderate risk for PE however given improvement of hypoxia, MS and HR and patient already on anticoagulation will not complete imaging or D-dimer.   - Renal function worse today 1/1/18, and pt not keen on heparin drip, heparin d/c'ed on 1/1/18. Eliquis commenced  Plan  - continue Eliquis        Anemia   Assessment & Plan    - Hgb 10.4 on admission.  at baseline.   - Iron low 14, TIBC 288, % sat 5, ferritin normal 111.2. Possible secondary to anemia of chronic disease.           Cellulitis of both lower extremities   Assessment & Plan    - followed by wound care  as outpatient for B/L LE cellulitis   - Wound team review noted --> signed off by wound care  - Started on Unasyn on admission, discontinued as exam consistent with chronic cellulitis  - afebrile, WBC now normal   - ctm  - not for daily labs        Paroxysmal atrial fibrillation (CMS-HCC)- (present on admission)   Assessment & Plan    - Sinus tachycardia on EKG this admission  - On xeralto at home. Held on admission for LINDSEY  - Anticoagulation switched from heparin to eliquis   - Echo NOv 2017 : LV EF 60%, RVSP 16 mm Hg              Alondra Poon

## 2018-01-04 NOTE — PROGRESS NOTES
Assume care for patient at 0700. Pt AA/O X4. LS clear with dim t/o. On 4L NC. VSS. HRR. BS+ LBM 1/3 per pt. Denies N/V. Voids via brp with x1 assist using fww. CMS+ JACOB. WBAT to BLE. BLe edema noted. C/o n/t to ble. Refused scds to BLE. ble cellulitis, drsgs cdi. No piv access. Discussed hourly rounding and POC, pt agreeable. Refused bed alarm, pt educated re: fall risk and need of bed alarm, Pt educated and verbalized understanding of the education, pt call for assistance at all times. Pt reoriented to skylight and call light at bedside and within reach.

## 2018-01-05 PROBLEM — G89.29 CHRONIC LOW BACK PAIN: Status: ACTIVE | Noted: 2018-01-05

## 2018-01-05 PROBLEM — M54.50 CHRONIC LOW BACK PAIN: Status: ACTIVE | Noted: 2018-01-05

## 2018-01-05 PROCEDURE — 700101 HCHG RX REV CODE 250: Performed by: STUDENT IN AN ORGANIZED HEALTH CARE EDUCATION/TRAINING PROGRAM

## 2018-01-05 PROCEDURE — A9270 NON-COVERED ITEM OR SERVICE: HCPCS | Performed by: INTERNAL MEDICINE

## 2018-01-05 PROCEDURE — A9270 NON-COVERED ITEM OR SERVICE: HCPCS | Performed by: STUDENT IN AN ORGANIZED HEALTH CARE EDUCATION/TRAINING PROGRAM

## 2018-01-05 PROCEDURE — 770006 HCHG ROOM/CARE - MED/SURG/GYN SEMI*

## 2018-01-05 PROCEDURE — 700102 HCHG RX REV CODE 250 W/ 637 OVERRIDE(OP): Performed by: STUDENT IN AN ORGANIZED HEALTH CARE EDUCATION/TRAINING PROGRAM

## 2018-01-05 PROCEDURE — 99232 SBSQ HOSP IP/OBS MODERATE 35: CPT | Mod: GC | Performed by: INTERNAL MEDICINE

## 2018-01-05 PROCEDURE — 700102 HCHG RX REV CODE 250 W/ 637 OVERRIDE(OP): Performed by: INTERNAL MEDICINE

## 2018-01-05 RX ORDER — AMLODIPINE BESYLATE 10 MG/1
10 TABLET ORAL
Status: DISCONTINUED | OUTPATIENT
Start: 2018-01-05 | End: 2018-01-11 | Stop reason: HOSPADM

## 2018-01-05 RX ORDER — AMLODIPINE BESYLATE 10 MG/1
10 TABLET ORAL TWICE DAILY
Status: DISCONTINUED | OUTPATIENT
Start: 2018-01-05 | End: 2018-01-05

## 2018-01-05 RX ORDER — LIDOCAINE 50 MG/G
1 PATCH TOPICAL EVERY 24 HOURS
Status: DISCONTINUED | OUTPATIENT
Start: 2018-01-05 | End: 2018-01-11 | Stop reason: HOSPADM

## 2018-01-05 RX ADMIN — BUDESONIDE AND FORMOTEROL FUMARATE DIHYDRATE 2 PUFF: 160; 4.5 AEROSOL RESPIRATORY (INHALATION) at 09:31

## 2018-01-05 RX ADMIN — BUDESONIDE AND FORMOTEROL FUMARATE DIHYDRATE 2 PUFF: 160; 4.5 AEROSOL RESPIRATORY (INHALATION) at 19:51

## 2018-01-05 RX ADMIN — METOPROLOL TARTRATE 75 MG: 25 TABLET, FILM COATED ORAL at 09:31

## 2018-01-05 RX ADMIN — Medication 1 PACKET: at 17:13

## 2018-01-05 RX ADMIN — GABAPENTIN 300 MG: 300 CAPSULE ORAL at 19:51

## 2018-01-05 RX ADMIN — LIDOCAINE 1 PATCH: 50 PATCH TOPICAL at 17:12

## 2018-01-05 RX ADMIN — AMLODIPINE BESYLATE 10 MG: 10 TABLET ORAL at 09:30

## 2018-01-05 RX ADMIN — ACETAMINOPHEN 650 MG: 325 TABLET, FILM COATED ORAL at 19:52

## 2018-01-05 RX ADMIN — METOPROLOL TARTRATE 75 MG: 25 TABLET, FILM COATED ORAL at 19:52

## 2018-01-05 RX ADMIN — STANDARDIZED SENNA CONCENTRATE AND DOCUSATE SODIUM 2 TABLET: 8.6; 5 TABLET, FILM COATED ORAL at 09:30

## 2018-01-05 RX ADMIN — TAMSULOSIN HYDROCHLORIDE 0.4 MG: 0.4 CAPSULE ORAL at 09:30

## 2018-01-05 RX ADMIN — APIXABAN 5 MG: 5 TABLET, FILM COATED ORAL at 09:30

## 2018-01-05 RX ADMIN — ACETAMINOPHEN 650 MG: 325 TABLET, FILM COATED ORAL at 09:30

## 2018-01-05 RX ADMIN — APIXABAN 5 MG: 5 TABLET, FILM COATED ORAL at 19:51

## 2018-01-05 ASSESSMENT — PAIN SCALES - GENERAL
PAINLEVEL_OUTOF10: 7
PAINLEVEL_OUTOF10: 0
PAINLEVEL_OUTOF10: 8

## 2018-01-05 ASSESSMENT — ENCOUNTER SYMPTOMS
DIARRHEA: 0
WHEEZING: 1
PALPITATIONS: 0
FOCAL WEAKNESS: 0
BACK PAIN: 1
FEVER: 0
SORE THROAT: 0
WEAKNESS: 1
ORTHOPNEA: 1
NERVOUS/ANXIOUS: 0
BLURRED VISION: 0
NAUSEA: 0
CHILLS: 0
BLOOD IN STOOL: 0
SPUTUM PRODUCTION: 0
ABDOMINAL PAIN: 0
SHORTNESS OF BREATH: 1
POLYDIPSIA: 0
MYALGIAS: 0
FALLS: 0
HEADACHES: 0
BRUISES/BLEEDS EASILY: 0
PHOTOPHOBIA: 0
VOMITING: 0
COUGH: 0
CONSTIPATION: 0
SENSORY CHANGE: 0
MEMORY LOSS: 1
DIZZINESS: 0
DOUBLE VISION: 0

## 2018-01-05 ASSESSMENT — LIFESTYLE VARIABLES: SUBSTANCE_ABUSE: 0

## 2018-01-05 NOTE — PROGRESS NOTES
Received shift report from anupama RN and assumed care of this pt at 0715. Pt AOx4, calm, sitting in chair. Pt reports pain is 7/10 chronically to back, discussed w MD. Pt is up SBA +FWW. Does call appropriately. Bed alarm is off per pt refusal. No PIV, OK per MD. Dressings in place to BLE, CDI. Pt is on 4L nc with SaO2 >90%. Pt states priority this shift is DC planning. Discussed POC for SW f/u pending acceptance to SNF, day time routine, comfort, and safety. Patient has call light and personal belongings within reach. Safety and fall precautions in place. Reviewed orders, notes, labs, and test results. Hourly rounding in place with RN rounding on odd hours and CNA on even hours.

## 2018-01-05 NOTE — PROGRESS NOTES
Internal Medicine Interval Note  Note Author: Alondra Poon M.D.     Name MARSHAL Calzada       1940   Age/Sex 77 y.o. male   MRN [id number]   Code Status  DNR, DNI     After 5PM or if no immediate response to page, please call for cross-coverage  Attending/Team: Dr. Quach/ David See Patient List for primary contact information  Call [phone] to page    1st Call - Day Intern (R1):   Dr. Poon 2nd Call - Day Sr. Resident (R2/R3):   Dr. Ortega         Reason for interval visit  (Principal Problem)   COPD (chronic obstructive pulmonary disease) with acute bronchitis (CMS-[facility])    Interval Problem Daily Status Update  (24 hours)     77 year old male with complex medical history presents with acute on chronic hypoxic and hypercapnic respiratory failure likely secondary to COPD exacerbation, re-admission following discharge for the same in 2017 requiring intubation    - uneventful night  - off telemetry  - 92-97% on 4 lts oxygen   - [facility] reviewed (no note on EPIC) --> as per SW accepted for Hospice  - Insurance paying Hospice. A/w [facility] coverage for SNF/bed  - patient looks well, pleasant, co-operative  - BP has been high past 2-3 days.  - was commenced on Amlodipine 5 mg --> increased to 10 mg  - BP last night, still high 160-180 systolic  - vet c/o intermittent headaches  - on Eliquis  - metoprolol increased to 75 mg BID  - no routine labs / IV cannulas / EKG / X-rays. Keep patient comfortable         Review of Systems   Constitutional: Positive for malaise/fatigue. Negative for chills and fever.        Very pleasant and cooperative   HENT: Negative for congestion, hearing loss, nosebleeds, sore throat and tinnitus.    Eyes: Negative for blurred vision, double vision and photophobia.        Chronic blurred vision, wears glasses   Respiratory: Positive for shortness of breath and wheezing. Negative for cough and sputum production.    Cardiovascular: Positive for orthopnea and leg swelling.  Negative for chest pain and palpitations.   Gastrointestinal: Negative for abdominal pain, blood in stool, constipation, diarrhea, melena, nausea and vomiting.   Genitourinary: Negative for dysuria, frequency and urgency.   Musculoskeletal: Positive for back pain. Negative for falls and myalgias.   Skin: Negative for itching and rash.   Neurological: Positive for weakness. Negative for dizziness, sensory change, focal weakness and headaches.   Endo/Heme/Allergies: Negative for polydipsia. Does not bruise/bleed easily.   Psychiatric/Behavioral: Positive for memory loss. Negative for substance abuse. The patient is not nervous/anxious.        Consultants/Specialty  Hospice    Disposition  A/w SNF placement    Quality Measures    Reviewed items::  EKG reviewed, Labs reviewed, Medications reviewed and Radiology images reviewed  Muniz catheter::  No Muniz  DVT: heparin discontinued, eliquis started.          Physical Exam       Vitals:    01/04/18 2017 01/05/18 0406 01/05/18 0800 01/05/18 1123   BP: (!) 165/74 (!) 169/89 (!) 165/73 (!) 167/78   Pulse: 81 71 77 65   Resp: 18 17 16 16   Temp: 36.7 °C (98 °F) 36.6 °C (97.8 °F) 36.2 °C (97.1 °F)    SpO2: 94% 97% 94%    Weight:       Height:         Body mass index is 34.61 kg/m².    Oxygen Therapy:  Pulse Oximetry: 94 %, O2 (LPM): 4, O2 Delivery: Nasal Cannula    Physical Exam   Constitutional:   Obese male,  in no acute distress, sitting in chair with 4 lts oxygen via NC   HENT:   Head: Normocephalic and atraumatic.   Mouth/Throat: Oropharynx is clear and moist. No oropharyngeal exudate.   Eyes: Conjunctivae and EOM are normal. Pupils are equal, round, and reactive to light. No scleral icterus.   Neck: No JVD present.   Large neck circumference, no obvious JVD   Cardiovascular: Normal rate, regular rhythm, normal heart sounds and intact distal pulses.  Exam reveals no gallop and no friction rub.    No murmur heard.  Pulmonary/Chest: No stridor. No respiratory distress. He  has wheezes. He has no rales. He exhibits no tenderness.   Wheezing improved   Abdominal: Soft. Bowel sounds are normal. There is no tenderness. There is no rebound and no guarding.   Obese abdomen, well healed surgical scars   Musculoskeletal: He exhibits edema. He exhibits no tenderness or deformity.   1+ pitting edema bilaterally. LE bandaged   Lymphadenopathy:     He has no cervical adenopathy.   Neurological: He is alert. He displays normal reflexes. No cranial nerve deficit. He exhibits normal muscle tone.   Oriented x 2. Strength 4/4 RUE, R/LLE. 3/4 in LUE   Skin: Skin is warm and dry. No rash noted.   B/L LE with signs of chronic cellulitis, no signs of superinfection   Psychiatric: Affect and judgment normal.   Poor memory         Lab Data Review:         12/31/2017  12:07 PM    No results for input(s): SODIUM, POTASSIUM, CHLORIDE, CO2, BUN, CREATININE, MAGNESIUM, PHOSPHORUS, CALCIUM in the last 72 hours.    No results for input(s): ALTSGPT, ASTSGOT, ALKPHOSPHAT, TBILIRUBIN, DBILIRUBIN, GAMMAGT, AMYLASE, LIPASE, ALB, PREALBUMIN, GLUCOSE in the last 72 hours.    Recent Labs      01/03/18   0258   RBC  3.43*   HEMOGLOBIN  9.9*   HEMATOCRIT  30.3*   PLATELETCT  241       Recent Labs      01/03/18   0258   WBC  6.2           Assessment/Plan     * COPD (chronic obstructive pulmonary disease) with acute bronchitis (CMS-HCC)   Assessment & Plan    - 1-2 days of difficulty breathing and increased somnolence prior to admission. Concern for AMS on admission, now improved although pt appears to have some cognitive impairment  - Admitted in November 2017 for acute on chronic respiratory failure. PCO2 70-80 on that admission required intubation   Flu negative.  - ABG showed pCO2 69.8. Bicarb 31  - ABG 12/31/17 --> pCO2 48.6,  PO2 62.8, pH 7.38  - Elevated WBC on admission, now normal   - Unasyn d/c 12/31/17, pro-calc was negative and no evidence for infection   - 12/31/17 : Code status discussed with patient and  daughter. Daughter states that patient would not want chest compressions. But intubation would be ok for a short time on admission, family still agree at this time   Pt does not want to be hospitalized for his COPD anymore, agreeable to hospice consult, family contacted   Finished course of prednisone  Accepted by [facility]  Plan:  - gabapentin recommenced  - being heldMS contin held   - continue RT protocol, ipratropium, levalbuterol (as pt is already tachycardic) while I/P  - a/w [facility] --> a/w [facility] payment approval for [facility]            LINDSEY (acute kidney injury) (CMS-HCC)- (present on admission)   Assessment & Plan    - BUN/Cr 20/2.13 on admission. Baseline 1.2  - was 33/1.23 prior to discharge in Nov 2017  - Straight cath required for urinary retention on 12/30/17 - ? Residual  - BUN/Creat today 1/1/18 - 38/1.69  - Urine lytes normal  - xarelto switched to heparin --> eliquis  Plan:  - not for further intervention/investigations  - not for daily labs, IV, --> patient much more cooperative since stopping daily labs, IVs  - Continue supportive pulm meds for COPD        Chronic pulmonary embolism (CMS-HCC)- (present on admission)   Assessment & Plan    - Chronically anticoagulated on xeralto for PEs. Will hold for now due to LINDSEY  - was on anti-coagulation with wt based heparin - d/c'ed this am, as patient doesn't want/like being attached to the drip  - Wells score 4.5, moderate risk for PE however given improvement of hypoxia, MS and HR and patient already on anticoagulation will not complete imaging or D-dimer.   - Renal function worse 1/1/18, and pt not keen on heparin drip, heparin d/c'ed on 1/1/18. Eliquis commenced 1/1/18  - not checking labs, patient now Hospice and much better after stopping invasive tests   Plan  - continue Eliquis        Chronic low back pain   Assessment & Plan    - chronic   - uses gabapentin, MS Contin 15 mg TID at home  - been having tylenol, lidocaine and gabapentin  - MS  Contin continues to be held for now        Anemia   Assessment & Plan    - Hgb 10.4 on admission.  at baseline.   - Iron low 14, TIBC 288, % sat 5, ferritin normal 111.2. Possible secondary to anemia of chronic disease.           Cellulitis of both lower extremities   Assessment & Plan    - followed by wound care as outpatient for B/L LE cellulitis   - Wound team review noted --> signed off by wound care  - Started on Unasyn on admission, discontinued as exam consistent with chronic cellulitis  - afebrile, WBC now normal   - ctm  - not for daily labs        Paroxysmal atrial fibrillation (CMS-HCC)- (present on admission)   Assessment & Plan    - Sinus tachycardia on EKG this admission  - On xeralto at home. Held on admission for LINDSEY  - Anticoagulation switched from heparin to eliquis   - Echo NOv 2017 : LV EF 60%, RVSP 16 mm Hg              Alondra Poon

## 2018-01-05 NOTE — PROGRESS NOTES
Internal Medicine Interval Note  Note Author: Frieda Ortega M.D.     Name MARSHAL Calzada       1940   Age/Sex 77 y.o. male   MRN [id number]   Code Status  DNR, DNI     After 5PM or if no immediate response to page, please call for cross-coverage  Attending/Team: Dr. Quach/ David See Patient List for primary contact information  Call [phone] to page    1st Call - Day Intern (R1):   Dr. Poon 2nd Call - Day Sr. Resident (R2/R3):   Dr. Ortega         Reason for interval visit  (Principal Problem)   COPD (chronic obstructive pulmonary disease) with acute bronchitis (CMS-[facility])    Interval Problem Daily Status Update  (24 hours)     77 year old male with complex medical history presents with acute on chronic hypoxic and hypercapnic respiratory failure likely secondary to COPD exacerbation, re-admission following discharge for the same in 2017 requiring intubation    - uneventful night  - off telemetry  - 92-97% on 4 lts oxygen  - [facility] reviewed (no note on EPIC) --> as per SW accepted for Hospice  - a/w SNF approval --> SW dealing with insurance company, and placing a special approval letter for [facility] as other SNFs not covered by insurance  - patient looks well, pleasant, co-operative  - no routine labs / IV cannulas / EKG / X-rays. Keep patient comfortable. Limit physical exams especially in early mornings       Review of Systems   Constitutional: Positive for malaise/fatigue. Negative for chills and fever.   HENT: Negative for congestion, hearing loss, nosebleeds, sore throat and tinnitus.    Eyes: Negative for blurred vision, double vision and photophobia.        Chronic blurred vision, wears glasses   Respiratory: Positive for shortness of breath and wheezing. Negative for cough and sputum production.    Cardiovascular: Positive for orthopnea and leg swelling. Negative for chest pain and palpitations.   Gastrointestinal: Negative for abdominal pain, blood in stool, constipation,  diarrhea, melena, nausea and vomiting.   Genitourinary: Negative for dysuria, frequency and urgency.   Musculoskeletal: Positive for back pain. Negative for falls and myalgias.   Skin: Negative for itching and rash.   Neurological: Positive for weakness. Negative for dizziness, sensory change, focal weakness and headaches.   Endo/Heme/Allergies: Negative for polydipsia. Does not bruise/bleed easily.   Psychiatric/Behavioral: Positive for memory loss. Negative for substance abuse. The patient is not nervous/anxious.        Consultants/Specialty  Hospice    Disposition  A/w SNF placement    Quality Measures    Reviewed items::  EKG reviewed, Labs reviewed, Medications reviewed and Radiology images reviewed  Muniz catheter::  No Muniz  DVT: heparin discontinued, eliquis started.          Physical Exam       Vitals:    01/04/18 0800 01/04/18 1200 01/04/18 1600 01/04/18 2017   BP: (!) 190/88 (!) 161/77 158/80 (!) 165/74   Pulse: 91 74 79 81   Resp: 19 19 16 18   Temp: 36.6 °C (97.8 °F) 35.9 °C (96.6 °F) 36.5 °C (97.7 °F) 36.7 °C (98 °F)   SpO2: 91% 93% 96% 94%   Weight: 106.3 kg (234 lb 5.6 oz)      Height:         Body mass index is 34.61 kg/m². Weight: 106.3 kg (234 lb 5.6 oz)  Oxygen Therapy:  Pulse Oximetry: 94 %, O2 (LPM): 4, O2 Delivery: Nasal Cannula    Physical Exam   Constitutional: No distress.   Obese male,  in no acute distress, ambulating near bathroom with NC   In NAD, conversant and pleasant   HENT:   Head: Normocephalic and atraumatic.   Mouth/Throat: Oropharynx is clear and moist.   Eyes: Conjunctivae and EOM are normal.   Neck: No JVD present.   Large neck circumference, no obvious JVD   Cardiovascular:   Physical exam limited due to patient discomfort. He is hospice and requesting minimal interventions/assessments.    Pulmonary/Chest: No stridor.   Has oxygen via NC, no tachypnea or resp distress    Abdominal: Soft. Bowel sounds are normal.   Obese abdomen, well healed surgical scars   Musculoskeletal:  He exhibits edema. He exhibits no tenderness or deformity.   1+ pitting edema bilaterally. LE bandaged   Lymphadenopathy:     He has no cervical adenopathy.   Neurological: He is alert. He displays normal reflexes. No cranial nerve deficit. He exhibits normal muscle tone.   Oriented x 2. Strength 4/4 RUE, R/LLE. 3/4 in LUE   Skin: Skin is warm and dry. No rash noted. He is not diaphoretic.   B/L LE with signs of chronic cellulitis, no signs of superinfection   Psychiatric: Affect and judgment normal.   Poor memory         Lab Data Review:         12/31/2017  12:07 PM    No results for input(s): SODIUM, POTASSIUM, CHLORIDE, CO2, BUN, CREATININE, MAGNESIUM, PHOSPHORUS, CALCIUM in the last 72 hours.    No results for input(s): ALTSGPT, ASTSGOT, ALKPHOSPHAT, TBILIRUBIN, DBILIRUBIN, GAMMAGT, AMYLASE, LIPASE, ALB, PREALBUMIN, GLUCOSE in the last 72 hours.    Recent Labs      01/03/18   0258   RBC  3.43*   HEMOGLOBIN  9.9*   HEMATOCRIT  30.3*   PLATELETCT  241       Recent Labs      01/03/18   0258   WBC  6.2           Assessment/Plan     * COPD (chronic obstructive pulmonary disease) with acute bronchitis (CMS-HCC)   Assessment & Plan    - 1-2 days of difficulty breathing and increased somnolence prior to admission. Concern for AMS on admission, now improved although pt appears to have some cognitive impairment  - Admitted in November 2017 for acute on chronic respiratory failure. PCO2 70-80 on that admission required intubation   Flu negative.  - ABG showed pCO2 69.8. Bicarb 31  - ABG 12/31/17 --> pCO2 48.6,  PO2 62.8, pH 7.38  - Elevated WBC on admission, now normal   - Unasyn d/c 12/31/17, pro-calc was negative and no evidence for infection   - 12/31/17 : Code status discussed with patient and daughter. Daughter states that patient would not want chest compressions. But intubation would be ok for a short time on admission, family still agree at this time   Pt does not want to be hospitalized for his COPD anymore,  agreeable to hospice consult, family contacted   Finished course of prednisone  Accepted by [facility]  Plan:  - gabapentin and MS contin held   - continue RT protocol, ipratropium, levalbuterol (as pt is already tachycardic) while I/P  - a/w SNF            LINDSEY (acute kidney injury) (CMS-HCC)- (present on admission)   Assessment & Plan    - BUN/Cr 20/2.13 on admission. Baseline 1.2  - was 33/1.23 prior to discharge in Nov 2017  - Straight cath required for urinary retention on 12/30/17 - ? Residual  - BUN/Creat today 1/1/18 - 38/1.69  - Urine lytes normal  - xarelto switched to heparin --> eliquis  Plan:  - Lisinopril held  - not for further intervention/investigations  - not for daily labs, IV,   - Continue supportive pulm meds for COPD        Chronic pulmonary embolism (CMS-HCC)- (present on admission)   Assessment & Plan    - Chronically anticoagulated on xeralto for PEs. Will hold for now due to LINDSEY  - was on anti-coagulation with wt based heparin - d/c'ed this am, as patient doesn't want/like being attached to the drip  - Wells score 4.5, moderate risk for PE however given improvement of hypoxia, MS and HR and patient already on anticoagulation will not complete imaging or D-dimer.   - Renal function worse today 1/1/18, and pt not keen on heparin drip, heparin d/c'ed on 1/1/18. Eliquis commenced  Plan  - continue Eliquis        Anemia   Assessment & Plan    - Hgb 10.4 on admission.  at baseline.   - Iron low 14, TIBC 288, % sat 5, ferritin normal 111.2. Possible secondary to anemia of chronic disease.           Cellulitis of both lower extremities   Assessment & Plan    - followed by wound care as outpatient for B/L LE cellulitis   - Wound team review noted --> signed off by wound care  - Started on Unasyn on admission, discontinued as exam consistent with chronic cellulitis  - afebrile, WBC now normal   - ctm  - not for daily labs        Paroxysmal atrial fibrillation (CMS-HCC)- (present on admission)    Assessment & Plan    - Sinus tachycardia on EKG this admission  - On xeralto at home. Held on admission for LINDSEY  - Anticoagulation switched from heparin to eliquis   - Echo NOv 2017 : LV EF 60%, RVSP 16 mm Hg              Alondra Poon

## 2018-01-05 NOTE — DISCHARGE PLANNING
Medical Social Work    SW left message with Robin at [facility] that pt's [facility] will pay for bed.  LORENE also talked to [facility] about [facility] paying for bed - [facility] does not have any long term beds at this time but will keep file open in case one opens.

## 2018-01-05 NOTE — DISCHARGE PLANNING
Medical Social Work    SW spoke to admissions at [facility] and they will reconsider pt.  [facility] [place] notified.

## 2018-01-05 NOTE — ASSESSMENT & PLAN NOTE
- chronic   - uses gabapentin, MS Contin 15 mg TID at home  - pain refractory to tylenol, lidocaine and gabapentin  - pain improved after re-commencing home morphine  - ctm

## 2018-01-06 PROBLEM — I10 HYPERTENSION: Status: ACTIVE | Noted: 2018-01-06

## 2018-01-06 PROCEDURE — 770006 HCHG ROOM/CARE - MED/SURG/GYN SEMI*

## 2018-01-06 PROCEDURE — 700102 HCHG RX REV CODE 250 W/ 637 OVERRIDE(OP): Performed by: STUDENT IN AN ORGANIZED HEALTH CARE EDUCATION/TRAINING PROGRAM

## 2018-01-06 PROCEDURE — A9270 NON-COVERED ITEM OR SERVICE: HCPCS | Performed by: INTERNAL MEDICINE

## 2018-01-06 PROCEDURE — 99232 SBSQ HOSP IP/OBS MODERATE 35: CPT | Mod: GC | Performed by: INTERNAL MEDICINE

## 2018-01-06 PROCEDURE — A9270 NON-COVERED ITEM OR SERVICE: HCPCS | Performed by: STUDENT IN AN ORGANIZED HEALTH CARE EDUCATION/TRAINING PROGRAM

## 2018-01-06 PROCEDURE — 700102 HCHG RX REV CODE 250 W/ 637 OVERRIDE(OP): Performed by: INTERNAL MEDICINE

## 2018-01-06 RX ORDER — MORPHINE SULFATE 15 MG/1
7.5 TABLET ORAL EVERY 8 HOURS PRN
Status: DISCONTINUED | OUTPATIENT
Start: 2018-01-06 | End: 2018-01-11 | Stop reason: HOSPADM

## 2018-01-06 RX ORDER — METOPROLOL TARTRATE 100 MG/1
100 TABLET ORAL 2 TIMES DAILY
Status: DISCONTINUED | OUTPATIENT
Start: 2018-01-06 | End: 2018-01-07

## 2018-01-06 RX ADMIN — APIXABAN 5 MG: 5 TABLET, FILM COATED ORAL at 07:44

## 2018-01-06 RX ADMIN — MORPHINE SULFATE 7.5 MG: 15 TABLET ORAL at 16:00

## 2018-01-06 RX ADMIN — TAMSULOSIN HYDROCHLORIDE 0.4 MG: 0.4 CAPSULE ORAL at 07:44

## 2018-01-06 RX ADMIN — Medication 1 PACKET: at 09:00

## 2018-01-06 RX ADMIN — AMLODIPINE BESYLATE 10 MG: 10 TABLET ORAL at 07:44

## 2018-01-06 RX ADMIN — MORPHINE SULFATE 7.5 MG: 15 TABLET ORAL at 07:45

## 2018-01-06 RX ADMIN — METOPROLOL TARTRATE 100 MG: 100 TABLET, FILM COATED ORAL at 07:44

## 2018-01-06 RX ADMIN — METOPROLOL TARTRATE 100 MG: 100 TABLET, FILM COATED ORAL at 20:43

## 2018-01-06 RX ADMIN — APIXABAN 5 MG: 5 TABLET, FILM COATED ORAL at 20:44

## 2018-01-06 RX ADMIN — GABAPENTIN 300 MG: 300 CAPSULE ORAL at 20:43

## 2018-01-06 RX ADMIN — BUDESONIDE AND FORMOTEROL FUMARATE DIHYDRATE 2 PUFF: 160; 4.5 AEROSOL RESPIRATORY (INHALATION) at 20:43

## 2018-01-06 RX ADMIN — BUDESONIDE AND FORMOTEROL FUMARATE DIHYDRATE 2 PUFF: 160; 4.5 AEROSOL RESPIRATORY (INHALATION) at 07:46

## 2018-01-06 ASSESSMENT — PAIN SCALES - GENERAL
PAINLEVEL_OUTOF10: 4
PAINLEVEL_OUTOF10: 7
PAINLEVEL_OUTOF10: 4
PAINLEVEL_OUTOF10: 7
PAINLEVEL_OUTOF10: 4
PAINLEVEL_OUTOF10: 6

## 2018-01-06 ASSESSMENT — LIFESTYLE VARIABLES: SUBSTANCE_ABUSE: 0

## 2018-01-06 ASSESSMENT — ENCOUNTER SYMPTOMS
CONSTIPATION: 0
ABDOMINAL PAIN: 0
WEAKNESS: 1
SORE THROAT: 0
ORTHOPNEA: 1
POLYDIPSIA: 0
BLOOD IN STOOL: 0
HEADACHES: 0
CHILLS: 0
VOMITING: 0
PALPITATIONS: 0
BRUISES/BLEEDS EASILY: 0
PHOTOPHOBIA: 0
SHORTNESS OF BREATH: 1
DIZZINESS: 0
SPUTUM PRODUCTION: 0
MYALGIAS: 0
FOCAL WEAKNESS: 0
FEVER: 0
BLURRED VISION: 0
NERVOUS/ANXIOUS: 0
MEMORY LOSS: 0
DOUBLE VISION: 0
WHEEZING: 0
SENSORY CHANGE: 0
FALLS: 0
DIARRHEA: 0
NAUSEA: 0
BACK PAIN: 1
COUGH: 0

## 2018-01-06 NOTE — ASSESSMENT & PLAN NOTE
- CKD stage 3-4  - BP better controlled on Coreg 12.5 mg BID and amlodipine 10 mg daily  Plan  - ctm

## 2018-01-06 NOTE — PROGRESS NOTES
Assumed care of pt @0700. Bedside report received. Pt AOX 4. Pt complains about pain( back, head). PO Morphine 7.5 mg given. Pt on 4 L 02 saturating above 90%. Edema to bilateral lower extremities. Pt up with 1 person assist and FWW. Fall precaution in place. POC discussed with pt, all questions answered at this time. Pt makes needs known, call light within reach, hourly rounding in place.

## 2018-01-06 NOTE — CARE PLAN
Problem: Infection  Goal: Will remain free from infection  Outcome: PROGRESSING AS EXPECTED  No s/s of infection. Hand washing is used prior to and after entering the pts room.Pt has no IV.    Problem: Venous Thromboembolism (VTW)/Deep Vein Thrombosis (DVT) Prevention:  Goal: Patient will participate in Venous Thrombosis (VTE)/Deep Vein Thrombosis (DVT)Prevention Measures  Outcome: PROGRESSING AS EXPECTED  No s/s of DVT. Pt is on apixaban. Pt walks occasionally. Pt refuses SCDs with education provided.

## 2018-01-06 NOTE — PROGRESS NOTES
Received shift report from Bobbi STYLES and assumed care of this pt at 1900. Pt A&O x4, sitting in his chair.  Pt reports pain is 8/10. Pt is relaxing. Pt does call appropriately. Pt is SBA and steady.  Pt is on 4 LPM with SaO2 98%. POC discussed as well as unit routine, comfort, and safety. Patient has call light and personal belongings within reach. Safety and fall precautions in place. Reviewed orders, notes, labs, and test results. Hourly rounding in place with RN rounding on odd hours and CNA on even hours.

## 2018-01-06 NOTE — CARE PLAN
Problem: Safety  Goal: Will remain free from injury  Outcome: PROGRESSING AS EXPECTED  Bed alarm refused, A&O x4, slipper socks, bed locked and in low position, call light and personal belongings with reach, report given to CNA, appropriate signs outside door, hourly rounding in place.     Problem: Respiratory:  Goal: Respiratory status will improve  Outcome: PROGRESSING AS EXPECTED  Pt on 4 L02 saturating above 90%.     Problem: Mobility  Goal: Risk for activity intolerance will decrease  Outcome: PROGRESSING AS EXPECTED  Pt ambulated to the bathroom and in the hopper with 1 person assist and FWW.    Problem: Pain Management  Goal: Pain level will decrease to patient's comfort goal  Outcome: PROGRESSING AS EXPECTED  Pt complains about pain. Morphine 7.5 mg given.

## 2018-01-06 NOTE — PROGRESS NOTES
Internal Medicine Interval Note  Note Author: Alondra Poon M.D.     Name MARSHAL Calzada       1940   Age/Sex 77 y.o. male   MRN [id number]   Code Status  DNR, DNI     After 5PM or if no immediate response to page, please call for cross-coverage  Attending/Team: Dr. Quach/ David See Patient List for primary contact information  Call [phone] to page    1st Call - Day Intern (R1):   Dr. Poon 2nd Call - Day Sr. Resident (R2/R3):   Dr. Ortega         Reason for interval visit  (Principal Problem)   COPD (chronic obstructive pulmonary disease) with acute bronchitis (CMS-[facility])    Interval Problem Daily Status Update  (24 hours)     77 year old male with complex medical history presents with acute on chronic hypoxic and hypercapnic respiratory failure likely secondary to COPD exacerbation, re-admission following discharge for the same in 2017 requiring intubation    - uneventful night  - BP last night, still high 160-170 systolic, better than before (was 180-190)  - vet c/o intermittent headaches  - on Eliquis  - home metoprolol increased to 100 mg BID (from 50 BID)    - ongoing chronic low back pain, refractory to tylenol, lidocaine, gabapentin  - restart home morphine sulphate but at half dose - was receiving 15 mg TID, will give 7.5 mg TID.    - no routine labs / IV cannulas / EKG / X-rays. Keep patient comfortable   - a/w'ing placement      Review of Systems   Constitutional: Positive for malaise/fatigue. Negative for chills and fever.        Very pleasant and cooperative   HENT: Negative for congestion, hearing loss, nosebleeds, sore throat and tinnitus.    Eyes: Negative for blurred vision, double vision and photophobia.        Chronic blurred vision, wears glasses   Respiratory: Positive for shortness of breath. Negative for cough, sputum production and wheezing.    Cardiovascular: Positive for orthopnea and leg swelling. Negative for chest pain and palpitations.   Gastrointestinal: Negative  for abdominal pain, blood in stool, constipation, diarrhea, melena, nausea and vomiting.   Genitourinary: Negative for dysuria, frequency and urgency.   Musculoskeletal: Positive for back pain. Negative for falls and myalgias.   Skin: Negative for itching and rash.   Neurological: Positive for weakness. Negative for dizziness, sensory change, focal weakness and headaches.   Endo/Heme/Allergies: Negative for polydipsia. Does not bruise/bleed easily.   Psychiatric/Behavioral: Negative for memory loss and substance abuse. The patient is not nervous/anxious.        Consultants/Specialty  Hospice    Disposition  A/w SNF placement - hospice, SW and SNF coordinating    Quality Measures    Reviewed items::  EKG reviewed, Labs reviewed, Medications reviewed and Radiology images reviewed  Muniz catheter::  No Muniz  DVT: heparin discontinued, eliquis started.          Physical Exam       Vitals:    01/05/18 1945 01/05/18 2300 01/06/18 0350 01/06/18 0758   BP: (!) 170/79 (!) 163/78 (!) 177/83 156/80   Pulse: 75 66 69 76   Resp: 16 16 16 18   Temp: 36.1 °C (96.9 °F) 36.4 °C (97.5 °F) 36.2 °C (97.1 °F) 36.2 °C (97.2 °F)   SpO2: 98% 93% 97% 94%   Weight:   106.7 kg (235 lb 3.7 oz)    Height:         Body mass index is 34.74 kg/m². Weight: 106.7 kg (235 lb 3.7 oz)  Oxygen Therapy:  Pulse Oximetry: 94 %, O2 (LPM): 4, O2 Delivery: Nasal Cannula    Physical Exam   Constitutional:   Obese male,  in no acute distress, sitting in chair with 4 lts oxygen via NC   HENT:   Head: Normocephalic and atraumatic.   Mouth/Throat: Oropharynx is clear and moist. No oropharyngeal exudate.   Eyes: Conjunctivae and EOM are normal. Pupils are equal, round, and reactive to light. No scleral icterus.   Neck: No JVD present.   Large neck circumference, no obvious JVD   Cardiovascular: Normal rate, regular rhythm, normal heart sounds and intact distal pulses.  Exam reveals no gallop and no friction rub.    No murmur heard.  Pulmonary/Chest: No stridor. No  respiratory distress. He has no wheezes. He has no rales. He exhibits no tenderness.   Abdominal: Soft. Bowel sounds are normal. There is no tenderness. There is no rebound and no guarding.   Obese abdomen, well healed surgical scars   Musculoskeletal: He exhibits no edema, tenderness or deformity.   LE bandaged  Back scars ++   Lymphadenopathy:     He has no cervical adenopathy.   Neurological: He is alert. He displays normal reflexes. No cranial nerve deficit. He exhibits normal muscle tone.   Oriented x 2. Strength 4/4 RUE, R/LLE. 3/4 in LUE   Skin: Skin is warm and dry. No rash noted.   B/L LE with signs of chronic cellulitis, no signs of superinfection   Psychiatric: Affect and judgment normal.         Lab Data Review:         12/31/2017  12:07 PM    No results for input(s): SODIUM, POTASSIUM, CHLORIDE, CO2, BUN, CREATININE, MAGNESIUM, PHOSPHORUS, CALCIUM in the last 72 hours.    No results for input(s): ALTSGPT, ASTSGOT, ALKPHOSPHAT, TBILIRUBIN, DBILIRUBIN, GAMMAGT, AMYLASE, LIPASE, ALB, PREALBUMIN, GLUCOSE in the last 72 hours.    No results for input(s): RBC, HEMOGLOBIN, HEMATOCRIT, PLATELETCT, PROTHROMBTM, APTT, INR, IRON, FERRITIN, TOTIRONBC in the last 72 hours.              Assessment/Plan     * COPD (chronic obstructive pulmonary disease) with acute bronchitis (CMS-HCC)   Assessment & Plan    - 1-2 days of difficulty breathing and increased somnolence prior to admission. Concern for AMS on admission, now improved although pt appears to have some cognitive impairment  - Admitted in November 2017 for acute on chronic respiratory failure. PCO2 70-80 on that admission required intubation   Flu negative.  - ABG showed pCO2 69.8. Bicarb 31  - ABG 12/31/17 --> pCO2 48.6,  PO2 62.8, pH 7.38  - Elevated WBC on admission, now normal   - Unasyn d/c 12/31/17, pro-calc was negative and no evidence for infection   - 12/31/17 : Code status discussed with patient and daughter. Daughter states that patient would not  want chest compressions. But intubation would be ok for a short time on admission, family still agree at this time   Pt does not want to be hospitalized for his COPD anymore, agreeable to hospice consult, family contacted   Finished course of prednisone  Accepted by [facility]  Plan:  - continue RT protocol, ipratropium, levalbuterol (as pt is already tachycardic) while I/P  - a/w SNF --> [facility]            LINDSEY (acute kidney injury) (CMS-HCC)- (present on admission)   Assessment & Plan    - BUN/Cr 20/2.13 on admission. Baseline 1.2  - was 33/1.23 prior to discharge in Nov 2017  - Straight cath required for urinary retention on 12/30/17 - ? Residual  - BUN/Creat today 1/1/18 - 38/1.69  - Urine lytes normal  - xarelto switched to heparin --> eliquis  Plan:  - not for further intervention/investigations  - not for daily labs, IV, --> patient much more cooperative since stopping daily labs, IVs  - Continue supportive pulm meds for COPD        Chronic pulmonary embolism (CMS-HCC)- (present on admission)   Assessment & Plan    - Chronically anticoagulated on xeralto for PEs. Will hold for now due to LINDSEY  - was on anti-coagulation with wt based heparin - d/c'ed this am, as patient doesn't want/like being attached to the drip  - Wells score 4.5, moderate risk for PE however given improvement of hypoxia, MS and HR and patient already on anticoagulation will not complete imaging or D-dimer.   - Renal function worse 1/1/18, and pt not keen on heparin drip, heparin d/c'ed on 1/1/18. Eliquis commenced 1/1/18  - not checking labs, patient now Hospice and much better after stopping invasive tests   Plan  - continue Eliquis        Hypertension   Assessment & Plan    - CKD stage 3-4  - BP poorly controlled past few days  - headaches intermittent, no chest pain/visual disturbances  - on metoprolol at home --> escalated to 75 and today to 100 BID  - Amlodipine 10 mg  - ? Consider Coreg instead  of metoprolol        Chronic low back pain   Assessment & Plan    - chronic   - uses gabapentin, MS Contin 15 mg TID at home  - been having tylenol, lidocaine and gabapentin  - pain refractory to above  - 8/10 in intensity  - Morphine (home med) started at half-dose 7.5 mg TID        Anemia   Assessment & Plan    - Hgb 10.4 on admission.  at baseline.   - Iron low 14, TIBC 288, % sat 5, ferritin normal 111.2. Possible secondary to anemia of chronic disease.           Cellulitis of both lower extremities   Assessment & Plan    - followed by wound care as outpatient for B/L LE cellulitis   - Wound team review noted --> signed off by wound care  - Started on Unasyn on admission, discontinued as exam consistent with chronic cellulitis  - afebrile, WBC now normal   - ctm  - not for daily labs        Paroxysmal atrial fibrillation (CMS-HCC)- (present on admission)   Assessment & Plan    - Sinus tachycardia on EKG this admission  - On xeralto at home. Held on admission for LINDSEY  - Anticoagulation switched from heparin to eliquis   - Echo NOv 2017 : LV EF 60%, RVSP 16 mm Hg              Alondra Poon

## 2018-01-07 PROBLEM — J44.9 END STAGE COPD (HCC): Status: ACTIVE | Noted: 2017-12-30

## 2018-01-07 PROBLEM — J44.1 COPD WITH EXACERBATION (HCC): Status: ACTIVE | Noted: 2017-12-30

## 2018-01-07 PROCEDURE — 770006 HCHG ROOM/CARE - MED/SURG/GYN SEMI*

## 2018-01-07 PROCEDURE — 700102 HCHG RX REV CODE 250 W/ 637 OVERRIDE(OP): Performed by: STUDENT IN AN ORGANIZED HEALTH CARE EDUCATION/TRAINING PROGRAM

## 2018-01-07 PROCEDURE — A9270 NON-COVERED ITEM OR SERVICE: HCPCS | Performed by: INTERNAL MEDICINE

## 2018-01-07 PROCEDURE — 99232 SBSQ HOSP IP/OBS MODERATE 35: CPT | Mod: GC | Performed by: INTERNAL MEDICINE

## 2018-01-07 PROCEDURE — 700102 HCHG RX REV CODE 250 W/ 637 OVERRIDE(OP): Performed by: INTERNAL MEDICINE

## 2018-01-07 PROCEDURE — A9270 NON-COVERED ITEM OR SERVICE: HCPCS | Performed by: STUDENT IN AN ORGANIZED HEALTH CARE EDUCATION/TRAINING PROGRAM

## 2018-01-07 RX ORDER — CARVEDILOL 6.25 MG/1
12.5 TABLET ORAL 2 TIMES DAILY WITH MEALS
Status: DISCONTINUED | OUTPATIENT
Start: 2018-01-07 | End: 2018-01-11 | Stop reason: HOSPADM

## 2018-01-07 RX ADMIN — BUDESONIDE AND FORMOTEROL FUMARATE DIHYDRATE 2 PUFF: 160; 4.5 AEROSOL RESPIRATORY (INHALATION) at 09:20

## 2018-01-07 RX ADMIN — APIXABAN 5 MG: 5 TABLET, FILM COATED ORAL at 09:17

## 2018-01-07 RX ADMIN — TAMSULOSIN HYDROCHLORIDE 0.4 MG: 0.4 CAPSULE ORAL at 08:30

## 2018-01-07 RX ADMIN — AMLODIPINE BESYLATE 10 MG: 10 TABLET ORAL at 09:17

## 2018-01-07 RX ADMIN — APIXABAN 5 MG: 5 TABLET, FILM COATED ORAL at 20:56

## 2018-01-07 RX ADMIN — CARVEDILOL 12.5 MG: 6.25 TABLET, FILM COATED ORAL at 09:17

## 2018-01-07 RX ADMIN — Medication 1 PACKET: at 09:17

## 2018-01-07 RX ADMIN — ACETAMINOPHEN 650 MG: 325 TABLET, FILM COATED ORAL at 11:31

## 2018-01-07 RX ADMIN — MORPHINE SULFATE 7.5 MG: 15 TABLET ORAL at 01:30

## 2018-01-07 RX ADMIN — POLYETHYLENE GLYCOL (3350) 1 PACKET: 17 POWDER, FOR SOLUTION ORAL at 11:33

## 2018-01-07 RX ADMIN — GABAPENTIN 300 MG: 300 CAPSULE ORAL at 20:57

## 2018-01-07 RX ADMIN — BUDESONIDE AND FORMOTEROL FUMARATE DIHYDRATE 2 PUFF: 160; 4.5 AEROSOL RESPIRATORY (INHALATION) at 20:56

## 2018-01-07 RX ADMIN — CARVEDILOL 12.5 MG: 6.25 TABLET, FILM COATED ORAL at 16:40

## 2018-01-07 ASSESSMENT — ENCOUNTER SYMPTOMS
CONSTIPATION: 0
ABDOMINAL PAIN: 0
DOUBLE VISION: 0
CHILLS: 0
PALPITATIONS: 0
FOCAL WEAKNESS: 0
BLURRED VISION: 0
VOMITING: 0
POLYDIPSIA: 0
BRUISES/BLEEDS EASILY: 0
BLOOD IN STOOL: 0
WEAKNESS: 1
SPUTUM PRODUCTION: 0
FEVER: 0
MEMORY LOSS: 0
ORTHOPNEA: 1
DIARRHEA: 0
NERVOUS/ANXIOUS: 0
SORE THROAT: 0
PHOTOPHOBIA: 0
MYALGIAS: 0
HEADACHES: 0
BACK PAIN: 1
COUGH: 0
SHORTNESS OF BREATH: 1
NAUSEA: 0
WHEEZING: 0
FALLS: 0
DIZZINESS: 0
SENSORY CHANGE: 0

## 2018-01-07 ASSESSMENT — PAIN SCALES - GENERAL
PAINLEVEL_OUTOF10: 4
PAINLEVEL_OUTOF10: 8
PAINLEVEL_OUTOF10: 8
PAINLEVEL_OUTOF10: 4
PAINLEVEL_OUTOF10: 2
PAINLEVEL_OUTOF10: 3
PAINLEVEL_OUTOF10: 4

## 2018-01-07 ASSESSMENT — LIFESTYLE VARIABLES: SUBSTANCE_ABUSE: 0

## 2018-01-07 NOTE — CARE PLAN
Problem: Infection  Goal: Will remain free from infection  Outcome: PROGRESSING AS EXPECTED  No s/s of infection. Hand washing is used before and after entering the room    Problem: Venous Thromboembolism (VTW)/Deep Vein Thrombosis (DVT) Prevention:  Goal: Patient will participate in Venous Thrombosis (VTE)/Deep Vein Thrombosis (DVT)Prevention Measures  Outcome: PROGRESSING AS EXPECTED  No s/s of DVT. Pt is on apixaban. Pt walks occasionally.

## 2018-01-07 NOTE — PROGRESS NOTES
Internal Medicine Interval Note  Note Author: Alondra Poon M.D.     Name MARSHAL Calzada       1940   Age/Sex 77 y.o. male   MRN [id number]   Code Status  DNR, DNI     After 5PM or if no immediate response to page, please call for cross-coverage  Attending/Team: Dr. Quach/ David See Patient List for primary contact information  Call [phone] to page    1st Call - Day Intern (R1):   Dr. Poon 2nd Call - Day Sr. Resident (R2/R3):   Dr. Ortega         Reason for interval visit  (Principal Problem)   COPD (chronic obstructive pulmonary disease) with acute bronchitis (CMS-[facility])    Interval Problem Daily Status Update  (24 hours)     77 year old male with complex medical history presents with acute on chronic hypoxic and hypercapnic respiratory failure likely secondary to COPD exacerbation, re-admission following discharge for the same in 2017 requiring intubation    - uneventful night  - BP yesterday better 150s-160s, better than before (was 180-190), was 169/80 last night  - vet c/o intermittent headaches, although better than before  - beta-blocker switched to Coreg 12.5 mg BID this am  - holding ARB/ACEI and diuretics in view of renal impairement  - on Eliquis for paroxysmal AFib, and prior DVTs and PEs     - ongoing chronic low back pain, refractory to tylenol, lidocaine, gabapentin  - restarted home morphine sulphate but at half dose - was receiving 15 mg TID prior to admission, started 7.5 mg TID on 18.  - back pain better since morphine    - no routine labs / IV cannulas / EKG / X-rays. Keep patient comfortable   - a/w'ing placement  - patient still isn't completely aware that he's going to SNF (as per family's request)  - patient this morning started enquiring how long would he be here, and prefers to be discharged  - informed patient to discuss with family when he meets them this afternoon/evening      Review of Systems   Constitutional: Positive for malaise/fatigue. Negative for chills  and fever.        Very pleasant and cooperative   HENT: Negative for congestion, hearing loss, nosebleeds, sore throat and tinnitus.    Eyes: Negative for blurred vision, double vision and photophobia.        Chronic blurred vision, wears glasses   Respiratory: Positive for shortness of breath. Negative for cough, sputum production and wheezing.    Cardiovascular: Positive for orthopnea and leg swelling. Negative for chest pain and palpitations.   Gastrointestinal: Negative for abdominal pain, blood in stool, constipation, diarrhea, melena, nausea and vomiting.   Genitourinary: Negative for dysuria, frequency and urgency.   Musculoskeletal: Positive for back pain. Negative for falls and myalgias.   Skin: Negative for itching and rash.   Neurological: Positive for weakness. Negative for dizziness, sensory change, focal weakness and headaches.   Endo/Heme/Allergies: Negative for polydipsia. Does not bruise/bleed easily.   Psychiatric/Behavioral: Negative for memory loss and substance abuse. The patient is not nervous/anxious.        Consultants/Specialty  Hospice    Disposition  A/w SNF placement - hospice, SW and SNF coordinating    Quality Measures    Reviewed items::  EKG reviewed, Labs reviewed, Medications reviewed and Radiology images reviewed  Muniz catheter::  No Muniz  DVT: heparin discontinued, eliquis started.          Physical Exam       Vitals:    01/06/18 2100 01/07/18 0350 01/07/18 0732 01/07/18 1143   BP: 154/84 (!) 169/80 135/73 130/66   Pulse: 80 62 64 67   Resp: 20 18 18 18   Temp:  36.3 °C (97.4 °F) 36.4 °C (97.6 °F) 36.6 °C (97.8 °F)   SpO2: 92% 95% 94% 95%   Weight:       Height:         Body mass index is 34.74 kg/m².    Oxygen Therapy:  Pulse Oximetry: 95 %, O2 (LPM): 4, O2 Delivery: Nasal Cannula    Physical Exam   Constitutional:   Obese male,  in no acute distress, sitting in chair with 4 lts oxygen via NC   HENT:   Head: Normocephalic and atraumatic.   Mouth/Throat: Oropharynx is clear and  moist. No oropharyngeal exudate.   Eyes: Conjunctivae and EOM are normal. Pupils are equal, round, and reactive to light. No scleral icterus.   Neck: No JVD present.   Large neck circumference, no obvious JVD   Cardiovascular: Normal rate, regular rhythm, normal heart sounds and intact distal pulses.  Exam reveals no gallop and no friction rub.    No murmur heard.  Pulmonary/Chest: No stridor. No respiratory distress. He has no wheezes. He has no rales. He exhibits no tenderness.   Abdominal: Soft. Bowel sounds are normal. There is no tenderness. There is no rebound and no guarding.   Obese abdomen, well healed surgical scars   Musculoskeletal: He exhibits no edema, tenderness or deformity.   LE bandaged  Midline vertical low back scars ++   Lymphadenopathy:     He has no cervical adenopathy.   Neurological: He is alert. He displays normal reflexes. No cranial nerve deficit. He exhibits normal muscle tone.   Oriented x 2. Strength 4/4 RUE, R/LLE. 3/4 in LUE   Skin: Skin is warm and dry. No rash noted.   B/L LE with signs of chronic cellulitis, no signs of superinfection   Psychiatric: Affect and judgment normal.         Lab Data Review:         12/31/2017  12:07 PM    No results for input(s): SODIUM, POTASSIUM, CHLORIDE, CO2, BUN, CREATININE, MAGNESIUM, PHOSPHORUS, CALCIUM in the last 72 hours.    No results for input(s): ALTSGPT, ASTSGOT, ALKPHOSPHAT, TBILIRUBIN, DBILIRUBIN, GAMMAGT, AMYLASE, LIPASE, ALB, PREALBUMIN, GLUCOSE in the last 72 hours.    No results for input(s): RBC, HEMOGLOBIN, HEMATOCRIT, PLATELETCT, PROTHROMBTM, APTT, INR, IRON, FERRITIN, TOTIRONBC in the last 72 hours.              Assessment/Plan     * COPD with exacerbation (CMS-[facility])   Assessment & Plan    - 1-2 days of difficulty breathing and increased somnolence prior to admission. Concern for AMS on admission, now improved although pt appears to have some cognitive impairment  - Admitted in November 2017 for acute on chronic respiratory  failure. PCO2 70-80 on that admission required intubation   Flu negative.  - ABG showed pCO2 69.8. Bicarb 31  - ABG 12/31/17 --> pCO2 48.6,  PO2 62.8, pH 7.38  - Elevated WBC on admission, now normal   - Unasyn d/c 12/31/17, pro-calc was negative and no evidence for infection   - 12/31/17 : Code status discussed with patient and daughter. Daughter states that patient would not want chest compressions. But intubation would be ok for a short time on admission, family still agree at this time   Pt does not want to be hospitalized for his COPD anymore, agreeable to hospice consult, family contacted   Finished course of prednisone  Accepted by [facility]  Plan:  - continue ipratropium, levalbuterol (as pt is already tachycardic) while I/P  - a/w SNF --> [facility]/w [facility]  - accepted by [facility]            Hypertension   Assessment & Plan    - CKD stage 3-4  - BP poorly controlled past few days  - home dose of metoprolol escalated to 100 mg BID, Amlodipine added and escalated to 10 mg daily  - slightly better control with increased dose, but SBP  still > 150  - headaches intermittent (better), no chest pain/visual disturbances  Plan  - Coreg commenced today 12.5 mg BID  - d/c Metoprolol        Paroxysmal atrial fibrillation (CMS-[facility])- (present on admission)   Assessment & Plan    - Sinus tachycardia on EKG this admission  - On xeralto at home. Held on admission for LINDSEY  - Anticoagulation switched from heparin to eliquis   - Echo NOv 2017 : LV EF 60%, RVSP 16 mm Hg  Plan  - continue Eliquis (to prevent embolic CVA)          LINDSEY (acute kidney injury) (CMS-[facility])- (present on admission)   Assessment & Plan    - BUN/Cr 20/2.13 on admission. Baseline 1.2  - was 33/1.23 prior to discharge in Nov 2017  - Straight cath required for urinary retention on 12/30/17 - ? Residual  - BUN/Creat  1/1/18 - 38/1.69  - no labs since, as patient was irritable with repeat sticks and now on Hospice  - Urine  lytes normal  - xarelto switched to heparin --> eliquis  Plan:  - not for further intervention/investigations  - not for daily labs, IV, --> patient much more cooperative since stopping daily labs, IVs  - Continue supportive pulm meds for COPD        Chronic pulmonary embolism (CMS-HCC)- (present on admission)   Assessment & Plan    - Chronically anticoagulated on xeralto for PEs. Will hold for now due to LINDSEY  - was on anti-coagulation with wt based heparin - d/c'ed this am, as patient doesn't want/like being attached to the drip  - Wells score 4.5, moderate risk for PE however given improvement of hypoxia, MS and HR and patient already on anticoagulation will not complete imaging or D-dimer.   - Renal function worse 1/1/18, and pt not keen on heparin drip, heparin d/c'ed on 1/1/18. Eliquis commenced 1/1/18  - not checking labs, patient now Hospice and much better after stopping invasive tests   Plan  - continue Eliquis        Chronic low back pain   Assessment & Plan    - chronic   - uses gabapentin, MS Contin 15 mg TID at home  - been having tylenol, lidocaine and gabapentin  - pain refractory to above  - 8/10 in intensity  - Morphine (home med) started at half-dose 7.5 mg TID  - pain improved after commencing morphine        Anemia   Assessment & Plan    - Hgb 10.4 on admission.  at baseline.   - Iron low 14, TIBC 288, % sat 5, ferritin normal 111.2. Possible secondary to anemia of chronic disease.           Cellulitis of both lower extremities   Assessment & Plan    - followed by wound care as outpatient for B/L LE cellulitis   - Wound team review noted --> signed off by wound care  - Started on Unasyn on admission, discontinued as exam consistent with chronic cellulitis  - afebrile, WBC now normal   - ctm  - not for daily labs            Alondra Poon

## 2018-01-07 NOTE — CARE PLAN
Problem: Communication  Goal: The ability to communicate needs accurately and effectively will improve  Outcome: PROGRESSING AS EXPECTED  Pt able to verbalize needs.  Pt uses call light appropriately.       Problem: Safety  Goal: Will remain free from injury  Outcome: PROGRESSING AS EXPECTED  Pt remains free from injury, Floor clear from clutter and cords.  Pt A+OX4, Non-Skid socks, Bed/chair alarm off. Proper signs outside pt door in place. Door remains open.  Pt uses call light appropriately. Call light with in reach of pt.  Hourly rounding in place.    Problem: Venous Thromboembolism (VTW)/Deep Vein Thrombosis (DVT) Prevention:  Goal: Patient will participate in Venous Thrombosis (VTE)/Deep Vein Thrombosis (DVT)Prevention Measures  Outcome: PROGRESSING AS EXPECTED   01/07/18 0900   Mechanical/VTE Prophylaxis   Mechanical Prophylaxis  SCDs, Sequential Compression Device   SCDs, Sequential Compression Device Refused   OTHER   Risk Assessment Score 13   VTE RISK Very High   Pharmacologic Prophylaxis Used Apixaban       Problem: Respiratory:  Goal: Respiratory status will improve  Outcome: PROGRESSING AS EXPECTED  Pt remains on 4 liters via NC, extension tubing in place for pt to ambulate to bathroom with one assist.   in place while sitting in chair.  No noted S/S of SOB.  Compliant with scheduled medication/ in hailers.

## 2018-01-08 PROCEDURE — 700102 HCHG RX REV CODE 250 W/ 637 OVERRIDE(OP): Performed by: STUDENT IN AN ORGANIZED HEALTH CARE EDUCATION/TRAINING PROGRAM

## 2018-01-08 PROCEDURE — 99231 SBSQ HOSP IP/OBS SF/LOW 25: CPT | Mod: GC | Performed by: INTERNAL MEDICINE

## 2018-01-08 PROCEDURE — A9270 NON-COVERED ITEM OR SERVICE: HCPCS | Performed by: STUDENT IN AN ORGANIZED HEALTH CARE EDUCATION/TRAINING PROGRAM

## 2018-01-08 PROCEDURE — A9270 NON-COVERED ITEM OR SERVICE: HCPCS | Performed by: INTERNAL MEDICINE

## 2018-01-08 PROCEDURE — 770006 HCHG ROOM/CARE - MED/SURG/GYN SEMI*

## 2018-01-08 PROCEDURE — 700102 HCHG RX REV CODE 250 W/ 637 OVERRIDE(OP): Performed by: INTERNAL MEDICINE

## 2018-01-08 PROCEDURE — 700102 HCHG RX REV CODE 250 W/ 637 OVERRIDE(OP): Performed by: HOSPITALIST

## 2018-01-08 PROCEDURE — A9270 NON-COVERED ITEM OR SERVICE: HCPCS | Performed by: HOSPITALIST

## 2018-01-08 RX ADMIN — TAMSULOSIN HYDROCHLORIDE 0.4 MG: 0.4 CAPSULE ORAL at 10:15

## 2018-01-08 RX ADMIN — APIXABAN 5 MG: 5 TABLET, FILM COATED ORAL at 20:45

## 2018-01-08 RX ADMIN — AMLODIPINE BESYLATE 10 MG: 10 TABLET ORAL at 10:15

## 2018-01-08 RX ADMIN — CARVEDILOL 12.5 MG: 6.25 TABLET, FILM COATED ORAL at 10:15

## 2018-01-08 RX ADMIN — APIXABAN 5 MG: 5 TABLET, FILM COATED ORAL at 10:15

## 2018-01-08 RX ADMIN — CARVEDILOL 12.5 MG: 6.25 TABLET, FILM COATED ORAL at 16:37

## 2018-01-08 RX ADMIN — BUDESONIDE AND FORMOTEROL FUMARATE DIHYDRATE 2 PUFF: 160; 4.5 AEROSOL RESPIRATORY (INHALATION) at 20:46

## 2018-01-08 RX ADMIN — MORPHINE SULFATE 7.5 MG: 15 TABLET ORAL at 18:40

## 2018-01-08 RX ADMIN — GABAPENTIN 300 MG: 300 CAPSULE ORAL at 20:45

## 2018-01-08 RX ADMIN — BUDESONIDE AND FORMOTEROL FUMARATE DIHYDRATE 2 PUFF: 160; 4.5 AEROSOL RESPIRATORY (INHALATION) at 10:17

## 2018-01-08 RX ADMIN — ACETAMINOPHEN 650 MG: 325 TABLET, FILM COATED ORAL at 20:45

## 2018-01-08 RX ADMIN — QUETIAPINE FUMARATE 25 MG: 25 TABLET ORAL at 23:33

## 2018-01-08 ASSESSMENT — ENCOUNTER SYMPTOMS
COUGH: 0
DIZZINESS: 0
BLOOD IN STOOL: 0
HEADACHES: 0
MYALGIAS: 0
FOCAL WEAKNESS: 0
NERVOUS/ANXIOUS: 0
MEMORY LOSS: 0
SHORTNESS OF BREATH: 1
WHEEZING: 0
ABDOMINAL PAIN: 0
SENSORY CHANGE: 0
WEAKNESS: 1
NAUSEA: 0
PHOTOPHOBIA: 0
PALPITATIONS: 0
POLYDIPSIA: 0
BRUISES/BLEEDS EASILY: 0
VOMITING: 0
FEVER: 0
BACK PAIN: 1
ORTHOPNEA: 1
BLURRED VISION: 0
DIARRHEA: 0
FALLS: 0
DOUBLE VISION: 0
SORE THROAT: 0
CONSTIPATION: 0
SPUTUM PRODUCTION: 0
CHILLS: 0

## 2018-01-08 ASSESSMENT — LIFESTYLE VARIABLES: SUBSTANCE_ABUSE: 0

## 2018-01-08 ASSESSMENT — PAIN SCALES - GENERAL: PAINLEVEL_OUTOF10: 7

## 2018-01-08 NOTE — PROGRESS NOTES
Received shift report from Di STYLES and assumed care of this pt at 1900. Pt A&O x4, sitting in his bed.  Pt reports pain is 8/10, though pt doesn't want medications. Pt does call appropriately. Pt is SBA with a walker , steady. Pt is on 4 LPM with SaO2 94 . POC discussed as well as unit routine, comfort, and safety. Patient has call light and personal belongings within reach. Safety and fall precautions in place. Reviewed orders, notes, labs, and test results. Hourly rounding in place with RN rounding on odd hours and CNA on even hours.

## 2018-01-08 NOTE — DISCHARGE PLANNING
Called Emerson at  [facility], she has declined as pt needs long-term care bed and they have none.  Left message with Robin at [facility].

## 2018-01-08 NOTE — CARE PLAN
Problem: Safety  Goal: Will remain free from injury  Outcome: PROGRESSING AS EXPECTED  Pt remains free from injury, Floor clear from clutter and cords.  Pt A+OX4, Non-Skid socks, Bed/chair alarm currently off. Proper signs outside pt door in place. Door remains open.  Pt uses call light appropriately. Call light with in reach of pt.  Hourly rounding in place.

## 2018-01-08 NOTE — CARE PLAN
Problem: Venous Thromboembolism (VTW)/Deep Vein Thrombosis (DVT) Prevention:  Goal: Patient will participate in Venous Thrombosis (VTE)/Deep Vein Thrombosis (DVT)Prevention Measures   01/07/18 2050 01/08/18 0814   Mechanical/VTE Prophylaxis   Mechanical Prophylaxis  --  SCDs, Sequential Compression Device   SCDs, Sequential Compression Device --  Refused   OTHER   Risk Assessment Score --  13   VTE RISK --  Very High   Pharmacologic Prophylaxis Used Apixaban --        Problem: Mobility  Goal: Risk for activity intolerance will decrease  Frequent ambulation to bathroom and back to chair. Pt steady on feet with walker.     Problem: Pain Management  Goal: Pain level will decrease to patient's comfort goal  Pt pain remains controled with rest and repositioning as needed.

## 2018-01-08 NOTE — CARE PLAN
Problem: Infection  Goal: Will remain free from infection  Outcome: PROGRESSING AS EXPECTED  No s/s of infection. Hand washing is used before and after entering the room. Pt has no IV line.    Problem: Mobility  Goal: Risk for activity intolerance will decrease  Outcome: PROGRESSING AS EXPECTED  Pt can walk to the bathroom and back without getting tired. Pt gets to rest while using th toilet.

## 2018-01-09 PROBLEM — N18.9 ACUTE-ON-CHRONIC KIDNEY INJURY (HCC): Status: ACTIVE | Noted: 2017-11-16

## 2018-01-09 PROBLEM — L03.90 CHRONIC CELLULITIS: Status: ACTIVE | Noted: 2017-12-31

## 2018-01-09 PROCEDURE — 770006 HCHG ROOM/CARE - MED/SURG/GYN SEMI*

## 2018-01-09 PROCEDURE — A9270 NON-COVERED ITEM OR SERVICE: HCPCS | Performed by: INTERNAL MEDICINE

## 2018-01-09 PROCEDURE — 700102 HCHG RX REV CODE 250 W/ 637 OVERRIDE(OP): Performed by: STUDENT IN AN ORGANIZED HEALTH CARE EDUCATION/TRAINING PROGRAM

## 2018-01-09 PROCEDURE — 700102 HCHG RX REV CODE 250 W/ 637 OVERRIDE(OP): Performed by: INTERNAL MEDICINE

## 2018-01-09 PROCEDURE — A9270 NON-COVERED ITEM OR SERVICE: HCPCS | Performed by: STUDENT IN AN ORGANIZED HEALTH CARE EDUCATION/TRAINING PROGRAM

## 2018-01-09 PROCEDURE — 99231 SBSQ HOSP IP/OBS SF/LOW 25: CPT | Mod: GC | Performed by: INTERNAL MEDICINE

## 2018-01-09 RX ADMIN — BUDESONIDE AND FORMOTEROL FUMARATE DIHYDRATE 2 PUFF: 160; 4.5 AEROSOL RESPIRATORY (INHALATION) at 19:57

## 2018-01-09 RX ADMIN — APIXABAN 5 MG: 5 TABLET, FILM COATED ORAL at 19:57

## 2018-01-09 RX ADMIN — CARVEDILOL 12.5 MG: 6.25 TABLET, FILM COATED ORAL at 16:58

## 2018-01-09 RX ADMIN — BUDESONIDE AND FORMOTEROL FUMARATE DIHYDRATE 2 PUFF: 160; 4.5 AEROSOL RESPIRATORY (INHALATION) at 08:43

## 2018-01-09 RX ADMIN — MORPHINE SULFATE 7.5 MG: 15 TABLET ORAL at 19:57

## 2018-01-09 RX ADMIN — GABAPENTIN 300 MG: 300 CAPSULE ORAL at 19:57

## 2018-01-09 RX ADMIN — AMLODIPINE BESYLATE 10 MG: 10 TABLET ORAL at 08:43

## 2018-01-09 RX ADMIN — APIXABAN 5 MG: 5 TABLET, FILM COATED ORAL at 08:43

## 2018-01-09 RX ADMIN — TAMSULOSIN HYDROCHLORIDE 0.4 MG: 0.4 CAPSULE ORAL at 08:43

## 2018-01-09 RX ADMIN — CARVEDILOL 12.5 MG: 6.25 TABLET, FILM COATED ORAL at 08:43

## 2018-01-09 ASSESSMENT — PAIN SCALES - GENERAL
PAINLEVEL_OUTOF10: 0
PAINLEVEL_OUTOF10: 4
PAINLEVEL_OUTOF10: 0
PAINLEVEL_OUTOF10: 0
PAINLEVEL_OUTOF10: 7

## 2018-01-09 ASSESSMENT — ENCOUNTER SYMPTOMS
SORE THROAT: 0
PALPITATIONS: 0
WHEEZING: 0
PHOTOPHOBIA: 0
COUGH: 0
HEADACHES: 0
DIARRHEA: 0
BLURRED VISION: 0
FALLS: 0
BACK PAIN: 1
CONSTIPATION: 0
BLOOD IN STOOL: 0
DOUBLE VISION: 0
ABDOMINAL PAIN: 0
DIZZINESS: 0
SPUTUM PRODUCTION: 0
NERVOUS/ANXIOUS: 0
ORTHOPNEA: 1
FEVER: 0
MEMORY LOSS: 0
WEAKNESS: 0
SENSORY CHANGE: 0
BRUISES/BLEEDS EASILY: 0
MYALGIAS: 0
CHILLS: 0
VOMITING: 0
FOCAL WEAKNESS: 0
NAUSEA: 0
SHORTNESS OF BREATH: 1
POLYDIPSIA: 0

## 2018-01-09 ASSESSMENT — LIFESTYLE VARIABLES: SUBSTANCE_ABUSE: 0

## 2018-01-09 NOTE — DISCHARGE PLANNING
[facility] inquiring about possible acceptance to [facility]. SW to follow up with [facility] decision.

## 2018-01-09 NOTE — CARE PLAN
Problem: Pain Management  Goal: Pain level will decrease to patient's comfort goal  Outcome: PROGRESSING AS EXPECTED  Patient c/o head and back pain. Tylenol 650 mg PRN Q6 given and pain moderately relieved.

## 2018-01-09 NOTE — PROGRESS NOTES
Internal Medicine Interval Note  Note Author: Alondra Poon M.D.     Name MARSHAL Calzada       1940   Age/Sex 77 y.o. male   MRN [id number]   Code Status  DNR, DNI     After 5PM or if no immediate response to page, please call for cross-coverage  Attending/Team: Dr. Guillen / David See Patient List for primary contact information  Call [phone] to page    1st Call - Day Intern (R1):   Dr. Poon 2nd Call - Day Sr. Resident (R2/R3):   Dr. Calero         Reason for interval visit  (Principal Problem)   COPD with exacerbation (CMS-[facility])    Interval Problem Daily Status Update  (24 hours)     - no new changes  - SW working on placement  - refused by [facility] & a/w from [place]  - BP controlled  - pain and headaches better    - ambulate patient AD DORY    Review of Systems   Constitutional: Positive for malaise/fatigue. Negative for chills and fever.        Very pleasant and cooperative   HENT: Negative for congestion, hearing loss, nosebleeds, sore throat and tinnitus.    Eyes: Negative for blurred vision, double vision and photophobia.        Chronic blurred vision, wears glasses   Respiratory: Positive for shortness of breath. Negative for cough, sputum production and wheezing.    Cardiovascular: Positive for orthopnea and leg swelling. Negative for chest pain and palpitations.   Gastrointestinal: Negative for abdominal pain, blood in stool, constipation, diarrhea, melena, nausea and vomiting.   Genitourinary: Negative for dysuria, frequency and urgency.   Musculoskeletal: Positive for back pain. Negative for falls and myalgias.   Skin: Negative for itching and rash.   Neurological: Positive for weakness. Negative for dizziness, sensory change, focal weakness and headaches.   Endo/Heme/Allergies: Negative for polydipsia. Does not bruise/bleed easily.   Psychiatric/Behavioral: Negative for memory loss and substance abuse. The patient is not nervous/anxious.         Consultants/Specialty  Hospice    Disposition  A/w SNF placement - hospice, SW and SNF coordinating    Quality Measures    Reviewed items::  EKG reviewed, Labs reviewed, Medications reviewed and Radiology images reviewed  Muniz catheter::  No Muniz  DVT: heparin discontinued, eliquis started.          Physical Exam       Vitals:    01/08/18 0335 01/08/18 0814 01/08/18 1600 01/08/18 1637   BP: 148/74 137/62 151/63 101/63   Pulse: 70 76 75 65   Resp: 18 20 20    Temp: 36.3 °C (97.4 °F) 36.9 °C (98.5 °F) 36.6 °C (97.8 °F)    SpO2: 94% 97% 96%    Weight:       Height:         Body mass index is 34.8 kg/m². Weight: 106.9 kg (235 lb 10.8 oz)  Oxygen Therapy:  Pulse Oximetry: 96 %, O2 (LPM): 4, O2 Delivery: Nasal Cannula    Physical Exam   Constitutional:   Obese male,  in no acute distress, sitting in chair with 4 lts oxygen via NC   HENT:   Head: Normocephalic and atraumatic.   Mouth/Throat: Oropharynx is clear and moist. No oropharyngeal exudate.   Eyes: Conjunctivae and EOM are normal. Pupils are equal, round, and reactive to light. No scleral icterus.   Neck: No JVD present.   Large neck circumference, no obvious JVD   Cardiovascular: Normal rate, regular rhythm, normal heart sounds and intact distal pulses.  Exam reveals no gallop and no friction rub.    No murmur heard.  Pulmonary/Chest: No stridor. No respiratory distress. He has no wheezes. He has no rales. He exhibits no tenderness.   Abdominal: Soft. Bowel sounds are normal. There is no tenderness. There is no rebound and no guarding.   Obese abdomen, well healed surgical scars   Musculoskeletal: He exhibits no edema, tenderness or deformity.   LE bandaged  Midline vertical low back scars ++   Lymphadenopathy:     He has no cervical adenopathy.   Neurological: He is alert. He displays normal reflexes. No cranial nerve deficit. He exhibits normal muscle tone.   Oriented x 2. Strength 4/4 RUE, R/LLE. 3/4 in LUE   Skin: Skin is warm and dry. No rash noted.    B/L LE with signs of chronic cellulitis, no signs of superinfection   Psychiatric: Affect and judgment normal.         Lab Data Review:         12/31/2017  12:07 PM    No results for input(s): SODIUM, POTASSIUM, CHLORIDE, CO2, BUN, CREATININE, MAGNESIUM, PHOSPHORUS, CALCIUM in the last 72 hours.    No results for input(s): ALTSGPT, ASTSGOT, ALKPHOSPHAT, TBILIRUBIN, DBILIRUBIN, GAMMAGT, AMYLASE, LIPASE, ALB, PREALBUMIN, GLUCOSE in the last 72 hours.    No results for input(s): RBC, HEMOGLOBIN, HEMATOCRIT, PLATELETCT, PROTHROMBTM, APTT, INR, IRON, FERRITIN, TOTIRONBC in the last 72 hours.              Assessment/Plan     * COPD with exacerbation (CMS-HCC)   Assessment & Plan    - 1-2 days of difficulty breathing and increased somnolence prior to admission. Concern for AMS on admission, now improved although pt appears to have some cognitive impairment  - Admitted in November 2017 for acute on chronic respiratory failure. PCO2 70-80 on that admission required intubation   Flu negative.  - ABG showed pCO2 69.8. Bicarb 31  - ABG 12/31/17 --> pCO2 48.6,  PO2 62.8, pH 7.38   - 12/31/17 : Code status discussed with patient and daughter.DNR, DNI.  - Pt does not want to be hospitalized for his COPD anymore, agreeable to hospice consult, family contacted   - Finished course of prednisone  - Accepted by [facility]  Plan:  - continue ipratropium, levalbuterol (as pt is already tachycardic) while I/P  - a/w SNF --> [facility]w [facility]  - accepted by [facility]            Hypertension   Assessment & Plan    - CKD stage 3-4  - BP better controlled on Coreg 12.5 mg BID  Plan  - ctm        Paroxysmal atrial fibrillation (CMS-HCC)- (present on admission)   Assessment & Plan    - Sinus tachycardia on EKG this admission  - On xeralto at home. Held on admission for LINDSEY  - Anticoagulation switched from heparin to eliquis   - Echo NOv 2017 : LV EF 60%, RVSP 16 mm Hg  Plan  - continue Eliquis (to prevent  embolic CVA)          LINDSEY (acute kidney injury) (CMS-HCC)- (present on admission)   Assessment & Plan    - BUN/Cr 20/2.13 on admission. Baseline 1.2  - was 33/1.23 prior to discharge in Nov 2017  - Straight cath required for urinary retention on 12/30/17 - ? Residual  - BUN/Creat  1/1/18 - 38/1.69  - no labs since, as patient was irritable with repeat sticks and now on Hospice  - Urine lytes normal  - xarelto switched to heparin --> eliquis  Plan:  - not for further intervention/investigations  - not for daily labs, IV, --> patient much more cooperative since stopping daily labs, IVs  - Continue supportive pulm meds for COPD        Chronic pulmonary embolism (CMS-HCC)- (present on admission)   Assessment & Plan    - Chronically anticoagulated on xeralto for PEs. Will hold for now due to LINDSEY  - was on anti-coagulation with wt based heparin - d/c'ed this am, as patient doesn't want/like being attached to the drip  - Wells score 4.5, moderate risk for PE however given improvement of hypoxia, MS and HR and patient already on anticoagulation will not complete imaging or D-dimer.   - Renal function worse 1/1/18, and pt not keen on heparin drip, heparin d/c'ed on 1/1/18. Eliquis commenced 1/1/18  - not checking labs, patient now Hospice and much better after stopping invasive tests   Plan  - continue Eliquis        Chronic low back pain   Assessment & Plan    - chronic   - uses gabapentin, MS Contin 15 mg TID at home  - been having tylenol, lidocaine and gabapentin  - pain refractory to above  - 8/10 in intensity  - Morphine (home med) started at half-dose 7.5 mg TID  - pain improved after commencing morphine        Anemia   Assessment & Plan    - Hgb 10.4 on admission.  at baseline.   - Iron low 14, TIBC 288, % sat 5, ferritin normal 111.2. Possible secondary to anemia of chronic disease.           Cellulitis of both lower extremities   Assessment & Plan    - followed by wound care as outpatient for B/L LE cellulitis   -  Wound team review noted --> signed off by wound care  - ctm  - not for daily labs

## 2018-01-09 NOTE — PROGRESS NOTES
Internal Medicine Interval Note  Note Author: Alondra Poon M.D.     Name MARSHAL Calzada       1940   Age/Sex 77 y.o. male   MRN [id number]   Code Status  DNR, DNI     After 5PM or if no immediate response to page, please call for cross-coverage  Attending/Team: Dr. Guillen / David See Patient List for primary contact information  Call [phone] to page    1st Call - Day Intern (R1):   Dr. Poon 2nd Call - Day Sr. Resident (R2/R3):   Dr. Calero         Reason for interval visit  (Principal Problem)   COPD with exacerbation (CMS-[facility])    Interval Problem Daily Status Update  (24 hours)     - no new issues  - SW waiting to hear back from [place]  - patient ambulating with walker, and feels more cheerful now that he has a bit more freedom.     Review of Systems   Constitutional: Positive for malaise/fatigue. Negative for chills and fever.        Very pleasant and cooperative   HENT: Negative for congestion, hearing loss, nosebleeds, sore throat and tinnitus.    Eyes: Negative for blurred vision, double vision and photophobia.        Chronic blurred vision, wears glasses   Respiratory: Positive for shortness of breath. Negative for cough, sputum production and wheezing.    Cardiovascular: Positive for orthopnea and leg swelling. Negative for chest pain and palpitations.   Gastrointestinal: Negative for abdominal pain, blood in stool, constipation, diarrhea, melena, nausea and vomiting.   Genitourinary: Negative for dysuria, frequency and urgency.   Musculoskeletal: Positive for back pain. Negative for falls and myalgias.   Skin: Negative for itching and rash.   Neurological: Negative for dizziness, sensory change, focal weakness, weakness and headaches.   Endo/Heme/Allergies: Negative for polydipsia. Does not bruise/bleed easily.   Psychiatric/Behavioral: Negative for memory loss and substance abuse. The patient is not nervous/anxious.        Consultants/Specialty  Hospice    Disposition  A/w SNF placement  - hospice, SW and SNF coordinating    Quality Measures    Reviewed items::  EKG reviewed, Labs reviewed, Medications reviewed and Radiology images reviewed  Muniz catheter::  No Muniz  DVT: heparin discontinued, eliquis started.          Physical Exam       Vitals:    01/08/18 1637 01/08/18 1915 01/09/18 0315 01/09/18 0800   BP: 101/63 129/58 153/80 (!) 169/69   Pulse: 65 83 69 74   Resp:  18 16 18   Temp:  37 °C (98.6 °F) 36.2 °C (97.1 °F) 36.9 °C (98.5 °F)   SpO2:  91% 98% 97%   Weight:       Height:         Body mass index is 34.8 kg/m².    Oxygen Therapy:  Pulse Oximetry: 97 %, O2 (LPM): 4, O2 Delivery: Nasal Cannula    Physical Exam   Constitutional: He is oriented to person, place, and time.   in no acute distress, sitting in chair with 4 lts oxygen via NC and conversing with another patient   HENT:   Head: Normocephalic and atraumatic.   Mouth/Throat: Oropharynx is clear and moist. No oropharyngeal exudate.   Eyes: Conjunctivae and EOM are normal. Pupils are equal, round, and reactive to light. No scleral icterus.   Neck: No JVD present.   Large neck circumference, no obvious JVD   Cardiovascular: Normal rate, regular rhythm, normal heart sounds and intact distal pulses.  Exam reveals no gallop and no friction rub.    No murmur heard.  Pulmonary/Chest: No stridor. No respiratory distress. He has no wheezes. He has no rales. He exhibits no tenderness.   Abdominal: Soft. Bowel sounds are normal. There is no tenderness. There is no rebound and no guarding.   Obese abdomen, well healed surgical scars   Musculoskeletal: He exhibits no edema, tenderness or deformity.   LE bandaged  Midline vertical low back scars ++   Lymphadenopathy:     He has no cervical adenopathy.   Neurological: He is alert and oriented to person, place, and time. He displays normal reflexes. No cranial nerve deficit. He exhibits normal muscle tone.   Skin: Skin is warm and dry. No rash noted.   B/L LE with signs of chronic cellulitis, no  signs of infection   Psychiatric: Affect and judgment normal.         Lab Data Review:         12/31/2017  12:07 PM    No results for input(s): SODIUM, POTASSIUM, CHLORIDE, CO2, BUN, CREATININE, MAGNESIUM, PHOSPHORUS, CALCIUM in the last 72 hours.    No results for input(s): ALTSGPT, ASTSGOT, ALKPHOSPHAT, TBILIRUBIN, DBILIRUBIN, GAMMAGT, AMYLASE, LIPASE, ALB, PREALBUMIN, GLUCOSE in the last 72 hours.    No results for input(s): RBC, HEMOGLOBIN, HEMATOCRIT, PLATELETCT, PROTHROMBTM, APTT, INR, IRON, FERRITIN, TOTIRONBC in the last 72 hours.              Assessment/Plan     * COPD with exacerbation (CMS-HCC)   Assessment & Plan    - Admitted in November 2017 for acute on chronic respiratory failure. PCO2 70-80 on that admission required intubation   Flu negative.  - ABG on admission pCO2 69.8. Bicarb 31  - 12/31/17 : Code status discussed with patient and daughter.DNR, DNI.  - Pt does not want to be hospitalized for his COPD anymore, family in agreement, accepted by [facility].  Plan:  - continue COPD meds  - a/w SNF --> a/w [facility] approval [facility]  - accepted by [facility]            Hypertension   Assessment & Plan    - CKD stage 3-4  - BP better controlled on Coreg 12.5 mg BID  Plan  - ctm        Paroxysmal atrial fibrillation (CMS-HCC)- (present on admission)   Assessment & Plan    - Sinus tachycardia on EKG this admission  - On xeralto at home. Held on admission for LINDSEY  - Anticoagulation switched from heparin to eliquis   - Echo NOv 2017 : LV EF 60%, RVSP 16 mm Hg  Plan  - Eliquis (conitnue to prevent embolic CVA  Vs d/c given Hospice ?)          Acute-on-chronic kidney injury (CMS-HCC)- (present on admission)   Assessment & Plan    - BUN/Cr 20/2.13 on admission. Baseline 1.2  - was 33/1.23 prior to discharge in Nov 2017  - BUN/Creat  1/1/18 - 38/1.69  - no labs since, as patient was irritable with repeat sticks and now on Hospice  - xarelto switched to heparin --> eliquis   --> patient  much more cooperative since stopping daily labs, IVs  Plan:  - not for further intervention/investigations  - not for daily labs, IV  - Continue supportive pulm meds for COPD        Chronic pulmonary embolism (CMS-HCC)- (present on admission)   Assessment & Plan    - Chronically anticoagulated on xeralto for PEs. Will hold for now due to LINDSEY  - was on anti-coagulation with wt based heparin - d/c'ed as patient doesn't want/like being attached to drip  - Wells score 4.5, moderate risk for PE however given improvement of hypoxia and HR and patient already on anticoagulation not for imaging or D-dimer.   - Renal function worse 1/1/18, and pt not keen on heparin drip, heparin d/c'ed on 1/1/18. Eliquis commenced 1/1/18  - not checking labs, patient now Hospice and much better after stopping invasive tests   Plan  - continue Eliquis --> given Hospice --> continue vs d/c ?        Chronic low back pain   Assessment & Plan    - chronic   - uses gabapentin, MS Contin 15 mg TID at home  - had tylenol, lidocaine and gabapentin  - pain refractory to above  - Morphine (home med) started at half-dose 7.5 mg TID  - pain improved after commencing morphine        Anemia   Assessment & Plan    - Hgb 10.4 on admission.  at baseline.   - Iron low 14, TIBC 288, % sat 5, ferritin normal 111.2.   - Possible secondary to anemia of chronic disease.           Chronic cellulitis   Assessment & Plan    - B/L LE  - signed off by wound care  - no e/o active infecttion  - ctm

## 2018-01-09 NOTE — CARE PLAN
Problem: Mobility  Goal: Risk for activity intolerance will decrease  Outcome: PROGRESSING AS EXPECTED  Patient up ad carlie with front wheel walker. Patient ambulated hopper multiple times. Patient understands fall precautions and will call for assistance if needed.

## 2018-01-09 NOTE — DISCHARGE PLANNING
CCS Spoke to Tracy from [place]. updated referral has been sent. Tracy will call the [facility] for coverage.

## 2018-01-09 NOTE — DISCHARGE PLANNING
Barrier to discharge has been and continues to be SNF obtaining auth from VA to cover room and board while pt under hospice care. Nida requested and received re-eval today. [facility] is ready to initiate care as soon as accepted to SNF.

## 2018-01-10 PROCEDURE — 770006 HCHG ROOM/CARE - MED/SURG/GYN SEMI*

## 2018-01-10 PROCEDURE — 700102 HCHG RX REV CODE 250 W/ 637 OVERRIDE(OP): Performed by: INTERNAL MEDICINE

## 2018-01-10 PROCEDURE — 700102 HCHG RX REV CODE 250 W/ 637 OVERRIDE(OP): Performed by: STUDENT IN AN ORGANIZED HEALTH CARE EDUCATION/TRAINING PROGRAM

## 2018-01-10 PROCEDURE — A9270 NON-COVERED ITEM OR SERVICE: HCPCS | Performed by: INTERNAL MEDICINE

## 2018-01-10 PROCEDURE — 99231 SBSQ HOSP IP/OBS SF/LOW 25: CPT | Mod: GC | Performed by: INTERNAL MEDICINE

## 2018-01-10 PROCEDURE — A9270 NON-COVERED ITEM OR SERVICE: HCPCS | Performed by: STUDENT IN AN ORGANIZED HEALTH CARE EDUCATION/TRAINING PROGRAM

## 2018-01-10 RX ADMIN — BUDESONIDE AND FORMOTEROL FUMARATE DIHYDRATE 2 PUFF: 160; 4.5 AEROSOL RESPIRATORY (INHALATION) at 07:51

## 2018-01-10 RX ADMIN — CARVEDILOL 12.5 MG: 6.25 TABLET, FILM COATED ORAL at 17:59

## 2018-01-10 RX ADMIN — CARVEDILOL 12.5 MG: 6.25 TABLET, FILM COATED ORAL at 07:47

## 2018-01-10 RX ADMIN — GABAPENTIN 300 MG: 300 CAPSULE ORAL at 21:01

## 2018-01-10 RX ADMIN — APIXABAN 5 MG: 5 TABLET, FILM COATED ORAL at 21:01

## 2018-01-10 RX ADMIN — AMLODIPINE BESYLATE 10 MG: 10 TABLET ORAL at 07:47

## 2018-01-10 RX ADMIN — ACETAMINOPHEN 650 MG: 325 TABLET, FILM COATED ORAL at 23:21

## 2018-01-10 RX ADMIN — TAMSULOSIN HYDROCHLORIDE 0.4 MG: 0.4 CAPSULE ORAL at 07:47

## 2018-01-10 RX ADMIN — MORPHINE SULFATE 7.5 MG: 15 TABLET ORAL at 21:01

## 2018-01-10 RX ADMIN — ACETAMINOPHEN 650 MG: 325 TABLET, FILM COATED ORAL at 06:15

## 2018-01-10 RX ADMIN — BUDESONIDE AND FORMOTEROL FUMARATE DIHYDRATE 2 PUFF: 160; 4.5 AEROSOL RESPIRATORY (INHALATION) at 21:06

## 2018-01-10 RX ADMIN — ACETAMINOPHEN 650 MG: 325 TABLET, FILM COATED ORAL at 11:33

## 2018-01-10 RX ADMIN — APIXABAN 5 MG: 5 TABLET, FILM COATED ORAL at 07:47

## 2018-01-10 ASSESSMENT — ENCOUNTER SYMPTOMS
COUGH: 0
ABDOMINAL PAIN: 0
NAUSEA: 0
NERVOUS/ANXIOUS: 0
CONSTIPATION: 0
SPUTUM PRODUCTION: 0
MYALGIAS: 0
ORTHOPNEA: 1
BLURRED VISION: 0
MEMORY LOSS: 0
BRUISES/BLEEDS EASILY: 0
CHILLS: 0
WEAKNESS: 0
PALPITATIONS: 0
SENSORY CHANGE: 0
FOCAL WEAKNESS: 0
BLOOD IN STOOL: 0
VOMITING: 0
WHEEZING: 0
HEADACHES: 0
FEVER: 0
POLYDIPSIA: 0
DOUBLE VISION: 0
SORE THROAT: 0
FALLS: 0
DIZZINESS: 0
BACK PAIN: 1
PHOTOPHOBIA: 0
DIARRHEA: 0
SHORTNESS OF BREATH: 1

## 2018-01-10 ASSESSMENT — PAIN SCALES - GENERAL
PAINLEVEL_OUTOF10: 7
PAINLEVEL_OUTOF10: 7
PAINLEVEL_OUTOF10: 8

## 2018-01-10 ASSESSMENT — LIFESTYLE VARIABLES: SUBSTANCE_ABUSE: 0

## 2018-01-10 NOTE — DISCHARGE PLANNING
SW spoke to pt regarding dc home, pt would like to do so with [facility].  SW spoke to pt's dtr, Araceli, and she is fine with pt discharging home tomorrow with Hospice.  SW attempted to speak to pt's other dtr, Magalie, however she did not  and her VM is full.      JENSEN Donovan left a VM for Heide with Katelynn.     LORENE spoke to Dr. Poon regarding the above, and she is okay with pt discharging tomorrow with hospice.

## 2018-01-10 NOTE — DISCHARGE PLANNING
Received call back from Cait Arango at [facility], They will not cover SNF as they do not feel pt meets SNF criteria. SW will work on alternative plan.

## 2018-01-10 NOTE — DISCHARGE PLANNING
Received call from Cait Paniagua at [facility], she has requested information from [place] regarding pt's need for SNF. IF pt does not need SNF LOC, the VA will not be able to pay for SNF. The VA does not cover group home. Waiting for VA decision.

## 2018-01-10 NOTE — CARE PLAN
Problem: Safety  Goal: Will remain free from injury  Outcome: PROGRESSING AS EXPECTED  Pt remains free from injury, Floor clear from clutter and cords.  Pt A+OX4, Non-Skid socks, Bed/chair alarm off. Proper signs outside pt door in place. Door remains open.  Pt uses call light appropriately. Call light with in reach of pt.  Hourly rounding in place.    Problem: Venous Thromboembolism (VTW)/Deep Vein Thrombosis (DVT) Prevention:  Goal: Patient will participate in Venous Thrombosis (VTE)/Deep Vein Thrombosis (DVT)Prevention Measures  Outcome: PROGRESSING AS EXPECTED   01/08/18 2020 01/09/18 0848   Mechanical/VTE Prophylaxis   Mechanical Prophylaxis  SCDs, Sequential Compression Device --    SCDs, Sequential Compression Device Refused --    OTHER   Risk Assessment Score --  11   VTE RISK --  Very High   Pharmacologic Prophylaxis Used Apixaban --        Problem: Respiratory:  Goal: Respiratory status will improve  Outcome: PROGRESSING AS EXPECTED  Pt remains on 4 liters of O2 via NC. Pt SOB with increased ambulation, SOB tapers off at rest.      Problem: Mobility  Goal: Risk for activity intolerance will decrease  Outcome: PROGRESSING AS EXPECTED  Frequent ambulation through halls with daughter. Pt steady on feet with walker.

## 2018-01-10 NOTE — DISCHARGE PLANNING
Called and left message with Cait Arango at [facility] requesting call back. If VA unable to cover R&B at SNF, will need alternative plan.

## 2018-01-10 NOTE — CARE PLAN
Problem: Communication  Goal: The ability to communicate needs accurately and effectively will improve  Outcome: PROGRESSING AS EXPECTED  Reoriented pt to environment as needed; white board updated with [facility] staff and return time. Pt notified of hourly rounding and unit routine.    Appropriate signs in place at doorway for pt. Pt allowed time to ask questions; no questions at this time.    Problem: Pain Management  Goal: Pain level will decrease to patient's comfort goal  Outcome: PROGRESSING AS EXPECTED  Pt assessed for pain level routinely. Pain level of 7/10 of Lower Back; pt states chronic back pain. Pt educated about around-the-clock px management and non-pharmacologic techniques. Pt verbalized understanding. PRN pain medication given per MAR.

## 2018-01-10 NOTE — PROGRESS NOTES
Internal Medicine Interval Note  Note Author: Alondra Poon M.D.     Name MARSHAL Calzada       1940   Age/Sex 77 y.o. male   MRN [id number]   Code Status  DNR, DNI     After 5PM or if no immediate response to page, please call for cross-coverage  Attending/Team: Dr. Guillen / David See Patient List for primary contact information  Call [phone] to page    1st Call - Day Intern (R1):   Dr. Poon 2nd Call - Day Sr. Resident (R2/R3):   Dr. Calero         Reason for interval visit  (Principal Problem)   COPD with exacerbation (CMS-[facility])    Interval Problem Daily Status Update  (24 hours)     - in status quo  - a/w ing placement  - mobilizing with walker  - accepted by Hospice  - refused by [facility] x 2, [place] x 2  - [facility]  - family aware, and ok for home with home hospice  - home tomorrow with home hospice      Review of Systems   Constitutional: Positive for malaise/fatigue. Negative for chills and fever.        Very pleasant and cooperative   HENT: Negative for congestion, hearing loss, nosebleeds, sore throat and tinnitus.    Eyes: Negative for blurred vision, double vision and photophobia.        Chronic blurred vision, wears glasses   Respiratory: Positive for shortness of breath. Negative for cough, sputum production and wheezing.    Cardiovascular: Positive for orthopnea and leg swelling. Negative for chest pain and palpitations.   Gastrointestinal: Negative for abdominal pain, blood in stool, constipation, diarrhea, melena, nausea and vomiting.   Genitourinary: Negative for dysuria, frequency and urgency.   Musculoskeletal: Positive for back pain. Negative for falls and myalgias.   Skin: Negative for itching and rash.   Neurological: Negative for dizziness, sensory change, focal weakness, weakness and headaches.   Endo/Heme/Allergies: Negative for polydipsia. Does not bruise/bleed easily.   Psychiatric/Behavioral: Negative for memory loss and substance  abuse. The patient is not nervous/anxious.        Consultants/Specialty  Hospice    Disposition  A/w SNF placement - hospice, SW and SNF coordinating    Quality Measures    Reviewed items::  EKG reviewed, Labs reviewed, Medications reviewed and Radiology images reviewed  Muniz catheter::  No Muniz  DVT: heparin discontinued, eliquis started.          Physical Exam       Vitals:    01/09/18 1600 01/09/18 2144 01/10/18 0449 01/10/18 0800   BP: 127/75 142/61 138/73 (!) 162/64   Pulse: 80 72 81 78   Resp: 18 20 18 20   Temp: 36.4 °C (97.5 °F) 36.3 °C (97.3 °F) 36.4 °C (97.5 °F) 36.4 °C (97.5 °F)   SpO2: 95% 95% 95% 96%   Weight:       Height:         Body mass index is 34.8 kg/m².    Oxygen Therapy:  Pulse Oximetry: 96 %, O2 (LPM): 4, O2 Delivery: Nasal Cannula    Physical Exam   Constitutional: He is oriented to person, place, and time.   in no acute distress, sitting in chair with 4 lts oxygen via NC and conversing with another patient   HENT:   Head: Normocephalic and atraumatic.   Mouth/Throat: Oropharynx is clear and moist. No oropharyngeal exudate.   Eyes: Conjunctivae and EOM are normal. Pupils are equal, round, and reactive to light. No scleral icterus.   Neck: No JVD present.   Large neck circumference, no obvious JVD   Cardiovascular: Normal rate, regular rhythm, normal heart sounds and intact distal pulses.  Exam reveals no gallop and no friction rub.    No murmur heard.  Pulmonary/Chest: No stridor. No respiratory distress. He has no wheezes. He has no rales. He exhibits no tenderness.   Abdominal: Soft. Bowel sounds are normal. There is no tenderness. There is no rebound and no guarding.   Obese abdomen, well healed surgical scars   Musculoskeletal: He exhibits no edema, tenderness or deformity.   LE bandaged  Midline vertical low back scars ++   Lymphadenopathy:     He has no cervical adenopathy.   Neurological: He is alert and oriented to person, place, and time. He displays normal reflexes. No cranial  nerve deficit. He exhibits normal muscle tone.   Skin: Skin is warm and dry. No rash noted.   B/L LE with signs of chronic cellulitis, no signs of infection   Psychiatric: Affect and judgment normal.         Lab Data Review:         12/31/2017  12:07 PM    No results for input(s): SODIUM, POTASSIUM, CHLORIDE, CO2, BUN, CREATININE, MAGNESIUM, PHOSPHORUS, CALCIUM in the last 72 hours.    No results for input(s): ALTSGPT, ASTSGOT, ALKPHOSPHAT, TBILIRUBIN, DBILIRUBIN, GAMMAGT, AMYLASE, LIPASE, ALB, PREALBUMIN, GLUCOSE in the last 72 hours.    No results for input(s): RBC, HEMOGLOBIN, HEMATOCRIT, PLATELETCT, PROTHROMBTM, APTT, INR, IRON, FERRITIN, TOTIRONBC in the last 72 hours.              Assessment/Plan     * COPD with exacerbation (CMS-HCC)   Assessment & Plan    - Admitted in November 2017 for acute on chronic respiratory failure. PCO2 70-80 on that admission required intubation   - Flu negative.  - ABG on admission pCO2 69.8. Bicarb 31  - 12/31/17 : Code status discussed with patient and daughter.DNR, DNI.  - Pt does not want to be hospitalized for his COPD anymore, family in agreement, accepted by [facility].  - declined by SNFs  Plan:  - continue COPD meds  - home 1/10/18 with home hospice            Hypertension   Assessment & Plan    - CKD stage 3-4  - BP better controlled on Coreg 12.5 mg BID  Plan  - ctm        Paroxysmal atrial fibrillation (CMS-HCC)- (present on admission)   Assessment & Plan    - Sinus tachycardia on EKG at admission  - On xeralto at home - held on admission for LINDSEY  - Anticoagulation switched from xarelto to heparin to eliquis   - Echo Nov 2017 : LV EF 60%, RVSP 16 mm Hg  Plan  - Eliquis (continue to prevent embolic CVA)          Acute-on-chronic kidney injury (CMS-HCC)- (present on admission)   Assessment & Plan    - acute phase resolved  - BUN/Cr 20/2.13 on admission. Baseline 1.2  (was 33/1.23 prior to discharge in Nov 2017)  - last BUN/Creat  1/1/18 - 38/1.69  - no labs since,  as patient was irritable with repeat sticks, now on Hospice  - xarelto switched to heparin --> eliquis  - patient much more cooperative since stopping daily labs, IVs  Plan:  - not for further intervention/investigations  - not for daily labs, IV  - Continue supportive pulm meds for COPD        Chronic pulmonary embolism (CMS-HCC)- (present on admission)   Assessment & Plan    - Chronically anticoagulated on xeralto for PEs - held due to LINDSEY that now resolved  - was anti-coagulated with wt based heparin - d/c'ed as patient doesn't want/like being attached to drip  - Wells score 4.5, moderate risk for PE - hypoxia improved, patient already on anticoagulation, hence no imaging done  - Renal function worse 1/1/18, and pt not keen on heparin drip, heparin d/c'ed on 1/1/18. Eliquis commenced 1/1/18  - not checking labs, patient now Hospice and feels much better & cooperative after stopping invasive tests   Plan  - continue Eliquis        Chronic low back pain   Assessment & Plan    - chronic   - uses gabapentin, MS Contin 15 mg TID at home  - pain refractory to tylenol, lidocaine and gabapentin  - Morphine (home med) started at half-dose 7.5 mg TID  - pain improved after commencing morphine  - ctm        Anemia   Assessment & Plan    - Hgb 10.4 on admission - at baseline  - Iron low 14, TIBC 288, % sat 5, ferritin normal 111.2  - likely due to anemia of chronic disease          Chronic cellulitis   Assessment & Plan    - B/L LE  - signed off by wound care  - no e/o active infecttion  - ctm

## 2018-01-10 NOTE — PROGRESS NOTES
Pt A&O x 4. PRN pain medication given once per MAR. Pt on 4 L NC with no shortness of breath. BLE dressings DCI. Pt uses call light appropriately. Pt OOB in hopper and up to chair during NOC shift. Discussed POC, safety, and mobility; pt has no questions at this time. Bed in lowest position with treaded socks on pt. Hourly rounding in place.

## 2018-01-11 VITALS
HEART RATE: 96 BPM | RESPIRATION RATE: 16 BRPM | TEMPERATURE: 97.9 F | BODY MASS INDEX: 34.91 KG/M2 | OXYGEN SATURATION: 90 % | WEIGHT: 235.67 LBS | DIASTOLIC BLOOD PRESSURE: 89 MMHG | HEIGHT: 69 IN | SYSTOLIC BLOOD PRESSURE: 131 MMHG

## 2018-01-11 LAB
MYCOBACTERIUM SPEC CULT: NORMAL
RHODAMINE-AURAMINE STN SPEC: NORMAL
SIGNIFICANT IND 70042: NORMAL
SITE SITE: NORMAL
SOURCE SOURCE: NORMAL

## 2018-01-11 PROCEDURE — A9270 NON-COVERED ITEM OR SERVICE: HCPCS | Performed by: INTERNAL MEDICINE

## 2018-01-11 PROCEDURE — A9270 NON-COVERED ITEM OR SERVICE: HCPCS | Performed by: STUDENT IN AN ORGANIZED HEALTH CARE EDUCATION/TRAINING PROGRAM

## 2018-01-11 PROCEDURE — 700102 HCHG RX REV CODE 250 W/ 637 OVERRIDE(OP): Performed by: STUDENT IN AN ORGANIZED HEALTH CARE EDUCATION/TRAINING PROGRAM

## 2018-01-11 PROCEDURE — 700102 HCHG RX REV CODE 250 W/ 637 OVERRIDE(OP): Performed by: INTERNAL MEDICINE

## 2018-01-11 RX ORDER — CARVEDILOL 12.5 MG/1
12.5 TABLET ORAL 2 TIMES DAILY WITH MEALS
Qty: 60 TAB | Refills: 1 | Status: SHIPPED | OUTPATIENT
Start: 2018-01-11

## 2018-01-11 RX ORDER — ACETAMINOPHEN 325 MG/1
650 TABLET ORAL EVERY 4 HOURS PRN
Qty: 30 TAB | Refills: 0 | Status: SHIPPED | OUTPATIENT
Start: 2018-01-11

## 2018-01-11 RX ORDER — LORAZEPAM 0.5 MG/1
0.5 TABLET ORAL
Qty: 10 TAB | Refills: 0 | Status: SHIPPED | OUTPATIENT
Start: 2018-01-11 | End: 2018-01-14

## 2018-01-11 RX ORDER — TAMSULOSIN HYDROCHLORIDE 0.4 MG/1
0.4 CAPSULE ORAL
Qty: 30 CAP | Refills: 1 | Status: SHIPPED | OUTPATIENT
Start: 2018-01-12

## 2018-01-11 RX ORDER — LIDOCAINE 50 MG/G
1 PATCH TOPICAL EVERY 24 HOURS
Qty: 10 PATCH | Refills: 1 | Status: SHIPPED | OUTPATIENT
Start: 2018-01-11

## 2018-01-11 RX ORDER — LORAZEPAM 0.5 MG/1
0.5 TABLET ORAL
Status: DISCONTINUED | OUTPATIENT
Start: 2018-01-11 | End: 2018-01-11 | Stop reason: HOSPADM

## 2018-01-11 RX ORDER — AMLODIPINE BESYLATE 10 MG/1
10 TABLET ORAL DAILY
Qty: 30 TAB | Refills: 1 | Status: SHIPPED | OUTPATIENT
Start: 2018-01-12

## 2018-01-11 RX ADMIN — BUDESONIDE AND FORMOTEROL FUMARATE DIHYDRATE 2 PUFF: 160; 4.5 AEROSOL RESPIRATORY (INHALATION) at 08:09

## 2018-01-11 RX ADMIN — APIXABAN 5 MG: 5 TABLET, FILM COATED ORAL at 08:08

## 2018-01-11 RX ADMIN — TAMSULOSIN HYDROCHLORIDE 0.4 MG: 0.4 CAPSULE ORAL at 08:08

## 2018-01-11 RX ADMIN — AMLODIPINE BESYLATE 10 MG: 10 TABLET ORAL at 08:08

## 2018-01-11 RX ADMIN — CARVEDILOL 12.5 MG: 6.25 TABLET, FILM COATED ORAL at 06:19

## 2018-01-11 RX ADMIN — MORPHINE SULFATE 7.5 MG: 15 TABLET ORAL at 04:54

## 2018-01-11 RX ADMIN — MORPHINE SULFATE 7.5 MG: 15 TABLET ORAL at 13:26

## 2018-01-11 ASSESSMENT — PAIN SCALES - GENERAL
PAINLEVEL_OUTOF10: 8
PAINLEVEL_OUTOF10: 7
PAINLEVEL_OUTOF10: 7
PAINLEVEL_OUTOF10: 6

## 2018-01-11 NOTE — CARE PLAN
Problem: Safety  Goal: Will remain free from injury  Outcome: PROGRESSING AS EXPECTED  Treaded socks in place, bed in the lowest position, call light and belongings within reach, pt call for assistance appropriately    Problem: Venous Thromboembolism (VTW)/Deep Vein Thrombosis (DVT) Prevention:  Goal: Patient will participate in Venous Thrombosis (VTE)/Deep Vein Thrombosis (DVT)Prevention Measures  Outcome: PROGRESSING AS EXPECTED  Pt receiving eliquis per MAR, ambulates frequently in the room    Problem: Skin Integrity  Goal: Risk for impaired skin integrity will decrease  Outcome: PROGRESSING AS EXPECTED  arturo risk assessment, pt turns self from side to side    Problem: Pain Management  Goal: Pain level will decrease to patient's comfort goal  Outcome: PROGRESSING AS EXPECTED  Medicated per MAR with adequate pain control, hourly rounding in progress

## 2018-01-11 NOTE — PROGRESS NOTES
Pt A&O x 4. PRN pain medication given per MAR. Pt on 4 L NC with no shortness of breath. BLE dressings DCI. Pt uses call light appropriately. Pt OOB in hopper and up to chair during NOC shift. Discussed POC, safety, and mobility; pt has no questions at this time. Bed in lowest position with treaded socks on pt. Hourly rounding in place.

## 2018-01-11 NOTE — DISCHARGE INSTRUCTIONS
Discharge Instructions    Discharged to home with [facility] by medical transportation with escort. Discharged via wheelchair, hospital escort: Yes.  Special equipment needed: Oxygen    Be sure to schedule a follow-up appointment with your primary care doctor or any specialists as instructed.     Discharge Plan:   Influenza Vaccine Indication: Not indicated: Previously immunized this influenza season and > 8 years of age    I understand that a diet low in cholesterol, fat, and sodium is recommended for good health. Unless I have been given specific instructions below for another diet, I accept this instruction as my diet prescription.   Other diet: Diet as tolerated    Special Instructions: None    · Is patient discharged on Warfarin / Coumadin?   No     · Is patient Post Blood Transfusion?  No      Chronic Obstructive Pulmonary Disease  Chronic obstructive pulmonary disease (COPD) is a common lung problem. In COPD, the flow of air from the lungs is limited. The way your lungs work will probably never return to normal, but there are things you can do to improve your lungs and make yourself feel better. Your doctor may treat your condition with:  · Medicines.  · Oxygen.  · Lung surgery.  · Changes to your diet.  · Rehabilitation. This may involve a team of specialists.  HOME CARE  · Take all medicines as told by your doctor.  · Avoid medicines or cough syrups that dry up your airway (such as antihistamines) and do not allow you to get rid of thick spit. You do not need to avoid them if told differently by your doctor.  · If you smoke, stop. Smoking makes the problem worse.  · Avoid being around things that make your breathing worse (like smoke, chemicals, and fumes).  · Use oxygen therapy and therapy to help improve your lungs (pulmonary rehabilitation) if told by your doctor. If you need home oxygen therapy, ask your doctor if you should buy a tool to measure your oxygen level (oximeter).  · Avoid people who  have a sickness you can catch (contagious).  · Avoid going outside when it is very hot, cold, or humid.  · Eat healthy foods. Eat smaller meals more often. Rest before meals.  · Stay active, but remember to also rest.  · Make sure to get all the shots (vaccines) your doctor recommends. Ask your doctor if you need a pneumonia shot.  · Learn and use tips on how to relax.  · Learn and use tips on how to control your breathing as told by your doctor. Try:  ¨ Breathing in (inhaling) through your nose for 1 second. Then, pucker your lips and breath out (exhale) through your lips for 2 seconds.  ¨ Putting one hand on your belly (abdomen). Breathe in slowly through your nose for 1 second. Your hand on your belly should move out. Pucker your lips and breathe out slowly through your lips. Your hand on your belly should move in as you breathe out.  · Learn and use controlled coughing to clear thick spit from your lungs. The steps are:  1. Lean your head a little forward.  2. Breathe in deeply.  3. Try to hold your breath for 3 seconds.  4. Keep your mouth slightly open while coughing 2 times.  5. Spit any thick spit out into a tissue.  6. Rest and do the steps again 1 or 2 times as needed.  GET HELP IF:  · You cough up more thick spit than usual.  · There is a change in the color or thickness of the spit.  · It is harder to breathe than usual.  · Your breathing is faster than usual.  GET HELP RIGHT AWAY IF:  · You have shortness of breath while resting.  · You have shortness of breath that stops you from:  ¨ Being able to talk.  ¨ Doing normal activities.  · You chest hurts for longer than 5 minutes.  · Your skin color is more blue than usual.  · Your pulse oximeter shows that you have low oxygen for longer than 5 minutes.  MAKE SURE YOU:  · Understand these instructions.  · Will watch your condition.  · Will get help right away if you are not doing well or get worse.     This information is not intended to replace advice  given to you by your health care provider. Make sure you discuss any questions you have with your health care provider.     Document Released: 06/05/2009 Document Revised: 01/08/2016 Document Reviewed: 08/14/2014  [employer] Interactive Patient Education ©2016 [employer]      Bronchitis  Bronchitis is a problem of the air tubes leading to your lungs. This problem makes it hard for air to get in and out of the lungs. You may cough a lot because your air tubes are narrow. Going without care can cause lasting (chronic) bronchitis.  HOME CARE   · Drink enough fluids to keep your pee (urine) clear or pale yellow.  · Use a cool mist humidifier.  · Quit smoking if you smoke. If you keep smoking, the bronchitis might not get better.  · Only take medicine as told by your doctor.  GET HELP RIGHT AWAY IF:   · Coughing keeps you awake.  · You start to wheeze.  · You become more sick or weak.  · You have a hard time breathing or get short of breath.  · You cough up blood.  · Coughing lasts more than 2 weeks.  · You have a fever.  · Your baby is older than 3 months with a rectal temperature of 102° F (38.9° C) or higher.  · Your baby is 3 months old or younger with a rectal temperature of 100.4° F (38° C) or higher.  MAKE SURE YOU:  · Understand these instructions.  · Will watch your condition.  · Will get help right away if you are not doing well or get worse.  Document Released: 06/05/2009 Document Revised: 03/11/2013 Document Reviewed: 11/19/2010  [employer]® Patient Information ©2014 [employer].        Depression / Suicide Risk    As you are discharged from this [facility] facility, it is important to learn how to keep safe from harming yourself.    Recognize the warning signs:  · Abrupt changes in personality, positive or negative- including increase in energy   · Giving away possessions  · Change in eating patterns- significant weight changes-  positive or negative  · Change in sleeping patterns- unable to sleep or  sleeping all the time   · Unwillingness or inability to communicate  · Depression  · Unusual sadness, discouragement and loneliness  · Talk of wanting to die  · Neglect of personal appearance   · Rebelliousness- reckless behavior  · Withdrawal from people/activities they love  · Confusion- inability to concentrate     If you or a loved one observes any of these behaviors or has concerns about self-harm, here's what you can do:  · Talk about it- your feelings and reasons for harming yourself  · Remove any means that you might use to hurt yourself (examples: pills, rope, extension cords, firearm)  · Get professional help from the community (Mental Health, Substance Abuse, psychological counseling)  · Do not be alone:Call your Safe Contact- someone whom you trust who will be there for you.  · Call your local CRISIS HOTLINE 333-5556 or 512-083-3605  · Call your local Children's Mobile Crisis Response Team [place] (972) 459-6930 or www.MirDeneg  · Call the toll free National Suicide Prevention Hotlines   · National Suicide Prevention Lifeline 672-531-SWOG (0077)  · National Hope Line Network 800-SUICIDE (916-9384)

## 2018-01-11 NOTE — PROGRESS NOTES
Report received. Assumed care. Pt in chair awake. A/O x4. VSS. Responds appropriately. Denies pain, SOB. On 4 LO2 with sats of 93-95%. Assessment complete. Dressings to bilateral lower extremities in place, cdi.  Discussed POC, mobility. Safety, DC planning, pt verbalizes understanding. Explained importance of calling before getting OOB. Call light and belongings within reach. Pt refuses bed alarm despite education. Bed in the lowest position. Treaded socks in place. Hourly rounding in progress. Will continue to monitor .

## 2018-01-11 NOTE — DISCHARGE PLANNING
Transport arranged with [facility]. Patient will be leaving today @3442 via the [facility] van going home. LORENE Helms notified.

## 2018-01-11 NOTE — CARE PLAN
Problem: Knowledge Deficit  Goal: Knowledge of disease process/condition, treatment plan, diagnostic tests, and medications will improve  Outcome: PROGRESSING AS EXPECTED  Pt assessed for knowledge deficit about POC, medications and current hospitalization. Pt is informed and acknowledges all education. No learning deficit apparent at this time.    Problem: Mobility  Goal: Risk for activity intolerance will decrease  Outcome: PROGRESSING AS EXPECTED  Pt is OOB self with FWW. Pt sitting up in chair and up in hopper during NOC shift; pt tolerated it well. Rest periods encouraged routinely and when needed. Ambulation and range of motion promoted. Call light used appropriately.

## 2018-01-12 NOTE — PROGRESS NOTES
D/C'd.  Discharge instructions provided to pt.  Pt states understanding.  Pt states all questions have been answered.  Copy of discharge provided to pt.  Signed copy in chart.  Prescriptions provided to pt. Pt states that all personal belongings are in possession( O2 )   Pt escorted off unit with assistance from medical transport via wheelchair at 1635 without incident.

## 2021-01-15 DIAGNOSIS — Z23 NEED FOR VACCINATION: ICD-10-CM
